# Patient Record
Sex: MALE | Race: WHITE | Employment: OTHER | URBAN - METROPOLITAN AREA
[De-identification: names, ages, dates, MRNs, and addresses within clinical notes are randomized per-mention and may not be internally consistent; named-entity substitution may affect disease eponyms.]

---

## 2017-01-06 NOTE — TELEPHONE ENCOUNTER
Please advise on refill request.   Last refilled on 5/10/16    Refill Protocol Appointment Criteria  · Appointment scheduled in the past 6 months or in the next 3 months  Recent Visits       Provider Department Primary Dx    6 months ago Yrn Hernandez

## 2017-01-09 RX ORDER — ACETAMINOPHEN AND CODEINE PHOSPHATE 300; 30 MG/1; MG/1
1 TABLET ORAL EVERY 4 HOURS PRN
Qty: 60 TABLET | Refills: 0 | OUTPATIENT
Start: 2017-01-09 | End: 2018-06-25

## 2017-01-09 RX ORDER — WARFARIN SODIUM 5 MG/1
5 TABLET ORAL DAILY
Qty: 135 TABLET | Refills: 0 | Status: SHIPPED | OUTPATIENT
Start: 2017-01-09 | End: 2017-01-20

## 2017-01-09 NOTE — TELEPHONE ENCOUNTER
Last issued on 8/10/16, please advise  Recent Visits       Provider Department Primary Dx    6 months ago Herbert Hill MD [facility] Acute bronchitis, unspecified organism    8 months ago MD Trae Walters

## 2017-01-09 NOTE — TELEPHONE ENCOUNTER
Approve refill for either 30 or 90 days depending on patient's insurance plan. Needs office visit before further refills.

## 2017-01-10 ENCOUNTER — TELEPHONE (OUTPATIENT)
Dept: INTERNAL MEDICINE CLINIC | Facility: CLINIC | Age: 62
End: 2017-01-10

## 2017-01-10 ENCOUNTER — ANTI-COAG VISIT (OUTPATIENT)
Dept: INTERNAL MEDICINE CLINIC | Facility: CLINIC | Age: 62
End: 2017-01-10

## 2017-01-10 DIAGNOSIS — I48.20 CHRONIC ATRIAL FIBRILLATION (HCC): Primary | ICD-10-CM

## 2017-01-10 LAB — INR: 2.5 (ref 2–3)

## 2017-01-10 PROCEDURE — 36416 COLLJ CAPILLARY BLOOD SPEC: CPT

## 2017-01-10 PROCEDURE — 85610 PROTHROMBIN TIME: CPT

## 2017-01-10 NOTE — TELEPHONE ENCOUNTER
Tylenol #3 w/ [place]--Please see 01/04 encounter. Pt stated he no longer uses [facility]. Pt would like rx phoned in to [facility].  Pt requesting rx asap.     Current outpatient prescriptions:   •  Acetaminophen-Codeine (TYLENOL WITH CODEINE #3) 300-

## 2017-01-20 ENCOUNTER — HOSPITAL ENCOUNTER (OUTPATIENT)
Dept: GENERAL RADIOLOGY | Facility: HOSPITAL | Age: 62
Discharge: HOME | End: 2017-01-20
Attending: INTERNAL MEDICINE
Payer: COMMERCIAL

## 2017-01-20 ENCOUNTER — OFFICE VISIT (OUTPATIENT)
Dept: INTERNAL MEDICINE CLINIC | Facility: CLINIC | Age: 62
End: 2017-01-20

## 2017-01-20 VITALS
SYSTOLIC BLOOD PRESSURE: 126 MMHG | BODY MASS INDEX: 37.3 KG/M2 | DIASTOLIC BLOOD PRESSURE: 87 MMHG | HEIGHT: 75 IN | WEIGHT: 300 LBS | TEMPERATURE: 98 F | HEART RATE: 86 BPM

## 2017-01-20 DIAGNOSIS — M25.562 LEFT MEDIAL KNEE PAIN: Primary | ICD-10-CM

## 2017-01-20 DIAGNOSIS — M25.562 LEFT MEDIAL KNEE PAIN: ICD-10-CM

## 2017-01-20 PROCEDURE — 99213 OFFICE O/P EST LOW 20 MIN: CPT | Performed by: INTERNAL MEDICINE

## 2017-01-20 PROCEDURE — 99212 OFFICE O/P EST SF 10 MIN: CPT | Performed by: INTERNAL MEDICINE

## 2017-01-20 PROCEDURE — 73560 X-RAY EXAM OF KNEE 1 OR 2: CPT

## 2017-01-20 NOTE — PROGRESS NOTES
C/o severe pain l medial knee x 1 week no t getting  Better  Tried tylenol 3 no rleidef  Has at fib, on warfarin  Blood pressure 126/87, pulse 86, temperature 97.6 °F (36.4 °C), temperature source Oral, height 6' 3\" (1.905 m), weight 300 lb (136.079 kg).

## 2017-01-23 ENCOUNTER — TELEPHONE (OUTPATIENT)
Dept: INTERNAL MEDICINE CLINIC | Facility: CLINIC | Age: 62
End: 2017-01-23

## 2017-01-23 NOTE — TELEPHONE ENCOUNTER
Pharmacy calling regarding Capsaicin 0.075 % External Cream.  Pharmacy state that manufacture no longer makes this but they do have Capsaicin 0.1 % External Cream, pharmacy would like to know if they can give this to pt. ..please advise

## 2017-01-24 RX ORDER — ASPIRIN 81 MG
TABLET,CHEWABLE ORAL
Qty: 56.6 G | Refills: 5 | Status: ON HOLD | OUTPATIENT
Start: 2017-01-24 | End: 2020-07-08

## 2017-02-08 ENCOUNTER — OFFICE VISIT (OUTPATIENT)
Dept: INTERNAL MEDICINE CLINIC | Facility: CLINIC | Age: 62
End: 2017-02-08

## 2017-02-08 VITALS
SYSTOLIC BLOOD PRESSURE: 136 MMHG | HEART RATE: 88 BPM | WEIGHT: 299 LBS | RESPIRATION RATE: 16 BRPM | BODY MASS INDEX: 36.79 KG/M2 | DIASTOLIC BLOOD PRESSURE: 88 MMHG | HEIGHT: 75.5 IN

## 2017-02-08 DIAGNOSIS — M25.562 ARTHRALGIA OF LEFT LOWER LEG: Primary | ICD-10-CM

## 2017-02-08 DIAGNOSIS — I10 ESSENTIAL HYPERTENSION: ICD-10-CM

## 2017-02-08 DIAGNOSIS — E66.01 MORBID OBESITY DUE TO EXCESS CALORIES (HCC): ICD-10-CM

## 2017-02-08 DIAGNOSIS — I48.20 CHRONIC ATRIAL FIBRILLATION (HCC): ICD-10-CM

## 2017-02-08 PROCEDURE — 99212 OFFICE O/P EST SF 10 MIN: CPT | Performed by: INTERNAL MEDICINE

## 2017-02-08 PROCEDURE — 99214 OFFICE O/P EST MOD 30 MIN: CPT | Performed by: INTERNAL MEDICINE

## 2017-02-08 RX ORDER — TRAMADOL HYDROCHLORIDE 50 MG/1
50 TABLET ORAL EVERY 6 HOURS PRN
Qty: 50 TABLET | Refills: 1 | Status: SHIPPED | OUTPATIENT
Start: 2017-02-08 | End: 2017-03-11

## 2017-02-08 NOTE — PROGRESS NOTES
Yudith Reddy is a 57year old male. HPI:   1. Arthralgia of left lower leg    Has been having increased pain in his left knee recently. Had x ray. Showed moderate arthritis on x ray.      2. Essential hypertension with goal <140/90    Patient has been fo STRENGTH) 81 MG Oral Tab EC Take 81 mg by mouth daily.  Disp:  Rfl:       Past Medical History   Diagnosis Date   • Atrial fibrillation [facility])    • Clot 2009     Right leg clot, coumadin   • Alcohol abuse 2003   • Musculoskeletal disorder      Per [employer]: M mg) up to 4 daily. (1 every 6 hours) for pain pain relief. Ultram 50 mg also ordered for use every 6 hours if pain severe. Glucosamine chondroitin 500/400 can be tried OTC (over the counter) twice daily as well.  [place] dream cream advised as well as cap

## 2017-02-14 ENCOUNTER — OFFICE VISIT (OUTPATIENT)
Dept: DERMATOLOGY CLINIC | Facility: CLINIC | Age: 62
End: 2017-02-14

## 2017-02-14 DIAGNOSIS — D23.60 BENIGN NEOPLASM OF SKIN OF UPPER LIMB, INCLUDING SHOULDER, UNSPECIFIED LATERALITY: ICD-10-CM

## 2017-02-14 DIAGNOSIS — L81.4 SOLAR LENTIGO: ICD-10-CM

## 2017-02-14 DIAGNOSIS — L57.8 SUN-DAMAGED SKIN: ICD-10-CM

## 2017-02-14 DIAGNOSIS — D23.5 BENIGN NEOPLASM OF SKIN OF TRUNK, EXCEPT SCROTUM: ICD-10-CM

## 2017-02-14 DIAGNOSIS — L30.9 DERMATITIS: ICD-10-CM

## 2017-02-14 DIAGNOSIS — D23.70 BENIGN NEOPLASM OF SKIN OF LOWER LIMB, INCLUDING HIP, UNSPECIFIED LATERALITY: ICD-10-CM

## 2017-02-14 DIAGNOSIS — L57.0 ACTINIC KERATOSIS: Primary | ICD-10-CM

## 2017-02-14 DIAGNOSIS — L82.1 SEBORRHEIC KERATOSES: ICD-10-CM

## 2017-02-14 DIAGNOSIS — D23.4 BENIGN NEOPLASM OF SCALP AND SKIN OF NECK: ICD-10-CM

## 2017-02-14 DIAGNOSIS — D23.30 BENIGN NEOPLASM OF SKIN OF FACE: ICD-10-CM

## 2017-02-14 PROCEDURE — 99202 OFFICE O/P NEW SF 15 MIN: CPT | Performed by: DERMATOLOGY

## 2017-02-14 PROCEDURE — 17000 DESTRUCT PREMALG LESION: CPT | Performed by: DERMATOLOGY

## 2017-02-14 PROCEDURE — 17003 DESTRUCT PREMALG LES 2-14: CPT | Performed by: DERMATOLOGY

## 2017-02-14 NOTE — PROGRESS NOTES
HPI:     Chief Complaint     Derm Problem; Rash        HPI     Derm Problem    Additional comments: LOV 6/19/2012. Patient presents for full body check. Patient with \"sun spots\" to jered arms. No personal or family hx of skin ca.            Rash    Addition (four) hours as needed for Pain.  Disp: 60 tablet Rfl: 0   Atorvastatin Calcium 10 MG Oral Tab TAKE 1 TABLET BY MOUTH EVERY NIGHT AT BEDTIME Disp: 90 tablet Rfl: 0   Warfarin Sodium (COUMADIN) 5 MG Oral Tab TAKE AS DIRECTED BY ANTICOAGULATION CLINIC: 1.5 ta Smoker  For 20.00 Years     Smokeless tobacco: Former User    Alcohol Use: No    Drug Use: No    Sexual Activity: Not on file   Not on file  Other Topics Concern    Caffeine Concern Yes    Comment: tea, coffee, 8 cups daily    Pt has a pacemaker No    Pt h keratoses-reassurance–no treatment  Solar lentigo  Benign neoplasm of skin of trunk, except scrotum-ABCDE's of melanoma discussed. the patient is to follow up yearly. The patient will come sooner should they note  anything new or changing.   Proper sunbloc

## 2017-03-01 NOTE — TELEPHONE ENCOUNTER
Pharmacy requests refill on Rx Warfin 5 MG. Pharmacy stts pt will be coming in 3/2 to pick.  Please advise           Current Outpatient Prescriptions:                                Warfarin Sodium (COUMADIN) 5 MG Oral Tab TAKE AS DIRECTED BY ANTICOAGULATIO

## 2017-03-04 RX ORDER — WARFARIN SODIUM 5 MG/1
TABLET ORAL
Qty: 135 TABLET | Refills: 1 | Status: SHIPPED | OUTPATIENT
Start: 2017-03-04 | End: 2017-05-13

## 2017-03-04 NOTE — TELEPHONE ENCOUNTER
Refill Protocol Appointment Criteria  · Appointment scheduled in the past 6 months or in the next 3 months  Recent Visits       Provider Department Primary Dx    3 weeks ago Shira Bonilla MD [facility], [facility], [address] Arthralgia of left lo

## 2017-03-11 NOTE — TELEPHONE ENCOUNTER
Please advise on refill request.     Recent Visits       Provider Department Primary Dx    1 month ago Ismael Young, Deepa Barrow MD [facility], [facility], [address] Arthralgia of left lower leg    1 month ago Darci Ocampo MD [facility]

## 2017-03-13 RX ORDER — TRAMADOL HYDROCHLORIDE 50 MG/1
TABLET ORAL
Qty: 50 TABLET | Refills: 1 | OUTPATIENT
Start: 2017-03-13 | End: 2017-03-23

## 2017-03-22 ENCOUNTER — MYAURORA ACCOUNT LINK (OUTPATIENT)
Dept: OTHER | Age: 62
End: 2017-03-22

## 2017-03-22 ENCOUNTER — PRIOR ORIGINAL RECORDS (OUTPATIENT)
Dept: OTHER | Age: 62
End: 2017-03-22

## 2017-03-27 RX ORDER — TRAMADOL HYDROCHLORIDE 50 MG/1
TABLET ORAL
Qty: 50 TABLET | Refills: 0 | Status: SHIPPED | OUTPATIENT
Start: 2017-03-27 | End: 2018-06-25 | Stop reason: ALTCHOICE

## 2017-03-28 NOTE — TELEPHONE ENCOUNTER
[place] was contacted and it was stated that the Rx for Tramadol was called in on the 25th with 1 refill.

## 2017-04-04 RX ORDER — CARVEDILOL 25 MG/1
TABLET ORAL
Qty: 44 TABLET | Refills: 0 | Status: SHIPPED | OUTPATIENT
Start: 2017-04-04 | End: 2017-05-13

## 2017-05-01 RX ORDER — ATORVASTATIN CALCIUM 10 MG/1
TABLET, FILM COATED ORAL
Qty: 30 TABLET | Refills: 0 | Status: SHIPPED | OUTPATIENT
Start: 2017-05-01 | End: 2017-05-13

## 2017-05-03 ENCOUNTER — ANTI-COAG VISIT (OUTPATIENT)
Dept: INTERNAL MEDICINE CLINIC | Facility: CLINIC | Age: 62
End: 2017-05-03

## 2017-05-03 DIAGNOSIS — I48.20 CHRONIC ATRIAL FIBRILLATION (HCC): Primary | ICD-10-CM

## 2017-05-03 PROCEDURE — 85610 PROTHROMBIN TIME: CPT

## 2017-05-03 PROCEDURE — 36416 COLLJ CAPILLARY BLOOD SPEC: CPT

## 2017-05-05 NOTE — TELEPHONE ENCOUNTER
Pt calling regarding refill request for the following medication. Pt needs 90 day prescription for all 3 sent to [facility]. Amelie Casiano please advise       Current Outpatient Prescriptions:  ATORVASTATIN 10 MG Oral Tab TAKE ONE TABLET BY MOUTH EVERY NIGHT AT BEDTIME D

## 2017-05-13 RX ORDER — ATORVASTATIN CALCIUM 10 MG/1
TABLET, FILM COATED ORAL
Qty: 90 TABLET | Refills: 0 | Status: SHIPPED | OUTPATIENT
Start: 2017-05-13 | End: 2017-07-01

## 2017-05-13 NOTE — TELEPHONE ENCOUNTER
Cholesterol Medications  Protocol Criteria:  · Appointment scheduled in the past 12 months or in the next 3 months  · ALT & LDL on file in the past 12 months  · ALT result < 80  · LDL result <130   Recent Visits       Provider Department Primary Dx    3 mo  01/28/2016   K 4.8 01/28/2016    01/28/2016   CO2 28 01/28/2016   GLOBULIN 3.8* 01/28/2016   AGRATIO 0.9* 01/28/2016   ANIONGAP 6 01/28/2016   OSMOCALC 294 01/28/2016     REFILL PROTOCOL FAILED. No CMP done in the past 12 months.  Please advi

## 2017-05-17 RX ORDER — WARFARIN SODIUM 5 MG/1
TABLET ORAL
Qty: 135 TABLET | Refills: 1 | Status: SHIPPED | OUTPATIENT
Start: 2017-05-17 | End: 2017-11-22

## 2017-05-17 RX ORDER — CARVEDILOL 25 MG/1
25 TABLET ORAL 2 TIMES DAILY WITH MEALS
Qty: 44 TABLET | Refills: 0 | Status: SHIPPED | OUTPATIENT
Start: 2017-05-17 | End: 2017-07-13

## 2017-05-19 RX ORDER — CARVEDILOL 25 MG/1
TABLET ORAL
Qty: 44 TABLET | OUTPATIENT
Start: 2017-05-19

## 2017-05-31 ENCOUNTER — ANTI-COAG VISIT (OUTPATIENT)
Dept: INTERNAL MEDICINE CLINIC | Facility: CLINIC | Age: 62
End: 2017-05-31

## 2017-05-31 DIAGNOSIS — I48.20 CHRONIC ATRIAL FIBRILLATION (HCC): Primary | ICD-10-CM

## 2017-05-31 PROCEDURE — 85610 PROTHROMBIN TIME: CPT

## 2017-05-31 PROCEDURE — 36416 COLLJ CAPILLARY BLOOD SPEC: CPT

## 2017-06-28 ENCOUNTER — ANTI-COAG VISIT (OUTPATIENT)
Dept: INTERNAL MEDICINE CLINIC | Facility: CLINIC | Age: 62
End: 2017-06-28

## 2017-06-28 DIAGNOSIS — I48.20 CHRONIC ATRIAL FIBRILLATION (HCC): ICD-10-CM

## 2017-06-28 PROCEDURE — 99211 OFF/OP EST MAY X REQ PHY/QHP: CPT

## 2017-06-28 PROCEDURE — 85610 PROTHROMBIN TIME: CPT

## 2017-06-28 PROCEDURE — 36416 COLLJ CAPILLARY BLOOD SPEC: CPT

## 2017-07-03 RX ORDER — ATORVASTATIN CALCIUM 10 MG/1
TABLET, FILM COATED ORAL
Qty: 90 TABLET | Refills: 1 | Status: SHIPPED | OUTPATIENT
Start: 2017-07-03 | End: 2018-01-10

## 2017-07-14 RX ORDER — CARVEDILOL 25 MG/1
TABLET ORAL
Qty: 44 TABLET | Refills: 1 | Status: SHIPPED | OUTPATIENT
Start: 2017-07-14 | End: 2017-07-31

## 2017-07-26 ENCOUNTER — ANTI-COAG VISIT (OUTPATIENT)
Dept: INTERNAL MEDICINE CLINIC | Facility: CLINIC | Age: 62
End: 2017-07-26

## 2017-07-26 DIAGNOSIS — I48.20 CHRONIC ATRIAL FIBRILLATION (HCC): ICD-10-CM

## 2017-07-26 LAB — INR: 3.7 (ref 2–3)

## 2017-07-26 PROCEDURE — 36416 COLLJ CAPILLARY BLOOD SPEC: CPT

## 2017-07-26 PROCEDURE — 99211 OFF/OP EST MAY X REQ PHY/QHP: CPT

## 2017-07-26 PROCEDURE — 85610 PROTHROMBIN TIME: CPT

## 2017-07-31 RX ORDER — CARVEDILOL 25 MG/1
TABLET ORAL
Qty: 88 TABLET | Refills: 1 | Status: SHIPPED | OUTPATIENT
Start: 2017-07-31 | End: 2017-10-09

## 2017-08-08 ENCOUNTER — ANTI-COAG VISIT (OUTPATIENT)
Dept: INTERNAL MEDICINE CLINIC | Facility: CLINIC | Age: 62
End: 2017-08-08

## 2017-08-08 DIAGNOSIS — I48.20 CHRONIC ATRIAL FIBRILLATION (HCC): ICD-10-CM

## 2017-08-08 LAB — INR: 3 (ref 2–3)

## 2017-08-08 PROCEDURE — 36416 COLLJ CAPILLARY BLOOD SPEC: CPT

## 2017-08-08 PROCEDURE — 85610 PROTHROMBIN TIME: CPT

## 2017-10-09 RX ORDER — CARVEDILOL 25 MG/1
TABLET ORAL
Qty: 176 TABLET | Refills: 1 | Status: SHIPPED | OUTPATIENT
Start: 2017-10-09 | End: 2018-06-25

## 2017-11-20 ENCOUNTER — ANTI-COAG VISIT (OUTPATIENT)
Dept: INTERNAL MEDICINE CLINIC | Facility: CLINIC | Age: 62
End: 2017-11-20

## 2017-11-20 ENCOUNTER — TELEPHONE (OUTPATIENT)
Dept: CARDIOLOGY CLINIC | Facility: CLINIC | Age: 62
End: 2017-11-20

## 2017-11-20 DIAGNOSIS — I48.20 CHRONIC ATRIAL FIBRILLATION (HCC): ICD-10-CM

## 2017-11-20 PROCEDURE — 36416 COLLJ CAPILLARY BLOOD SPEC: CPT

## 2017-11-20 PROCEDURE — 85610 PROTHROMBIN TIME: CPT

## 2017-11-22 RX ORDER — WARFARIN SODIUM 5 MG/1
TABLET ORAL
Qty: 135 TABLET | Refills: 3 | Status: SHIPPED | OUTPATIENT
Start: 2017-11-22 | End: 2019-03-25

## 2017-12-11 ENCOUNTER — TELEPHONE (OUTPATIENT)
Dept: CARDIOLOGY CLINIC | Facility: CLINIC | Age: 62
End: 2017-12-11

## 2017-12-11 DIAGNOSIS — I48.20 CHRONIC ATRIAL FIBRILLATION (HCC): Primary | ICD-10-CM

## 2017-12-11 NOTE — TELEPHONE ENCOUNTER
Hi Dr. Ramon Virgen,    Pt is due for annual anticoagulation clinic order, please review and sign the pended order.

## 2017-12-12 ENCOUNTER — ANTI-COAG VISIT (OUTPATIENT)
Dept: INTERNAL MEDICINE CLINIC | Facility: CLINIC | Age: 62
End: 2017-12-12

## 2017-12-12 DIAGNOSIS — I48.20 CHRONIC ATRIAL FIBRILLATION (HCC): ICD-10-CM

## 2017-12-27 ENCOUNTER — ANTI-COAG VISIT (OUTPATIENT)
Dept: INTERNAL MEDICINE CLINIC | Facility: CLINIC | Age: 62
End: 2017-12-27

## 2017-12-27 DIAGNOSIS — I48.20 CHRONIC ATRIAL FIBRILLATION (HCC): ICD-10-CM

## 2017-12-27 PROCEDURE — 36416 COLLJ CAPILLARY BLOOD SPEC: CPT

## 2017-12-27 PROCEDURE — 85610 PROTHROMBIN TIME: CPT

## 2018-01-09 NOTE — TELEPHONE ENCOUNTER
Coumadin is rat  poison and needs to be followed every month to be safely taken. Sorry.
Hi Dr. Mamadou Solis,    Pt is asking since his INR has been stable, can he come every 6 week, when his INR is within range? Please advise.
Left vm to pt, also documented in [facility].
Skin normal color for race, warm, dry and intact. No evidence of rash.

## 2018-01-10 RX ORDER — ATORVASTATIN CALCIUM 10 MG/1
TABLET, FILM COATED ORAL
Qty: 90 TABLET | Refills: 3 | Status: SHIPPED | OUTPATIENT
Start: 2018-01-10 | End: 2019-01-03

## 2018-01-24 ENCOUNTER — ANTI-COAG VISIT (OUTPATIENT)
Dept: INTERNAL MEDICINE CLINIC | Facility: CLINIC | Age: 63
End: 2018-01-24

## 2018-01-24 DIAGNOSIS — I48.20 CHRONIC ATRIAL FIBRILLATION (HCC): ICD-10-CM

## 2018-01-24 LAB — INR: 2.3 (ref 2–3)

## 2018-01-24 PROCEDURE — 36416 COLLJ CAPILLARY BLOOD SPEC: CPT

## 2018-01-24 PROCEDURE — 85610 PROTHROMBIN TIME: CPT

## 2018-02-01 ENCOUNTER — OFFICE VISIT (OUTPATIENT)
Dept: SURGERY | Facility: CLINIC | Age: 63
End: 2018-02-01

## 2018-02-01 VITALS
RESPIRATION RATE: 16 BRPM | HEIGHT: 74 IN | WEIGHT: 303.06 LBS | HEART RATE: 72 BPM | BODY MASS INDEX: 38.89 KG/M2 | DIASTOLIC BLOOD PRESSURE: 82 MMHG | SYSTOLIC BLOOD PRESSURE: 120 MMHG

## 2018-02-01 DIAGNOSIS — G47.33 OSA (OBSTRUCTIVE SLEEP APNEA): ICD-10-CM

## 2018-02-01 DIAGNOSIS — I42.0 DILATED CARDIOMYOPATHY (HCC): ICD-10-CM

## 2018-02-01 DIAGNOSIS — E78.2 MIXED HYPERLIPIDEMIA: ICD-10-CM

## 2018-02-01 DIAGNOSIS — I48.20 CHRONIC ATRIAL FIBRILLATION (HCC): ICD-10-CM

## 2018-02-01 DIAGNOSIS — E66.9 OBESITY (BMI 30-39.9): ICD-10-CM

## 2018-02-01 DIAGNOSIS — I10 ESSENTIAL HYPERTENSION: Primary | ICD-10-CM

## 2018-02-01 PROCEDURE — 99203 OFFICE O/P NEW LOW 30 MIN: CPT | Performed by: NURSE PRACTITIONER

## 2018-02-01 NOTE — PROGRESS NOTES
[facility] Consultation Note       Date of Consult:  2018    Patient:  Randi Ariza  :      3/22/1955  MRN:      [id number]    Referring Provider: Dr. Kris Gonzalez    Chief Complaint:  Patient presents with:  Consult: would like t 303 lb 0.8 oz   BMI 38.91 kg/m²      Patient Medications:      Current Outpatient Prescriptions:  ATORVASTATIN 10 MG Oral Tab TAKE 1 TABLET BY MOUTH  EVERY NIGHT AT BEDTIME Disp: 90 tablet Rfl: 3   WARFARIN SODIUM 5 MG Oral Tab TAKE AS DIRECTED BY  [facility] HERNIA SURGERY      Comment: 2005-hernia repair  No date: LIPOMA REMOVAL      Comment: Excision lipoma of back  No date: OTHER SURGICAL HISTORY      Comment: jered legs vein stripping  1993: OTHER SURGICAL HISTORY      Comment: Bilateral vein stripping  No d appetite change and fatigue. HENT: Negative. Respiratory: Negative. Negative for cough, shortness of breath and wheezing. Cardiovascular: Negative. Negative for chest pain and palpitations. Gastrointestinal: Negative.   Negative for abdominal di in hyper-somnolence. Encounter Diagnosis(ses):   Essential hypertension  (primary encounter diagnosis)  Mixed hyperlipidemia  MITCHEL (obstructive sleep apnea)  Chronic atrial fibrillation (HCC)  Dilated cardiomyopathy (HCC)  Obesity (BMI 30-39. 9)    PLAN Patient not to skip meals- be sure to eat breakfast- suggested Premier Protein Shake (one a day) for a meal replacement, if needed     Increase water intake to 48-64oz water/day      Eat 3-4 cups fruits/vegetables daily- substitute salty/sweet snacks with

## 2018-02-22 ENCOUNTER — TELEPHONE (OUTPATIENT)
Dept: INTERNAL MEDICINE CLINIC | Facility: CLINIC | Age: 63
End: 2018-02-22

## 2018-02-22 ENCOUNTER — ANTI-COAG VISIT (OUTPATIENT)
Dept: INTERNAL MEDICINE CLINIC | Facility: CLINIC | Age: 63
End: 2018-02-22

## 2018-02-22 DIAGNOSIS — I48.20 CHRONIC ATRIAL FIBRILLATION (HCC): ICD-10-CM

## 2018-02-22 LAB
INR: 2.6 (ref 2–3)
INR: 2.6 (ref 2–3)

## 2018-02-22 PROCEDURE — 36416 COLLJ CAPILLARY BLOOD SPEC: CPT

## 2018-02-22 PROCEDURE — 85610 PROTHROMBIN TIME: CPT

## 2018-03-05 ENCOUNTER — OFFICE VISIT (OUTPATIENT)
Dept: SURGERY | Facility: CLINIC | Age: 63
End: 2018-03-05

## 2018-03-05 VITALS
BODY MASS INDEX: 39.01 KG/M2 | SYSTOLIC BLOOD PRESSURE: 127 MMHG | OXYGEN SATURATION: 94 % | RESPIRATION RATE: 16 BRPM | HEART RATE: 71 BPM | DIASTOLIC BLOOD PRESSURE: 90 MMHG | WEIGHT: 304 LBS | HEIGHT: 74 IN

## 2018-03-05 DIAGNOSIS — E66.9 OBESITY (BMI 30-39.9): ICD-10-CM

## 2018-03-05 DIAGNOSIS — E78.2 MIXED HYPERLIPIDEMIA: ICD-10-CM

## 2018-03-05 DIAGNOSIS — I10 ESSENTIAL HYPERTENSION: Primary | ICD-10-CM

## 2018-03-05 DIAGNOSIS — I48.20 CHRONIC ATRIAL FIBRILLATION (HCC): ICD-10-CM

## 2018-03-05 DIAGNOSIS — R13.10 DYSPHAGIA, UNSPECIFIED TYPE: ICD-10-CM

## 2018-03-05 DIAGNOSIS — R60.0 LEG EDEMA: ICD-10-CM

## 2018-03-05 DIAGNOSIS — I42.0 DILATED CARDIOMYOPATHY (HCC): ICD-10-CM

## 2018-03-05 DIAGNOSIS — G47.33 OSA (OBSTRUCTIVE SLEEP APNEA): ICD-10-CM

## 2018-03-05 PROCEDURE — 99213 OFFICE O/P EST LOW 20 MIN: CPT | Performed by: NURSE PRACTITIONER

## 2018-03-05 NOTE — PROGRESS NOTES
[address], [address]  Dept: [phone]     Date:   3/5/2018    Patient:  Shaggy Yan  :      3/22/1955  MRN:      [id number]    Chief Complaint:  Pa MOUTH IN EVENING Disp: 135 tablet Rfl: 3   CARVEDILOL 25 MG Oral Tab TAKE 1 TABLET BY MOUTH TWO  TIMES DAILY WITH MEALS Disp: 176 tablet Rfl: 1   TRAMADOL HCL 50 MG Oral Tab TAKE ONE TABLET BY MOUTH EVERY 6 HOURS AS NEEDED FOR PAIN Disp: 50 tablet Rfl: 0 Problem Relation Age of Onset   • Other [Other] [OTHER] Father      brain aneurysm   • Eye Problems Other      retinal blastoma, niece       Food Journal  · Reviewed and Discussed:       · Patient has a Food Journal?: yes   · Patient is reading nutrition normal. No respiratory distress. Abdominal: Soft. He exhibits no distension. There is no tenderness. Musculoskeletal: Normal range of motion. He exhibits no edema. Neurological: He is alert and oriented to person, place, and time.    Skin: Skin is war of water per day. No fruit juices or regular soda. 3. Increase aerobic exercises (goal is 150 minutes per week)  4. Increase fruit and vegetable servings/day  5. Keep carbs at or below 100g/day  6. Avoid skipping meals  7.  Prepare meals and snacks in adva

## 2018-03-16 ENCOUNTER — OFFICE VISIT (OUTPATIENT)
Dept: INTERNAL MEDICINE CLINIC | Facility: CLINIC | Age: 63
End: 2018-03-16

## 2018-03-16 VITALS
SYSTOLIC BLOOD PRESSURE: 150 MMHG | DIASTOLIC BLOOD PRESSURE: 91 MMHG | RESPIRATION RATE: 16 BRPM | WEIGHT: 305 LBS | BODY MASS INDEX: 39.14 KG/M2 | HEIGHT: 74 IN | HEART RATE: 88 BPM

## 2018-03-16 DIAGNOSIS — E66.01 MORBID OBESITY DUE TO EXCESS CALORIES (HCC): ICD-10-CM

## 2018-03-16 DIAGNOSIS — M17.11 ARTHRITIS OF RIGHT KNEE: Primary | ICD-10-CM

## 2018-03-16 DIAGNOSIS — I10 ESSENTIAL HYPERTENSION WITH GOAL BLOOD PRESSURE LESS THAN 140/90: ICD-10-CM

## 2018-03-16 DIAGNOSIS — I48.20 CHRONIC ATRIAL FIBRILLATION (HCC): ICD-10-CM

## 2018-03-16 PROCEDURE — 99214 OFFICE O/P EST MOD 30 MIN: CPT | Performed by: INTERNAL MEDICINE

## 2018-03-16 PROCEDURE — 99212 OFFICE O/P EST SF 10 MIN: CPT | Performed by: INTERNAL MEDICINE

## 2018-03-16 NOTE — PROGRESS NOTES
Juan A Hoang is a 60year old male. HPI:   1. Arthralgia of right knee    Has been having increased pain in his left knee recently. Had x ray. Showed moderate arthritis on x ray. Hurts to walk regularly.  Trying to lose weight with bariatric program. 6 HOURS AS NEEDED FOR PAIN Disp: 50 tablet Rfl: 0   Acetaminophen-Codeine (TYLENOL WITH CODEINE #3) 300-30 MG Oral Tab Take 1 tablet by mouth every 4 (four) hours as needed for Pain.  Disp: 60 tablet Rfl: 0      Past Medical History:   Diagnosis Date   • Al lower leg. Knees swollen and enlarged. ASSESSMENT AND PLAN:   1. Arthritis of right knee    Ice joint area intermittently as tolerated for short periods of time to decrease any inflammation and give some pain relief.  Take tylenol XS (500 mg) up to 4 varun

## 2018-03-22 ENCOUNTER — ANTI-COAG VISIT (OUTPATIENT)
Dept: INTERNAL MEDICINE CLINIC | Facility: CLINIC | Age: 63
End: 2018-03-22

## 2018-03-22 DIAGNOSIS — I48.20 CHRONIC ATRIAL FIBRILLATION (HCC): ICD-10-CM

## 2018-03-22 LAB — INR: 2.1 (ref 2–3)

## 2018-03-22 PROCEDURE — 36416 COLLJ CAPILLARY BLOOD SPEC: CPT

## 2018-03-22 PROCEDURE — 85610 PROTHROMBIN TIME: CPT

## 2018-04-09 ENCOUNTER — OFFICE VISIT (OUTPATIENT)
Dept: SURGERY | Facility: CLINIC | Age: 63
End: 2018-04-09

## 2018-04-09 VITALS
SYSTOLIC BLOOD PRESSURE: 130 MMHG | HEIGHT: 74 IN | OXYGEN SATURATION: 97 % | RESPIRATION RATE: 18 BRPM | WEIGHT: 305 LBS | DIASTOLIC BLOOD PRESSURE: 88 MMHG | BODY MASS INDEX: 39.14 KG/M2 | HEART RATE: 84 BPM

## 2018-04-09 DIAGNOSIS — G47.33 OSA (OBSTRUCTIVE SLEEP APNEA): ICD-10-CM

## 2018-04-09 DIAGNOSIS — I42.0 DILATED CARDIOMYOPATHY (HCC): ICD-10-CM

## 2018-04-09 DIAGNOSIS — I10 ESSENTIAL HYPERTENSION WITH GOAL BLOOD PRESSURE LESS THAN 140/90: Primary | ICD-10-CM

## 2018-04-09 DIAGNOSIS — E78.2 MIXED HYPERLIPIDEMIA: ICD-10-CM

## 2018-04-09 DIAGNOSIS — I48.20 CHRONIC ATRIAL FIBRILLATION (HCC): ICD-10-CM

## 2018-04-09 DIAGNOSIS — E66.9 OBESITY (BMI 30-39.9): ICD-10-CM

## 2018-04-09 PROCEDURE — 99213 OFFICE O/P EST LOW 20 MIN: CPT | Performed by: NURSE PRACTITIONER

## 2018-04-09 NOTE — PROGRESS NOTES
[address] [place] [address]  [address]  [facility] [address] [facility] [address]  Dept: [phone]     Date:   18    Patient:  Steve Recio  :      3/22/1955  MRN:      [id number]    Chief Complaint:  Alla Rodríguez EVENING Disp: 135 tablet Rfl: 3   CARVEDILOL 25 MG Oral Tab TAKE 1 TABLET BY MOUTH TWO  TIMES DAILY WITH MEALS Disp: 176 tablet Rfl: 1   TRAMADOL HCL 50 MG Oral Tab TAKE ONE TABLET BY MOUTH EVERY 6 HOURS AS NEEDED FOR PAIN Disp: 50 tablet Rfl: 0   Sybil retinal blastoma, niece       Food Journal  · Reviewed and Discussed:       · Patient has a Food Journal?: no   · Patient is reading nutrition labels?   no  · Average Caloric Intake:   unknown  · Average CHO Intake: unknown  · Is patient exercising? no  · T tenderness. Musculoskeletal: Normal range of motion. He exhibits no edema. Neurological: He is alert and oriented to person, place, and time. Skin: Skin is warm and dry. Psychiatric: He has a normal mood and affect.  His behavior is normal. Judgment per week)  4. Increase fruit and vegetable servings/day  5. Keep carbs at or below 100g/day  6. Avoid skipping meals  7. Prepare meals and snacks in advance  8.  Limit ETOH intake- has been cutting down- drinks vodka when he drinks mixed with juice    Logan Pisano

## 2018-04-18 ENCOUNTER — ANTI-COAG VISIT (OUTPATIENT)
Dept: INTERNAL MEDICINE CLINIC | Facility: CLINIC | Age: 63
End: 2018-04-18

## 2018-04-18 DIAGNOSIS — I48.20 CHRONIC ATRIAL FIBRILLATION (HCC): ICD-10-CM

## 2018-04-18 PROCEDURE — 36416 COLLJ CAPILLARY BLOOD SPEC: CPT

## 2018-04-18 PROCEDURE — 85610 PROTHROMBIN TIME: CPT

## 2018-05-17 ENCOUNTER — OFFICE VISIT (OUTPATIENT)
Dept: INTERNAL MEDICINE CLINIC | Facility: CLINIC | Age: 63
End: 2018-05-17

## 2018-05-17 VITALS
BODY MASS INDEX: 39.27 KG/M2 | SYSTOLIC BLOOD PRESSURE: 139 MMHG | DIASTOLIC BLOOD PRESSURE: 100 MMHG | HEIGHT: 74 IN | WEIGHT: 306 LBS | HEART RATE: 97 BPM | RESPIRATION RATE: 16 BRPM

## 2018-05-17 DIAGNOSIS — I10 ESSENTIAL HYPERTENSION WITH GOAL BLOOD PRESSURE LESS THAN 140/90: Primary | ICD-10-CM

## 2018-05-17 DIAGNOSIS — G47.33 OSA (OBSTRUCTIVE SLEEP APNEA): ICD-10-CM

## 2018-05-17 DIAGNOSIS — E66.01 MORBID OBESITY DUE TO EXCESS CALORIES (HCC): ICD-10-CM

## 2018-05-17 DIAGNOSIS — I48.20 CHRONIC ATRIAL FIBRILLATION (HCC): ICD-10-CM

## 2018-05-17 PROCEDURE — 99212 OFFICE O/P EST SF 10 MIN: CPT | Performed by: INTERNAL MEDICINE

## 2018-05-17 PROCEDURE — 99214 OFFICE O/P EST MOD 30 MIN: CPT | Performed by: INTERNAL MEDICINE

## 2018-05-17 RX ORDER — LISINOPRIL AND HYDROCHLOROTHIAZIDE 25; 20 MG/1; MG/1
1 TABLET ORAL DAILY
Qty: 90 TABLET | Refills: 3 | Status: SHIPPED | OUTPATIENT
Start: 2018-05-17 | End: 2018-06-25

## 2018-05-17 NOTE — PROGRESS NOTES
Kendall Gamez is a 58year old male. HPI:     1. Essential hypertension with goal <140/90    Patient has been following low salt diet and has been taking anti-hypertensive prescriptions as prescribed.  Blood pressure has been checked and is under good con TAKE ONE TABLET BY MOUTH EVERY 6 HOURS AS NEEDED FOR PAIN Disp: 50 tablet Rfl: 0   Capsaicin 0.1 % External Cream Apply 1 Application topically 3 (three) times daily.  Disp: 56.6 g Rfl: 5   Acetaminophen-Codeine (TYLENOL WITH CODEINE #3) 300-30 MG Oral Tab to auscultation  CARDIO: RRR without murmur  GI: good BS's,no masses, HSM or tenderness  EXTREMITIES: no cyanosis, clubbing, Has healing scab on left ankle and lower leg. Knees swollen and enlarged. ASSESSMENT AND PLAN:     1.  Essential hypertension wit

## 2018-05-18 ENCOUNTER — ANTI-COAG VISIT (OUTPATIENT)
Dept: INTERNAL MEDICINE CLINIC | Facility: CLINIC | Age: 63
End: 2018-05-18

## 2018-05-18 DIAGNOSIS — I48.20 CHRONIC ATRIAL FIBRILLATION (HCC): ICD-10-CM

## 2018-05-18 PROCEDURE — 85610 PROTHROMBIN TIME: CPT

## 2018-05-18 PROCEDURE — 36416 COLLJ CAPILLARY BLOOD SPEC: CPT

## 2018-06-01 ENCOUNTER — OFFICE VISIT (OUTPATIENT)
Dept: SURGERY | Facility: CLINIC | Age: 63
End: 2018-06-01

## 2018-06-01 VITALS
WEIGHT: 298.06 LBS | BODY MASS INDEX: 38.25 KG/M2 | RESPIRATION RATE: 18 BRPM | HEIGHT: 74 IN | SYSTOLIC BLOOD PRESSURE: 160 MMHG | DIASTOLIC BLOOD PRESSURE: 90 MMHG

## 2018-06-01 DIAGNOSIS — I42.0 DILATED CARDIOMYOPATHY (HCC): ICD-10-CM

## 2018-06-01 DIAGNOSIS — I48.20 CHRONIC ATRIAL FIBRILLATION (HCC): ICD-10-CM

## 2018-06-01 DIAGNOSIS — E78.2 MIXED HYPERLIPIDEMIA: ICD-10-CM

## 2018-06-01 DIAGNOSIS — G47.33 OSA (OBSTRUCTIVE SLEEP APNEA): ICD-10-CM

## 2018-06-01 DIAGNOSIS — I10 ESSENTIAL HYPERTENSION WITH GOAL BLOOD PRESSURE LESS THAN 140/90: Primary | ICD-10-CM

## 2018-06-01 PROCEDURE — 99213 OFFICE O/P EST LOW 20 MIN: CPT | Performed by: NURSE PRACTITIONER

## 2018-06-01 NOTE — PROGRESS NOTES
[address], [facility]  [address] [facility] [address] [facility] [address]  Dept: [phone]     Date:   18    Patient:  Juan A Hoang  :      3/22/1955  MRN:      [id number]    Chief Complaint:  Doy [place] Disp: 135 tablet Rfl: 3   CARVEDILOL 25 MG Oral Tab TAKE 1 TABLET BY MOUTH TWO  TIMES DAILY WITH MEALS Disp: 176 tablet Rfl: 1   TRAMADOL HCL 50 MG Oral Tab TAKE ONE TABLET BY MOUTH EVERY 6 HOURS AS NEEDED FOR PAIN Disp: 50 tablet Rfl: 0   Capsaicin 0.1 % blastoma, niece       Food Journal  · Reviewed and Discussed:       · Patient has a Food Journal?: no   · Patient is reading nutrition labels?   no  · Average Caloric Intake:   unknown  · Average CHO Intake: unknown  · Is patient exercising? no  · Type of e distension. There is no tenderness. Musculoskeletal: Normal range of motion. He exhibits no edema. Neurological: He is alert and oriented to person, place, and time. Skin: Skin is warm and dry. Psychiatric: He has a normal mood and affect.  His beha minutes per week)  4. Increase fruit and vegetable servings/day  5. Keep carbs at or below 100g/day  6. Avoid skipping meals  7. Prepare meals and snacks in advance  8.  Limit ETOH intake- has been cutting down- drinks vodka when he drinks mixed with juice

## 2018-06-14 ENCOUNTER — ANTI-COAG VISIT (OUTPATIENT)
Dept: INTERNAL MEDICINE CLINIC | Facility: CLINIC | Age: 63
End: 2018-06-14

## 2018-06-14 DIAGNOSIS — I48.20 CHRONIC ATRIAL FIBRILLATION (HCC): ICD-10-CM

## 2018-06-14 PROCEDURE — 36416 COLLJ CAPILLARY BLOOD SPEC: CPT

## 2018-06-14 PROCEDURE — 85610 PROTHROMBIN TIME: CPT

## 2018-06-14 PROCEDURE — 99211 OFF/OP EST MAY X REQ PHY/QHP: CPT

## 2018-06-25 ENCOUNTER — OFFICE VISIT (OUTPATIENT)
Dept: INTERNAL MEDICINE CLINIC | Facility: CLINIC | Age: 63
End: 2018-06-25

## 2018-06-25 VITALS
HEART RATE: 109 BPM | BODY MASS INDEX: 38.24 KG/M2 | TEMPERATURE: 98 F | HEIGHT: 74 IN | WEIGHT: 298 LBS | SYSTOLIC BLOOD PRESSURE: 137 MMHG | DIASTOLIC BLOOD PRESSURE: 89 MMHG

## 2018-06-25 DIAGNOSIS — E66.01 MORBID OBESITY DUE TO EXCESS CALORIES (HCC): ICD-10-CM

## 2018-06-25 DIAGNOSIS — I48.20 CHRONIC ATRIAL FIBRILLATION (HCC): Primary | ICD-10-CM

## 2018-06-25 DIAGNOSIS — I10 ESSENTIAL HYPERTENSION WITH GOAL BLOOD PRESSURE LESS THAN 140/90: ICD-10-CM

## 2018-06-25 DIAGNOSIS — G47.33 OSA (OBSTRUCTIVE SLEEP APNEA): ICD-10-CM

## 2018-06-25 PROCEDURE — 99212 OFFICE O/P EST SF 10 MIN: CPT | Performed by: INTERNAL MEDICINE

## 2018-06-25 PROCEDURE — 99214 OFFICE O/P EST MOD 30 MIN: CPT | Performed by: INTERNAL MEDICINE

## 2018-06-25 RX ORDER — CARVEDILOL 25 MG/1
TABLET ORAL
Qty: 172 TABLET | Refills: 3 | Status: SHIPPED | OUTPATIENT
Start: 2018-06-25 | End: 2019-10-19

## 2018-06-25 RX ORDER — ACETAMINOPHEN AND CODEINE PHOSPHATE 300; 30 MG/1; MG/1
1 TABLET ORAL EVERY 4 HOURS PRN
Qty: 60 TABLET | Refills: 0 | Status: SHIPPED | OUTPATIENT
Start: 2018-06-25 | End: 2018-10-01

## 2018-06-25 NOTE — PROGRESS NOTES
Elma Lyle is a 58year old male. HPI:     1. Essential hypertension with goal <140/90    Patient has been following low salt diet and has been taking anti-hypertensive prescriptions as prescribed.  Blood pressure has been checked and is under good con aspirin (ECOTRIN LOW STRENGTH) 81 MG Oral Tab EC Take 81 mg by mouth daily. Disp:  Rfl:    Capsaicin 0.1 % External Cream Apply 1 Application topically 3 (three) times daily.  Disp: 56.6 g Rfl: 5   Acetaminophen-Codeine (TYLENOL WITH CODEINE #3) 300-30 MG murmur  GI: good BS's,no masses, HSM or tenderness  EXTREMITIES: no cyanosis, clubbing, Has healing scab on left ankle and lower leg. Knees swollen and enlarged. ASSESSMENT AND PLAN:     1.  Essential hypertension with goal blood pressure less than 140/9

## 2018-06-28 ENCOUNTER — ANTI-COAG VISIT (OUTPATIENT)
Dept: INTERNAL MEDICINE CLINIC | Facility: CLINIC | Age: 63
End: 2018-06-28

## 2018-06-28 DIAGNOSIS — I48.20 CHRONIC ATRIAL FIBRILLATION (HCC): ICD-10-CM

## 2018-06-28 PROCEDURE — 36416 COLLJ CAPILLARY BLOOD SPEC: CPT

## 2018-06-28 PROCEDURE — 85610 PROTHROMBIN TIME: CPT

## 2018-07-13 ENCOUNTER — ANTI-COAG VISIT (OUTPATIENT)
Dept: INTERNAL MEDICINE CLINIC | Facility: CLINIC | Age: 63
End: 2018-07-13

## 2018-07-13 DIAGNOSIS — I48.20 CHRONIC ATRIAL FIBRILLATION (HCC): ICD-10-CM

## 2018-07-13 LAB — INR: 1.9 (ref 2–3)

## 2018-07-13 PROCEDURE — 99211 OFF/OP EST MAY X REQ PHY/QHP: CPT | Performed by: INTERNAL MEDICINE

## 2018-07-30 ENCOUNTER — OFFICE VISIT (OUTPATIENT)
Dept: INTERNAL MEDICINE CLINIC | Facility: CLINIC | Age: 63
End: 2018-07-30
Payer: COMMERCIAL

## 2018-07-30 VITALS
RESPIRATION RATE: 16 BRPM | WEIGHT: 303 LBS | SYSTOLIC BLOOD PRESSURE: 130 MMHG | HEIGHT: 75 IN | DIASTOLIC BLOOD PRESSURE: 101 MMHG | HEART RATE: 94 BPM | BODY MASS INDEX: 37.67 KG/M2

## 2018-07-30 DIAGNOSIS — I48.20 CHRONIC ATRIAL FIBRILLATION (HCC): ICD-10-CM

## 2018-07-30 DIAGNOSIS — G47.33 OSA (OBSTRUCTIVE SLEEP APNEA): ICD-10-CM

## 2018-07-30 DIAGNOSIS — E66.01 MORBID OBESITY DUE TO EXCESS CALORIES (HCC): ICD-10-CM

## 2018-07-30 DIAGNOSIS — I10 ESSENTIAL HYPERTENSION WITH GOAL BLOOD PRESSURE LESS THAN 140/90: Primary | ICD-10-CM

## 2018-07-30 PROCEDURE — 99214 OFFICE O/P EST MOD 30 MIN: CPT | Performed by: INTERNAL MEDICINE

## 2018-07-30 PROCEDURE — 99212 OFFICE O/P EST SF 10 MIN: CPT | Performed by: INTERNAL MEDICINE

## 2018-07-30 RX ORDER — AMLODIPINE BESYLATE 5 MG/1
5 TABLET ORAL DAILY
Qty: 90 TABLET | Refills: 3 | Status: SHIPPED | OUTPATIENT
Start: 2018-07-30 | End: 2018-10-01 | Stop reason: ALTCHOICE

## 2018-07-30 RX ORDER — LISINOPRIL 20 MG/1
20 TABLET ORAL DAILY
Qty: 90 TABLET | Refills: 3 | Status: SHIPPED | OUTPATIENT
Start: 2018-07-30 | End: 2018-07-30

## 2018-07-30 NOTE — PROGRESS NOTES
Teo Cantrell is a 58year old male. HPI:     1. Essential hypertension with goal <140/90    Patient has been following low salt diet and has been taking anti-hypertensive prescriptions as prescribed.  Blood pressure has been checked and is under good con Rfl: 3   WARFARIN SODIUM 5 MG Oral Tab TAKE AS DIRECTED BY  ANTICOAGULATION CLINIC: 1.5 TABLETS AS DIRECTED BY  MOUTH IN EVENING Disp: 135 tablet Rfl: 3   Capsaicin 0.1 % External Cream Apply 1 Application topically 3 (three) times daily.  Disp: 56.6 g Rfl: BS's,no masses, HSM or tenderness  EXTREMITIES: no cyanosis, clubbing, Has healing scab on left ankle and lower leg. Knees swollen and enlarged. ASSESSMENT AND PLAN:     1.  Essential hypertension with goal blood pressure less than 140/90    Patient inst

## 2018-08-17 ENCOUNTER — ANTI-COAG VISIT (OUTPATIENT)
Dept: INTERNAL MEDICINE CLINIC | Facility: CLINIC | Age: 63
End: 2018-08-17
Payer: COMMERCIAL

## 2018-08-17 DIAGNOSIS — I48.20 CHRONIC ATRIAL FIBRILLATION (HCC): ICD-10-CM

## 2018-08-17 LAB — INR: 1.5 (ref 2–3)

## 2018-08-17 PROCEDURE — 36416 COLLJ CAPILLARY BLOOD SPEC: CPT

## 2018-08-17 PROCEDURE — 85610 PROTHROMBIN TIME: CPT

## 2018-08-20 ENCOUNTER — OFFICE VISIT (OUTPATIENT)
Dept: SURGERY | Facility: CLINIC | Age: 63
End: 2018-08-20
Payer: COMMERCIAL

## 2018-08-20 VITALS
SYSTOLIC BLOOD PRESSURE: 122 MMHG | DIASTOLIC BLOOD PRESSURE: 80 MMHG | OXYGEN SATURATION: 96 % | RESPIRATION RATE: 18 BRPM | BODY MASS INDEX: 39.47 KG/M2 | WEIGHT: 307.56 LBS | HEIGHT: 74 IN | HEART RATE: 79 BPM

## 2018-08-20 DIAGNOSIS — I10 ESSENTIAL HYPERTENSION WITH GOAL BLOOD PRESSURE LESS THAN 140/90: ICD-10-CM

## 2018-08-20 DIAGNOSIS — I48.20 CHRONIC ATRIAL FIBRILLATION (HCC): ICD-10-CM

## 2018-08-20 DIAGNOSIS — Z51.81 ENCOUNTER FOR THERAPEUTIC DRUG MONITORING: Primary | ICD-10-CM

## 2018-08-20 DIAGNOSIS — E78.2 MIXED HYPERLIPIDEMIA: ICD-10-CM

## 2018-08-20 DIAGNOSIS — R63.5 WEIGHT GAIN: ICD-10-CM

## 2018-08-20 DIAGNOSIS — I42.0 DILATED CARDIOMYOPATHY (HCC): ICD-10-CM

## 2018-08-20 DIAGNOSIS — R63.2 INCREASED APPETITE: ICD-10-CM

## 2018-08-20 DIAGNOSIS — E66.9 OBESITY (BMI 30-39.9): ICD-10-CM

## 2018-08-20 DIAGNOSIS — G47.33 OSA (OBSTRUCTIVE SLEEP APNEA): ICD-10-CM

## 2018-08-20 PROCEDURE — 99214 OFFICE O/P EST MOD 30 MIN: CPT | Performed by: NURSE PRACTITIONER

## 2018-08-20 NOTE — PROGRESS NOTES
[address], [address]  Dept: [phone]     Date:   18    Patient:  Nae Rea  :      3/22/1955  MRN:      [id number]    Chief Complaint:  Azell Alberta Disp: 172 tablet Rfl: 3   Acetaminophen-Codeine (TYLENOL WITH CODEINE #3) 300-30 MG Oral Tab Take 1 tablet by mouth every 4 (four) hours as needed for Pain.  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   no  · Average Caloric Intake:   unknown  · Average CHO Intake Effort normal. No respiratory distress. Abdominal: Soft. He exhibits no distension. There is no tenderness. Musculoskeletal: Normal range of motion. He exhibits no edema. Neurological: He is alert and oriented to person, place, and time.    Skin: Skin Fibrillation: on coumadin     Cardiomyopathy: saw Dr. Rashad Gill recently; had Echo done in [facility]    Increased appetite/weight gain: Admits to eating out often and drinking ETOH a few times per week related to out of town visitors and vacations;  Discussed healt

## 2018-08-22 ENCOUNTER — TELEPHONE (OUTPATIENT)
Dept: SURGERY | Facility: CLINIC | Age: 63
End: 2018-08-22

## 2018-08-22 NOTE — TELEPHONE ENCOUNTER
Please call Abdon Guillen and let him know Belviq was denied. He can use the coupon provided to him during his visit to submit to help with the cost of the med.   Thanks

## 2018-08-22 NOTE — TELEPHONE ENCOUNTER
[facility] sent a denial notice for patients Bleviq. I will fax it to the [place] location but just wanted you to you know.   Thank you  Eran Multani

## 2018-08-31 ENCOUNTER — ANTI-COAG VISIT (OUTPATIENT)
Dept: INTERNAL MEDICINE CLINIC | Facility: CLINIC | Age: 63
End: 2018-08-31
Payer: COMMERCIAL

## 2018-08-31 DIAGNOSIS — I48.20 CHRONIC ATRIAL FIBRILLATION (HCC): ICD-10-CM

## 2018-08-31 LAB — INR: 1.7 (ref 2–3)

## 2018-08-31 PROCEDURE — 85610 PROTHROMBIN TIME: CPT

## 2018-08-31 PROCEDURE — 36416 COLLJ CAPILLARY BLOOD SPEC: CPT

## 2018-08-31 PROCEDURE — 99211 OFF/OP EST MAY X REQ PHY/QHP: CPT

## 2018-09-20 ENCOUNTER — TELEPHONE (OUTPATIENT)
Dept: INTERNAL MEDICINE CLINIC | Facility: CLINIC | Age: 63
End: 2018-09-20

## 2018-09-20 DIAGNOSIS — I48.20 CHRONIC ATRIAL FIBRILLATION (HCC): Primary | ICD-10-CM

## 2018-09-21 ENCOUNTER — TELEPHONE (OUTPATIENT)
Dept: INTERNAL MEDICINE CLINIC | Facility: CLINIC | Age: 63
End: 2018-09-21

## 2018-09-21 NOTE — TELEPHONE ENCOUNTER
Current Outpatient Medications: AmLODIPine Besylate 5 MG Oral Tab Take 1 tablet (5 mg total) by mouth daily.  Disp: 90 tablet Rfl: 3

## 2018-09-22 RX ORDER — AMLODIPINE BESYLATE 5 MG/1
5 TABLET ORAL DAILY
Qty: 90 TABLET | Refills: 3 | OUTPATIENT
Start: 2018-09-22 | End: 2019-09-22

## 2018-09-28 ENCOUNTER — ANTI-COAG VISIT (OUTPATIENT)
Dept: INTERNAL MEDICINE CLINIC | Facility: CLINIC | Age: 63
End: 2018-09-28
Payer: COMMERCIAL

## 2018-09-28 DIAGNOSIS — Z79.01 LONG TERM (CURRENT) USE OF ANTICOAGULANTS: ICD-10-CM

## 2018-09-28 DIAGNOSIS — Z51.81 ENCOUNTER FOR THERAPEUTIC DRUG MONITORING: Primary | ICD-10-CM

## 2018-09-28 DIAGNOSIS — I48.20 CHRONIC ATRIAL FIBRILLATION (HCC): ICD-10-CM

## 2018-09-28 LAB — INR: 1.7 (ref 2–3)

## 2018-09-28 PROCEDURE — 85610 PROTHROMBIN TIME: CPT

## 2018-09-28 PROCEDURE — 36416 COLLJ CAPILLARY BLOOD SPEC: CPT

## 2018-10-01 ENCOUNTER — OFFICE VISIT (OUTPATIENT)
Dept: INTERNAL MEDICINE CLINIC | Facility: CLINIC | Age: 63
End: 2018-10-01
Payer: COMMERCIAL

## 2018-10-01 ENCOUNTER — OFFICE VISIT (OUTPATIENT)
Dept: SURGERY | Facility: CLINIC | Age: 63
End: 2018-10-01
Payer: COMMERCIAL

## 2018-10-01 VITALS
SYSTOLIC BLOOD PRESSURE: 139 MMHG | DIASTOLIC BLOOD PRESSURE: 98 MMHG | HEIGHT: 75 IN | RESPIRATION RATE: 16 BRPM | BODY MASS INDEX: 38.79 KG/M2 | HEART RATE: 70 BPM | WEIGHT: 312 LBS

## 2018-10-01 VITALS
HEART RATE: 104 BPM | DIASTOLIC BLOOD PRESSURE: 87 MMHG | OXYGEN SATURATION: 96 % | SYSTOLIC BLOOD PRESSURE: 129 MMHG | WEIGHT: 311 LBS | BODY MASS INDEX: 39.91 KG/M2 | RESPIRATION RATE: 16 BRPM | HEIGHT: 74 IN

## 2018-10-01 DIAGNOSIS — Z12.5 SCREENING FOR PROSTATE CANCER: ICD-10-CM

## 2018-10-01 DIAGNOSIS — E78.2 MIXED HYPERLIPIDEMIA: ICD-10-CM

## 2018-10-01 DIAGNOSIS — G47.33 OSA (OBSTRUCTIVE SLEEP APNEA): ICD-10-CM

## 2018-10-01 DIAGNOSIS — R63.5 WEIGHT GAIN: ICD-10-CM

## 2018-10-01 DIAGNOSIS — I10 ESSENTIAL HYPERTENSION WITH GOAL BLOOD PRESSURE LESS THAN 140/90: ICD-10-CM

## 2018-10-01 DIAGNOSIS — R63.2 INCREASED APPETITE: ICD-10-CM

## 2018-10-01 DIAGNOSIS — E66.01 MORBID OBESITY DUE TO EXCESS CALORIES (HCC): ICD-10-CM

## 2018-10-01 DIAGNOSIS — I48.20 CHRONIC ATRIAL FIBRILLATION (HCC): ICD-10-CM

## 2018-10-01 DIAGNOSIS — I10 ESSENTIAL HYPERTENSION WITH GOAL BLOOD PRESSURE LESS THAN 140/90: Primary | ICD-10-CM

## 2018-10-01 DIAGNOSIS — Z51.81 ENCOUNTER FOR THERAPEUTIC DRUG MONITORING: Primary | ICD-10-CM

## 2018-10-01 PROCEDURE — 99214 OFFICE O/P EST MOD 30 MIN: CPT | Performed by: NURSE PRACTITIONER

## 2018-10-01 PROCEDURE — 99214 OFFICE O/P EST MOD 30 MIN: CPT | Performed by: INTERNAL MEDICINE

## 2018-10-01 PROCEDURE — 99212 OFFICE O/P EST SF 10 MIN: CPT | Performed by: INTERNAL MEDICINE

## 2018-10-01 RX ORDER — ACETAMINOPHEN AND CODEINE PHOSPHATE 300; 30 MG/1; MG/1
1 TABLET ORAL EVERY 4 HOURS PRN
Qty: 42 TABLET | Refills: 0 | Status: SHIPPED | OUTPATIENT
Start: 2018-10-01 | End: 2019-01-22

## 2018-10-01 RX ORDER — TOPIRAMATE 25 MG/1
25 TABLET ORAL DAILY
Qty: 30 TABLET | Refills: 2 | Status: SHIPPED | OUTPATIENT
Start: 2018-10-01 | End: 2018-11-13

## 2018-10-01 RX ORDER — LISINOPRIL AND HYDROCHLOROTHIAZIDE 25; 20 MG/1; MG/1
1 TABLET ORAL DAILY
COMMUNITY
End: 2018-11-20

## 2018-10-01 NOTE — PATIENT INSTRUCTIONS
Goals for next month:  1. Keep a food log using [facility]  2. Drink 48-64 ounces of water per day. No fruit juices or regular soda. 3. Increase aerobic exercises (goal is 150 minutes per week)  4. Increase fruit and vegetable servings/day  5.  Keep carbs at or b

## 2018-10-01 NOTE — PROGRESS NOTES
Chante Kasper is a 58year old male. HPI:     1. Essential hypertension with goal <140/90    Patient has been following low salt diet and has been taking anti-hypertensive prescriptions as prescribed.  Blood pressure has been checked and is under good con WITH MEALS Disp: 172 tablet Rfl: 3   Acetaminophen-Codeine (TYLENOL WITH CODEINE #3) 300-30 MG Oral Tab Take 1 tablet by mouth every 4 (four) hours as needed for Pain.  Disp: 60 tablet Rfl: 0   ATORVASTATIN 10 MG Oral Tab TAKE 1 TABLET BY MOUTH  EVERY NIGHT kg)   BMI 39.00 kg/m²     GENERAL: well developed, well nourished,in no apparent distress  SKIN: no rashes,no suspicious lesions  HEENT: atraumatic, normocephalic,ears and throat are clear  NECK: supple,no adenopathy,no bruits  LUNGS: clear to auscultation considered. 5. Screening for prostate cancer    Check a PSA level. - PSA SCREEN; Future    The patient indicates understanding of these issues and agrees to the plan.     The patient is asked to return in 1 month

## 2018-10-01 NOTE — PROGRESS NOTES
[address], [address]  Dept: [phone]     Date:   10/1/18    Patient:  Nae Rea  :      3/22/1955  MRN:      [id number]    Chief Complaint:  Azell Alberta mouth every 4 (four) hours as needed for Pain.  Disp: 60 tablet Rfl: 0   ATORVASTATIN 10 MG Oral Tab TAKE 1 TABLET BY MOUTH  EVERY NIGHT AT BEDTIME Disp: 90 tablet Rfl: 3   WARFARIN SODIUM 5 MG Oral Tab TAKE AS DIRECTED BY  ANTICOAGULATION CLINIC: 1.5 TABLE History of tanning: Not Asked        Outdoor occupation: Not Asked        Pt has a pacemaker: No        Pt has a defibrillator: No        Reaction to local anesthetic: No    Social History Narrative      Not on file    Surgical History:  Past Surgical Hist Read nutrition labels, Drink 64 oz of water per day, Maintain a daily food journal, No drinking 30 minutes before or after meals, Utlize portion control strategies to reduce calorie intake, Identify triggers for eating and manage cues and Eat slowly and ta Encounter for therapeutic drug monitoring  (primary encounter diagnosis)  Weight gain  Chronic atrial fibrillation (hcc)  Essential hypertension with goal blood pressure less than 140/90  Mixed hyperlipidemia  Jesus (obstructive sleep apnea)  Increased zaheer pizza, bread, pasta, potatoes, rice    Per Dr. Sabrina Lira- Needs to get BP down to under 140/90 regularly before any BP raising pills can be considered. Since he has a hx of a-fib and cardiomyopathy, stimulants will not be used due to contraindication.

## 2018-10-19 ENCOUNTER — ANTI-COAG VISIT (OUTPATIENT)
Dept: INTERNAL MEDICINE CLINIC | Facility: CLINIC | Age: 63
End: 2018-10-19
Payer: COMMERCIAL

## 2018-10-19 DIAGNOSIS — I48.20 CHRONIC ATRIAL FIBRILLATION (HCC): ICD-10-CM

## 2018-10-19 PROCEDURE — 36416 COLLJ CAPILLARY BLOOD SPEC: CPT

## 2018-10-19 PROCEDURE — 85610 PROTHROMBIN TIME: CPT

## 2018-10-31 ENCOUNTER — LAB ENCOUNTER (OUTPATIENT)
Dept: LAB | Facility: HOSPITAL | Age: 63
End: 2018-10-31
Attending: INTERNAL MEDICINE
Payer: COMMERCIAL

## 2018-10-31 DIAGNOSIS — Z12.5 SCREENING FOR PROSTATE CANCER: ICD-10-CM

## 2018-10-31 DIAGNOSIS — I10 ESSENTIAL HYPERTENSION WITH GOAL BLOOD PRESSURE LESS THAN 140/90: ICD-10-CM

## 2018-10-31 PROCEDURE — 85025 COMPLETE CBC W/AUTO DIFF WBC: CPT

## 2018-10-31 PROCEDURE — 80061 LIPID PANEL: CPT

## 2018-10-31 PROCEDURE — 36415 COLL VENOUS BLD VENIPUNCTURE: CPT

## 2018-10-31 PROCEDURE — 84443 ASSAY THYROID STIM HORMONE: CPT

## 2018-10-31 PROCEDURE — 80053 COMPREHEN METABOLIC PANEL: CPT

## 2018-11-13 ENCOUNTER — OFFICE VISIT (OUTPATIENT)
Dept: SURGERY | Facility: CLINIC | Age: 63
End: 2018-11-13
Payer: COMMERCIAL

## 2018-11-13 VITALS
DIASTOLIC BLOOD PRESSURE: 99 MMHG | SYSTOLIC BLOOD PRESSURE: 131 MMHG | WEIGHT: 307 LBS | BODY MASS INDEX: 38.17 KG/M2 | HEART RATE: 111 BPM | HEIGHT: 75 IN | OXYGEN SATURATION: 18 % | RESPIRATION RATE: 18 BRPM

## 2018-11-13 DIAGNOSIS — R63.2 INCREASED APPETITE: ICD-10-CM

## 2018-11-13 DIAGNOSIS — R63.5 WEIGHT GAIN: ICD-10-CM

## 2018-11-13 DIAGNOSIS — Z51.81 ENCOUNTER FOR THERAPEUTIC DRUG MONITORING: ICD-10-CM

## 2018-11-13 DIAGNOSIS — E78.2 MIXED HYPERLIPIDEMIA: Primary | ICD-10-CM

## 2018-11-13 DIAGNOSIS — G47.33 OSA (OBSTRUCTIVE SLEEP APNEA): ICD-10-CM

## 2018-11-13 DIAGNOSIS — E66.9 OBESITY (BMI 30-39.9): ICD-10-CM

## 2018-11-13 PROCEDURE — 99214 OFFICE O/P EST MOD 30 MIN: CPT | Performed by: INTERNAL MEDICINE

## 2018-11-13 RX ORDER — TOPIRAMATE 25 MG/1
25 TABLET ORAL 2 TIMES DAILY
Qty: 180 TABLET | Refills: 2 | Status: SHIPPED | OUTPATIENT
Start: 2018-11-13 | End: 2019-01-02

## 2018-11-13 NOTE — PROGRESS NOTES
[address], [address]  Dept: [phone]     Date:   10/1/18    Patient:  Kya Piper  :      3/22/1955  MRN:      [id number]    Chief Complaint:  Ruthie Lambert TWO  TIMES DAILY WITH MEALS Disp: 172 tablet Rfl: 3   ATORVASTATIN 10 MG Oral Tab TAKE 1 TABLET BY MOUTH  EVERY NIGHT AT BEDTIME Disp: 90 tablet Rfl: 3   WARFARIN SODIUM 5 MG Oral Tab TAKE AS DIRECTED BY  ANTICOAGULATION CLINIC: 1.5 TABLETS AS DIRECTED BY Asked        Outdoor occupation: Not Asked        Pt has a pacemaker: No        Pt has a defibrillator: No        Reaction to local anesthetic: No    Social History Narrative      Not on file    Surgical History:    Past Surgical History:   Procedure Later breakfast, Eat 3 meals per day, Plan meals in advance, Read nutrition labels, Drink 64 oz of water per day, Maintain a daily food journal, No drinking 30 minutes before or after meals, Utlize portion control strategies to reduce calorie intake, Identify tr in hyper-somnolence. Encounter Diagnosis(ses):   Mixed hyperlipidemia  (primary encounter diagnosis)  Jesus (obstructive sleep apnea)  Obesity (bmi 30-39. 9)    PLAN   Patient is not interested in bariatric surgery.  Patient desires to pursue traditional w

## 2018-11-14 ENCOUNTER — ANTI-COAG VISIT (OUTPATIENT)
Dept: INTERNAL MEDICINE CLINIC | Facility: CLINIC | Age: 63
End: 2018-11-14
Payer: COMMERCIAL

## 2018-11-14 DIAGNOSIS — Z51.81 ENCOUNTER FOR THERAPEUTIC DRUG MONITORING: Primary | ICD-10-CM

## 2018-11-14 DIAGNOSIS — Z79.01 LONG TERM (CURRENT) USE OF ANTICOAGULANTS: ICD-10-CM

## 2018-11-14 DIAGNOSIS — I48.20 CHRONIC ATRIAL FIBRILLATION (HCC): ICD-10-CM

## 2018-11-14 PROCEDURE — 36416 COLLJ CAPILLARY BLOOD SPEC: CPT

## 2018-11-14 PROCEDURE — 85610 PROTHROMBIN TIME: CPT

## 2018-11-17 NOTE — TELEPHONE ENCOUNTER
Pharmacy   [facility] [address]ALIA [address] [phone], [phone]   Medication Detail    Disp Refills Start End    AmLODIPine Besylate 5 MG Oral Tab (Discontinued) 90 tablet 3 7/30/2018 10/1/2018    Sig - Route:  Take 1 tablet (5

## 2018-11-20 ENCOUNTER — TELEPHONE (OUTPATIENT)
Dept: INTERNAL MEDICINE CLINIC | Facility: CLINIC | Age: 63
End: 2018-11-20

## 2018-11-20 RX ORDER — AMLODIPINE BESYLATE 5 MG/1
5 TABLET ORAL DAILY
Qty: 90 TABLET | Refills: 0 | Status: SHIPPED | OUTPATIENT
Start: 2018-11-20 | End: 2019-01-08

## 2018-11-20 NOTE — TELEPHONE ENCOUNTER
Current Outpatient Medications: AmLODIPine Besylate 5 MG Oral Tab Take 1 tablet (5 mg total) by mouth daily for 90 doses.  Disp: 90 tablet Rfl: 0

## 2018-11-20 NOTE — TELEPHONE ENCOUNTER
Patient called back. Confirmed he is still taking Amlodipine. See 10/1/18 OV notes. Patient to f/u. GFR wnl, creatinine wnl. Patient would like his meds sent to [facility].  Not local.     Hypertensive Medications  Protocol Criteria:  · Appointment sc 2.9 10/31/2018    AGRATIO 0.9 (L) 01/28/2016    ANIONGAP 7 10/31/2018    OSMOCALC 298 (H) 10/31/2018

## 2018-12-12 ENCOUNTER — ANTI-COAG VISIT (OUTPATIENT)
Dept: INTERNAL MEDICINE CLINIC | Facility: CLINIC | Age: 63
End: 2018-12-12
Payer: COMMERCIAL

## 2018-12-12 DIAGNOSIS — I48.20 CHRONIC ATRIAL FIBRILLATION (HCC): ICD-10-CM

## 2018-12-12 DIAGNOSIS — Z79.01 LONG TERM (CURRENT) USE OF ANTICOAGULANTS: ICD-10-CM

## 2018-12-12 DIAGNOSIS — Z51.81 ENCOUNTER FOR THERAPEUTIC DRUG MONITORING: ICD-10-CM

## 2018-12-12 PROCEDURE — 36416 COLLJ CAPILLARY BLOOD SPEC: CPT

## 2018-12-12 PROCEDURE — 85610 PROTHROMBIN TIME: CPT

## 2019-01-02 ENCOUNTER — APPOINTMENT (OUTPATIENT)
Dept: LAB | Facility: HOSPITAL | Age: 64
End: 2019-01-02
Attending: INTERNAL MEDICINE
Payer: COMMERCIAL

## 2019-01-02 ENCOUNTER — OFFICE VISIT (OUTPATIENT)
Dept: SURGERY | Facility: CLINIC | Age: 64
End: 2019-01-02
Payer: COMMERCIAL

## 2019-01-02 VITALS
SYSTOLIC BLOOD PRESSURE: 128 MMHG | HEIGHT: 75 IN | OXYGEN SATURATION: 96 % | WEIGHT: 311.06 LBS | BODY MASS INDEX: 38.67 KG/M2 | RESPIRATION RATE: 18 BRPM | DIASTOLIC BLOOD PRESSURE: 88 MMHG | HEART RATE: 67 BPM

## 2019-01-02 DIAGNOSIS — I10 ESSENTIAL HYPERTENSION WITH GOAL BLOOD PRESSURE LESS THAN 140/90: ICD-10-CM

## 2019-01-02 DIAGNOSIS — R73.09 ABNORMAL BLOOD SUGAR: ICD-10-CM

## 2019-01-02 DIAGNOSIS — R63.2 INCREASED APPETITE: ICD-10-CM

## 2019-01-02 DIAGNOSIS — E66.9 OBESITY (BMI 30-39.9): ICD-10-CM

## 2019-01-02 DIAGNOSIS — E53.8 LOW VITAMIN B12 LEVEL: ICD-10-CM

## 2019-01-02 DIAGNOSIS — Z51.81 ENCOUNTER FOR THERAPEUTIC DRUG MONITORING: ICD-10-CM

## 2019-01-02 DIAGNOSIS — G47.33 OSA (OBSTRUCTIVE SLEEP APNEA): ICD-10-CM

## 2019-01-02 DIAGNOSIS — R63.5 WEIGHT GAIN: ICD-10-CM

## 2019-01-02 DIAGNOSIS — I10 ESSENTIAL HYPERTENSION WITH GOAL BLOOD PRESSURE LESS THAN 140/90: Primary | ICD-10-CM

## 2019-01-02 DIAGNOSIS — E55.9 VITAMIN D DEFICIENCY: ICD-10-CM

## 2019-01-02 DIAGNOSIS — E78.2 MIXED HYPERLIPIDEMIA: ICD-10-CM

## 2019-01-02 LAB
EST. AVERAGE GLUCOSE BLD GHB EST-MCNC: 120 MG/DL (ref 68–126)
HBA1C MFR BLD HPLC: 5.8 % (ref ?–5.7)
VIT B12 SERPL-MCNC: 154 PG/ML (ref 181–914)

## 2019-01-02 PROCEDURE — 82607 VITAMIN B-12: CPT

## 2019-01-02 PROCEDURE — 82397 CHEMILUMINESCENT ASSAY: CPT

## 2019-01-02 PROCEDURE — 83036 HEMOGLOBIN GLYCOSYLATED A1C: CPT

## 2019-01-02 PROCEDURE — 99214 OFFICE O/P EST MOD 30 MIN: CPT | Performed by: INTERNAL MEDICINE

## 2019-01-02 PROCEDURE — 36415 COLL VENOUS BLD VENIPUNCTURE: CPT

## 2019-01-02 PROCEDURE — 82306 VITAMIN D 25 HYDROXY: CPT

## 2019-01-02 RX ORDER — TOPIRAMATE 25 MG/1
25 TABLET ORAL 2 TIMES DAILY
Qty: 180 TABLET | Refills: 2 | Status: SHIPPED | OUTPATIENT
Start: 2019-01-02 | End: 2019-02-01

## 2019-01-02 NOTE — PROGRESS NOTES
[address] [place] [address],[address]  Dept: [phone]     Date:   10/1/18    Patient:  Melony Hightower  :      3/22/1955  MRN:      [id number]    Chief Complaint:  Julius Bird Tab TAKE 1 TABLET BY MOUTH TWO  TIMES DAILY WITH MEALS Disp: 172 tablet Rfl: 3   ATORVASTATIN 10 MG Oral Tab TAKE 1 TABLET BY MOUTH  EVERY NIGHT AT BEDTIME Disp: 90 tablet Rfl: 3   WARFARIN SODIUM 5 MG Oral Tab TAKE AS DIRECTED BY  ANTICOAGULATION CLINIC: History of tanning: Not Asked        Outdoor occupation: Not Asked        Pt has a pacemaker: No        Pt has a defibrillator: No        Reaction to local anesthetic: No    Social History Narrative      Not on file    Surgical History:    Past Surgical Reviewed and Discussed  Eat breakfast, Eat 3 meals per day, Plan meals in advance, Read nutrition labels, Drink 64 oz of water per day, Maintain a daily food journal, No drinking 30 minutes before or after meals, Utlize portion control strategies to reduce There has not been any increase in hyper-somnolence.      Encounter Diagnosis(ses):   Essential hypertension with goal blood pressure less than 140/90  (primary encounter diagnosis)  Mixed hyperlipidemia  Jesus (obstructive sleep apnea)  Increased appetite  O alcohol    Losing inches    Sam Plummer MD

## 2019-01-03 ENCOUNTER — TELEPHONE (OUTPATIENT)
Dept: SURGERY | Facility: CLINIC | Age: 64
End: 2019-01-03

## 2019-01-03 RX ORDER — ATORVASTATIN CALCIUM 10 MG/1
TABLET, FILM COATED ORAL
Qty: 90 TABLET | Refills: 3 | Status: SHIPPED | OUTPATIENT
Start: 2019-01-03 | End: 2019-12-07

## 2019-01-03 NOTE — TELEPHONE ENCOUNTER
Patient aware Dr. Sylvie Carpenter reviewed labs and his vitamin B-12 is low. Dr. Sylvie Carpenter would like for Taty Maher to start B-12 injections every two weeks for 6 weeks. Patient has an appt  with his primary doctor on the 8th of this month.  He will ask if they can adminis

## 2019-01-04 LAB — 25(OH)D3 SERPL-MCNC: 21.4 NG/ML (ref 30–100)

## 2019-01-05 LAB — LEPTIN: 27.8 NG/ML

## 2019-01-08 ENCOUNTER — ANTI-COAG VISIT (OUTPATIENT)
Dept: INTERNAL MEDICINE CLINIC | Facility: CLINIC | Age: 64
End: 2019-01-08
Payer: COMMERCIAL

## 2019-01-08 ENCOUNTER — OFFICE VISIT (OUTPATIENT)
Dept: INTERNAL MEDICINE CLINIC | Facility: CLINIC | Age: 64
End: 2019-01-08
Payer: COMMERCIAL

## 2019-01-08 VITALS
RESPIRATION RATE: 16 BRPM | DIASTOLIC BLOOD PRESSURE: 89 MMHG | SYSTOLIC BLOOD PRESSURE: 138 MMHG | BODY MASS INDEX: 38.17 KG/M2 | WEIGHT: 307 LBS | HEART RATE: 100 BPM | HEIGHT: 75 IN

## 2019-01-08 DIAGNOSIS — I48.20 CHRONIC ATRIAL FIBRILLATION (HCC): ICD-10-CM

## 2019-01-08 DIAGNOSIS — E53.8 VITAMIN B12 DEFICIENCY: ICD-10-CM

## 2019-01-08 DIAGNOSIS — Z79.01 LONG TERM (CURRENT) USE OF ANTICOAGULANTS: ICD-10-CM

## 2019-01-08 DIAGNOSIS — E66.01 MORBID OBESITY DUE TO EXCESS CALORIES (HCC): ICD-10-CM

## 2019-01-08 DIAGNOSIS — Z51.81 ENCOUNTER FOR THERAPEUTIC DRUG MONITORING: ICD-10-CM

## 2019-01-08 DIAGNOSIS — E55.9 VITAMIN D DEFICIENCY: ICD-10-CM

## 2019-01-08 DIAGNOSIS — I10 ESSENTIAL HYPERTENSION WITH GOAL BLOOD PRESSURE LESS THAN 140/90: Primary | ICD-10-CM

## 2019-01-08 DIAGNOSIS — M25.571 ACUTE RIGHT ANKLE PAIN: ICD-10-CM

## 2019-01-08 LAB — INR: 2.2 (ref 2–3)

## 2019-01-08 PROCEDURE — 96372 THER/PROPH/DIAG INJ SC/IM: CPT | Performed by: INTERNAL MEDICINE

## 2019-01-08 PROCEDURE — 36416 COLLJ CAPILLARY BLOOD SPEC: CPT

## 2019-01-08 PROCEDURE — 99214 OFFICE O/P EST MOD 30 MIN: CPT | Performed by: INTERNAL MEDICINE

## 2019-01-08 PROCEDURE — 85610 PROTHROMBIN TIME: CPT

## 2019-01-08 PROCEDURE — 99212 OFFICE O/P EST SF 10 MIN: CPT | Performed by: INTERNAL MEDICINE

## 2019-01-08 RX ORDER — AMLODIPINE BESYLATE 5 MG/1
TABLET ORAL
Qty: 90 TABLET | Refills: 0 | Status: SHIPPED | OUTPATIENT
Start: 2019-01-08 | End: 2019-03-25

## 2019-01-08 RX ORDER — ERGOCALCIFEROL 1.25 MG/1
50000 CAPSULE ORAL WEEKLY
Qty: 12 CAPSULE | Refills: 0 | Status: SHIPPED | OUTPATIENT
Start: 2019-01-08 | End: 2019-02-07

## 2019-01-08 RX ORDER — CYANOCOBALAMIN 1000 UG/ML
1000 INJECTION INTRAMUSCULAR; SUBCUTANEOUS WEEKLY
Status: SHIPPED | OUTPATIENT
Start: 2019-01-08 | End: 2020-03-03

## 2019-01-08 RX ADMIN — CYANOCOBALAMIN 1000 MCG: 1000 INJECTION INTRAMUSCULAR; SUBCUTANEOUS at 15:18:00

## 2019-01-08 NOTE — PROGRESS NOTES
Elma Lyle is a 58year old male. HPI:     1. Essential hypertension with goal <140/90    Patient has been following low salt diet and has been taking anti-hypertensive prescriptions as prescribed.  Blood pressure has been checked and is under good con 300-30 MG Oral Tab Take 1 tablet by mouth every 4 (four) hours as needed for Pain.  Disp: 42 tablet Rfl: 0   CARVEDILOL 25 MG Oral Tab TAKE 1 TABLET BY MOUTH TWO  TIMES DAILY WITH MEALS Disp: 172 tablet Rfl: 3   WARFARIN SODIUM 5 MG Oral Tab TAKE AS DIRECTE developed, well nourished,in no apparent distress  SKIN: no rashes,no suspicious lesions  HEENT: atraumatic, normocephalic,ears and throat are clear  NECK: supple,no adenopathy,no bruits  LUNGS: clear to auscultation  CARDIO: RRR without murmur  GI: good B counter. Repeat a vitamin D level in 4 months to check on replacement. Nurses please order 50,000 unit vitamin D2 from pharmacy and use instructions as given. Order vitamin D level in 4 months as well.     5. Vitamin B12 deficiency    Start B12 injections w

## 2019-01-15 ENCOUNTER — NURSE ONLY (OUTPATIENT)
Dept: INTERNAL MEDICINE CLINIC | Facility: CLINIC | Age: 64
End: 2019-01-15
Payer: COMMERCIAL

## 2019-01-15 DIAGNOSIS — E53.8 VITAMIN B 12 DEFICIENCY: Primary | ICD-10-CM

## 2019-01-15 PROCEDURE — 96372 THER/PROPH/DIAG INJ SC/IM: CPT | Performed by: INTERNAL MEDICINE

## 2019-01-15 RX ADMIN — CYANOCOBALAMIN 1000 MCG: 1000 INJECTION INTRAMUSCULAR; SUBCUTANEOUS at 09:25:00

## 2019-01-22 ENCOUNTER — NURSE ONLY (OUTPATIENT)
Dept: INTERNAL MEDICINE CLINIC | Facility: CLINIC | Age: 64
End: 2019-01-22
Payer: COMMERCIAL

## 2019-01-22 DIAGNOSIS — E53.8 VITAMIN B12 DEFICIENCY: Primary | ICD-10-CM

## 2019-01-22 PROCEDURE — 96372 THER/PROPH/DIAG INJ SC/IM: CPT | Performed by: INTERNAL MEDICINE

## 2019-01-22 RX ADMIN — CYANOCOBALAMIN 1000 MCG: 1000 INJECTION INTRAMUSCULAR; SUBCUTANEOUS at 09:44:00

## 2019-01-22 NOTE — TELEPHONE ENCOUNTER
Controlled medication pending for review. If approved needs to be called in or faxed by on-site staff.     Last Rx: 10-1-18  LOV: 1-8-19

## 2019-01-22 NOTE — TELEPHONE ENCOUNTER
•  Acetaminophen-Codeine (TYLENOL WITH CODEINE #3) 300-30 MG Oral Tab, Take 1 tablet by mouth every 4 (four) hours as needed for Pain., Disp: 42 tablet, Rfl: 0

## 2019-01-23 RX ORDER — ACETAMINOPHEN AND CODEINE PHOSPHATE 300; 30 MG/1; MG/1
1 TABLET ORAL EVERY 4 HOURS PRN
Qty: 42 TABLET | Refills: 0 | Status: SHIPPED
Start: 2019-01-23 | End: 2019-02-19

## 2019-01-25 ENCOUNTER — OFFICE VISIT (OUTPATIENT)
Dept: INTERNAL MEDICINE CLINIC | Facility: CLINIC | Age: 64
End: 2019-01-25
Payer: COMMERCIAL

## 2019-01-25 VITALS
WEIGHT: 301 LBS | SYSTOLIC BLOOD PRESSURE: 112 MMHG | DIASTOLIC BLOOD PRESSURE: 78 MMHG | RESPIRATION RATE: 16 BRPM | HEIGHT: 75 IN | HEART RATE: 67 BPM | BODY MASS INDEX: 37.42 KG/M2

## 2019-01-25 DIAGNOSIS — I48.20 CHRONIC ATRIAL FIBRILLATION (HCC): ICD-10-CM

## 2019-01-25 DIAGNOSIS — I10 ESSENTIAL HYPERTENSION WITH GOAL BLOOD PRESSURE LESS THAN 140/90: Primary | ICD-10-CM

## 2019-01-25 DIAGNOSIS — G47.33 OSA (OBSTRUCTIVE SLEEP APNEA): ICD-10-CM

## 2019-01-25 DIAGNOSIS — E66.01 MORBID OBESITY DUE TO EXCESS CALORIES (HCC): ICD-10-CM

## 2019-01-25 PROCEDURE — 99214 OFFICE O/P EST MOD 30 MIN: CPT | Performed by: INTERNAL MEDICINE

## 2019-01-25 PROCEDURE — 99212 OFFICE O/P EST SF 10 MIN: CPT | Performed by: INTERNAL MEDICINE

## 2019-01-25 NOTE — PROGRESS NOTES
Nae Rea is a 58year old male. HPI:     1. Essential hypertension with goal <140/90    Patient has been following low salt diet and has been taking anti-hypertensive prescriptions as prescribed.  Blood pressure has been checked and is under good con Oral Cap Take 1 capsule (50,000 Units total) by mouth once a week.  Disp: 12 capsule Rfl: 0   ATORVASTATIN 10 MG Oral Tab TAKE 1 TABLET BY MOUTH  EVERY NIGHT AT BEDTIME Disp: 90 tablet Rfl: 3   topiramate 25 MG Oral Tab Take 1 tablet (25 mg total) by mouth abdominal pain and denies heartburn  NEURO: denies headaches    EXAM:   /78   Pulse 67   Resp 16   Ht 6' 3\" (1.905 m)   Wt (!) 301 lb (136.5 kg)   BMI 37.62 kg/m²     GENERAL: well developed, well nourished,in no apparent distress  SKIN: no rashes,n (obstructive sleep apnea)    Continue to use CPAP as directed at stated parameters on a nitely basis. Call if machinery or equipment not working correctly or symptoms of snoring and fatigue increase despite regular use.  Diane      The patient indicates unde

## 2019-01-29 ENCOUNTER — TELEPHONE (OUTPATIENT)
Dept: INTERNAL MEDICINE CLINIC | Facility: CLINIC | Age: 64
End: 2019-01-29

## 2019-01-29 ENCOUNTER — NURSE ONLY (OUTPATIENT)
Dept: INTERNAL MEDICINE CLINIC | Facility: CLINIC | Age: 64
End: 2019-01-29
Payer: COMMERCIAL

## 2019-01-29 DIAGNOSIS — E53.8 VITAMIN B 12 DEFICIENCY: Primary | ICD-10-CM

## 2019-01-29 PROCEDURE — 96372 THER/PROPH/DIAG INJ SC/IM: CPT | Performed by: INTERNAL MEDICINE

## 2019-01-29 RX ADMIN — CYANOCOBALAMIN 1000 MCG: 1000 INJECTION INTRAMUSCULAR; SUBCUTANEOUS at 09:51:00

## 2019-01-29 NOTE — TELEPHONE ENCOUNTER
Current Outpatient Medications:   •  Acetaminophen-Codeine (TYLENOL WITH CODEINE #3) 300-30 MG Oral Tab, Take 1 tablet by mouth every 4 (four) hours as needed for Pain., Disp: 42 tablet, Rfl: 0

## 2019-02-01 ENCOUNTER — TELEPHONE (OUTPATIENT)
Dept: INTERNAL MEDICINE CLINIC | Facility: CLINIC | Age: 64
End: 2019-02-01

## 2019-02-04 ENCOUNTER — NURSE ONLY (OUTPATIENT)
Dept: INTERNAL MEDICINE CLINIC | Facility: CLINIC | Age: 64
End: 2019-02-04
Payer: COMMERCIAL

## 2019-02-04 ENCOUNTER — ANTI-COAG VISIT (OUTPATIENT)
Dept: INTERNAL MEDICINE CLINIC | Facility: CLINIC | Age: 64
End: 2019-02-04
Payer: COMMERCIAL

## 2019-02-04 DIAGNOSIS — Z79.01 LONG TERM (CURRENT) USE OF ANTICOAGULANTS: ICD-10-CM

## 2019-02-04 DIAGNOSIS — Z51.81 ENCOUNTER FOR THERAPEUTIC DRUG MONITORING: ICD-10-CM

## 2019-02-04 DIAGNOSIS — E53.8 VITAMIN B 12 DEFICIENCY: Primary | ICD-10-CM

## 2019-02-04 DIAGNOSIS — I48.20 CHRONIC ATRIAL FIBRILLATION (HCC): ICD-10-CM

## 2019-02-04 LAB — INR: 3 (ref 2–3)

## 2019-02-04 PROCEDURE — 96372 THER/PROPH/DIAG INJ SC/IM: CPT | Performed by: INTERNAL MEDICINE

## 2019-02-04 PROCEDURE — 36416 COLLJ CAPILLARY BLOOD SPEC: CPT

## 2019-02-04 PROCEDURE — 85610 PROTHROMBIN TIME: CPT

## 2019-02-04 RX ADMIN — CYANOCOBALAMIN 1000 MCG: 1000 INJECTION INTRAMUSCULAR; SUBCUTANEOUS at 10:00:00

## 2019-02-12 ENCOUNTER — NURSE ONLY (OUTPATIENT)
Dept: INTERNAL MEDICINE CLINIC | Facility: CLINIC | Age: 64
End: 2019-02-12
Payer: COMMERCIAL

## 2019-02-12 DIAGNOSIS — E53.8 VITAMIN B 12 DEFICIENCY: Primary | ICD-10-CM

## 2019-02-12 PROCEDURE — 96372 THER/PROPH/DIAG INJ SC/IM: CPT | Performed by: INTERNAL MEDICINE

## 2019-02-12 RX ADMIN — CYANOCOBALAMIN 1000 MCG: 1000 INJECTION INTRAMUSCULAR; SUBCUTANEOUS at 09:46:00

## 2019-02-19 ENCOUNTER — NURSE ONLY (OUTPATIENT)
Dept: INTERNAL MEDICINE CLINIC | Facility: CLINIC | Age: 64
End: 2019-02-19
Payer: COMMERCIAL

## 2019-02-19 DIAGNOSIS — E53.8 VITAMIN B 12 DEFICIENCY: Primary | ICD-10-CM

## 2019-02-19 PROCEDURE — 96372 THER/PROPH/DIAG INJ SC/IM: CPT | Performed by: INTERNAL MEDICINE

## 2019-02-19 RX ADMIN — CYANOCOBALAMIN 1000 MCG: 1000 INJECTION INTRAMUSCULAR; SUBCUTANEOUS at 10:07:00

## 2019-02-19 NOTE — TELEPHONE ENCOUNTER
Review pended refill request as it does not fall under a protocol.     Last Rx:  1/23/19  LOV:  1/25/19

## 2019-02-20 ENCOUNTER — OFFICE VISIT (OUTPATIENT)
Dept: SURGERY | Facility: CLINIC | Age: 64
End: 2019-02-20
Payer: COMMERCIAL

## 2019-02-20 VITALS
RESPIRATION RATE: 18 BRPM | OXYGEN SATURATION: 98 % | WEIGHT: 296 LBS | BODY MASS INDEX: 36.8 KG/M2 | HEIGHT: 75 IN | HEART RATE: 78 BPM | SYSTOLIC BLOOD PRESSURE: 120 MMHG | DIASTOLIC BLOOD PRESSURE: 78 MMHG

## 2019-02-20 DIAGNOSIS — E66.9 OBESITY (BMI 30-39.9): ICD-10-CM

## 2019-02-20 DIAGNOSIS — I87.2 VENOUS INSUFFICIENCY OF BOTH LOWER EXTREMITIES: ICD-10-CM

## 2019-02-20 DIAGNOSIS — E78.2 MIXED HYPERLIPIDEMIA: ICD-10-CM

## 2019-02-20 DIAGNOSIS — I10 ESSENTIAL HYPERTENSION WITH GOAL BLOOD PRESSURE LESS THAN 140/90: Primary | ICD-10-CM

## 2019-02-20 DIAGNOSIS — R63.2 INCREASED APPETITE: ICD-10-CM

## 2019-02-20 PROCEDURE — 99214 OFFICE O/P EST MOD 30 MIN: CPT | Performed by: INTERNAL MEDICINE

## 2019-02-20 RX ORDER — ACETAMINOPHEN AND CODEINE PHOSPHATE 300; 30 MG/1; MG/1
1 TABLET ORAL EVERY 4 HOURS PRN
Qty: 42 TABLET | Refills: 0 | OUTPATIENT
Start: 2019-02-20 | End: 2019-11-25

## 2019-02-20 NOTE — TELEPHONE ENCOUNTER
Please call in RX for patient and call patient to notify them of rx refill. Thank you.  Please respond to pool: [facility] LPN/CMA

## 2019-02-20 NOTE — PROGRESS NOTES
[address] [place] [address],[address]  Dept: [phone]     Date:   10/1/18    Patient:  Franki De Jesus  :      3/22/1955  MRN:      [id number]    Chief Complaint:  Rodrigo Doing 1 TABLET BY MOUTH  EVERY NIGHT AT BEDTIME Disp: 90 tablet Rfl: 3   CARVEDILOL 25 MG Oral Tab TAKE 1 TABLET BY MOUTH TWO  TIMES DAILY WITH MEALS Disp: 172 tablet Rfl: 3   WARFARIN SODIUM 5 MG Oral Tab TAKE AS DIRECTED BY  ANTICOAGULATION CLINIC: 1.5 TABLETS Physically abused: Not on file        Forced sexual activity: Not on file    Other Topics      Concerns:         Service: Not Asked        Blood Transfusions: Not Asked        Caffeine Concern: Yes          tea, coffee, 8 cups daily        Occupati snacks:  Chips, left overs  · Amount of soda consumption per day:  Diet coke daily, coffee  · Amount of water (in ounces) per day:  <48oz  · Drinking between meals only:  no  · Toughest challenge:  Logging foods, soda, exercise  · ETOH consumption- few tavares Judgment and thought content normal.   Vitals reviewed. ASSESSMENT     HYPERTENSION:  The patient's blood pressure has been well controlled.   he has been checking it as instructed and has remained in relatively good control.     HYPERCHOLESTEROLEMIA:  T Keep a food log   2. Drink 48-64 ounces of water per day. No fruit juices or regular soda. 3. Increase aerobic exercises (goal is 150 minutes per week)  4. Increase fruit and vegetable servings/day  5. Keep carbs at or below 100g/day  6.  Avoid skipping me

## 2019-02-22 ENCOUNTER — OFFICE VISIT (OUTPATIENT)
Dept: PULMONOLOGY | Facility: CLINIC | Age: 64
End: 2019-02-22
Payer: COMMERCIAL

## 2019-02-22 VITALS
HEIGHT: 75 IN | OXYGEN SATURATION: 96 % | HEART RATE: 62 BPM | WEIGHT: 296 LBS | BODY MASS INDEX: 36.8 KG/M2 | SYSTOLIC BLOOD PRESSURE: 126 MMHG | DIASTOLIC BLOOD PRESSURE: 86 MMHG

## 2019-02-22 DIAGNOSIS — D86.0 PULMONARY SARCOIDOSIS (HCC): Primary | ICD-10-CM

## 2019-02-22 DIAGNOSIS — G47.33 OSA (OBSTRUCTIVE SLEEP APNEA): ICD-10-CM

## 2019-02-22 PROCEDURE — 99212 OFFICE O/P EST SF 10 MIN: CPT | Performed by: INTERNAL MEDICINE

## 2019-02-22 PROCEDURE — 99204 OFFICE O/P NEW MOD 45 MIN: CPT | Performed by: INTERNAL MEDICINE

## 2019-02-22 NOTE — H&P
Referring Physician  Rudy Carmona MD    Chief Complaint  Sleep apnea    History of Present Illness  Is a 34-year-old male who presents the pulmonary clinic for establishing care with new physician for underlying obstructive sleep apnea.   Patient ad 1 TABLET BY MOUTH TWO  TIMES DAILY WITH MEALS Disp: 172 tablet Rfl: 3   WARFARIN SODIUM 5 MG Oral Tab TAKE AS DIRECTED BY  ANTICOAGULATION CLINIC: 1.5 TABLETS AS DIRECTED BY  MOUTH IN EVENING Disp: 135 tablet Rfl: 3   Capsaicin 0.1 % External Cream Apply 1

## 2019-02-27 ENCOUNTER — NURSE ONLY (OUTPATIENT)
Dept: INTERNAL MEDICINE CLINIC | Facility: CLINIC | Age: 64
End: 2019-02-27
Payer: COMMERCIAL

## 2019-02-27 DIAGNOSIS — E53.8 VITAMIN B 12 DEFICIENCY: Primary | ICD-10-CM

## 2019-02-27 PROCEDURE — 96372 THER/PROPH/DIAG INJ SC/IM: CPT | Performed by: INTERNAL MEDICINE

## 2019-02-27 RX ADMIN — CYANOCOBALAMIN 1000 MCG: 1000 INJECTION INTRAMUSCULAR; SUBCUTANEOUS at 09:43:00

## 2019-03-01 ENCOUNTER — TELEPHONE (OUTPATIENT)
Dept: PULMONOLOGY | Facility: CLINIC | Age: 64
End: 2019-03-01

## 2019-03-01 DIAGNOSIS — G47.33 OSA (OBSTRUCTIVE SLEEP APNEA): Primary | ICD-10-CM

## 2019-03-01 NOTE — TELEPHONE ENCOUNTER
Per Dr. Karlee Sherman he is requesting download which is needed to order new CPAP. Spoke to Ly Yates at [facility], they can do download with pts data card but they need order for CPAP download. Order faxed to [facility] [phone]. Fax confirmation received.   Pt informed

## 2019-03-01 NOTE — TELEPHONE ENCOUNTER
Tosin called back stating pt wanted order for new CPAP. Se OV note 2/22/19, Dr. Robin Blair will order new CPAP. Tosin informed need download first to order CPAP. Tosin states she will call the pt on Monday and facilitate download.

## 2019-03-01 NOTE — TELEPHONE ENCOUNTER
Spoke to Tosin at [facility] she states she will facilitate download for pt. Pt transferred to Tosin to discuss download.

## 2019-03-04 ENCOUNTER — NURSE ONLY (OUTPATIENT)
Dept: INTERNAL MEDICINE CLINIC | Facility: CLINIC | Age: 64
End: 2019-03-04
Payer: COMMERCIAL

## 2019-03-04 ENCOUNTER — ANTI-COAG VISIT (OUTPATIENT)
Dept: INTERNAL MEDICINE CLINIC | Facility: CLINIC | Age: 64
End: 2019-03-04
Payer: COMMERCIAL

## 2019-03-04 DIAGNOSIS — Z79.01 LONG TERM (CURRENT) USE OF ANTICOAGULANTS: ICD-10-CM

## 2019-03-04 DIAGNOSIS — Z51.81 ENCOUNTER FOR THERAPEUTIC DRUG MONITORING: ICD-10-CM

## 2019-03-04 DIAGNOSIS — I48.20 CHRONIC ATRIAL FIBRILLATION (HCC): ICD-10-CM

## 2019-03-04 DIAGNOSIS — E53.8 VITAMIN B12 DEFICIENCY: Primary | ICD-10-CM

## 2019-03-04 LAB — INR: 2.9 (ref 2–3)

## 2019-03-04 PROCEDURE — 96372 THER/PROPH/DIAG INJ SC/IM: CPT | Performed by: INTERNAL MEDICINE

## 2019-03-04 PROCEDURE — 36416 COLLJ CAPILLARY BLOOD SPEC: CPT

## 2019-03-04 PROCEDURE — 85610 PROTHROMBIN TIME: CPT

## 2019-03-04 RX ADMIN — CYANOCOBALAMIN 1000 MCG: 1000 INJECTION INTRAMUSCULAR; SUBCUTANEOUS at 09:23:00

## 2019-03-04 NOTE — PROGRESS NOTES
Patient is here for B-12. Name,  and order verified. B-12 1,000 mcg given right deltoid. Patient tolerated well.

## 2019-03-12 ENCOUNTER — NURSE ONLY (OUTPATIENT)
Dept: INTERNAL MEDICINE CLINIC | Facility: CLINIC | Age: 64
End: 2019-03-12
Payer: COMMERCIAL

## 2019-03-12 DIAGNOSIS — E53.8 VITAMIN B 12 DEFICIENCY: Primary | ICD-10-CM

## 2019-03-12 PROCEDURE — 96372 THER/PROPH/DIAG INJ SC/IM: CPT | Performed by: INTERNAL MEDICINE

## 2019-03-12 RX ADMIN — CYANOCOBALAMIN 1000 MCG: 1000 INJECTION INTRAMUSCULAR; SUBCUTANEOUS at 09:11:00

## 2019-03-22 ENCOUNTER — NURSE ONLY (OUTPATIENT)
Dept: INTERNAL MEDICINE CLINIC | Facility: CLINIC | Age: 64
End: 2019-03-22
Payer: COMMERCIAL

## 2019-03-22 DIAGNOSIS — E53.8 VITAMIN B 12 DEFICIENCY: Primary | ICD-10-CM

## 2019-03-22 PROCEDURE — 96372 THER/PROPH/DIAG INJ SC/IM: CPT | Performed by: INTERNAL MEDICINE

## 2019-03-22 RX ADMIN — CYANOCOBALAMIN 1000 MCG: 1000 INJECTION INTRAMUSCULAR; SUBCUTANEOUS at 09:34:00

## 2019-03-25 RX ORDER — AMLODIPINE BESYLATE 5 MG/1
TABLET ORAL
Qty: 90 TABLET | Refills: 0 | Status: SHIPPED | OUTPATIENT
Start: 2019-03-25 | End: 2019-07-01

## 2019-03-26 RX ORDER — WARFARIN SODIUM 5 MG/1
TABLET ORAL
Qty: 135 TABLET | Refills: 3 | Status: ON HOLD | OUTPATIENT
Start: 2019-03-26 | End: 2020-09-04

## 2019-03-26 NOTE — TELEPHONE ENCOUNTER
Review pended refill request as it does not fall under a protocol.     Last Rx: 11-22-17  LOV: 1-25-19        Hypertensive Medications  Protocol Criteria:  · Appointment scheduled in the past 6 months or in the next 3 months  · BMP or CMP in the past 12 mon

## 2019-03-29 ENCOUNTER — NURSE ONLY (OUTPATIENT)
Dept: INTERNAL MEDICINE CLINIC | Facility: CLINIC | Age: 64
End: 2019-03-29
Payer: COMMERCIAL

## 2019-03-29 ENCOUNTER — ANTI-COAG VISIT (OUTPATIENT)
Dept: INTERNAL MEDICINE CLINIC | Facility: CLINIC | Age: 64
End: 2019-03-29
Payer: COMMERCIAL

## 2019-03-29 DIAGNOSIS — E53.8 B12 DEFICIENCY: Primary | ICD-10-CM

## 2019-03-29 DIAGNOSIS — I48.20 CHRONIC ATRIAL FIBRILLATION (HCC): ICD-10-CM

## 2019-03-29 DIAGNOSIS — Z51.81 ENCOUNTER FOR THERAPEUTIC DRUG MONITORING: ICD-10-CM

## 2019-03-29 DIAGNOSIS — Z79.01 LONG TERM (CURRENT) USE OF ANTICOAGULANTS: ICD-10-CM

## 2019-03-29 LAB — INR: 2.5 (ref 2–3)

## 2019-03-29 PROCEDURE — 36416 COLLJ CAPILLARY BLOOD SPEC: CPT

## 2019-03-29 PROCEDURE — 96372 THER/PROPH/DIAG INJ SC/IM: CPT | Performed by: INTERNAL MEDICINE

## 2019-03-29 PROCEDURE — 85610 PROTHROMBIN TIME: CPT

## 2019-03-29 RX ADMIN — CYANOCOBALAMIN 1000 MCG: 1000 INJECTION INTRAMUSCULAR; SUBCUTANEOUS at 09:30:00

## 2019-03-29 NOTE — PROGRESS NOTES
Patient presents to office for weekly  B12 injection. Order verified. Patient tolerated injection well no reactions. OV 1/08/2019   5. Vitamin B12 deficiency     Start B12 injections weekly x 12 weeks then monthly.

## 2019-04-29 ENCOUNTER — NURSE ONLY (OUTPATIENT)
Dept: INTERNAL MEDICINE CLINIC | Facility: CLINIC | Age: 64
End: 2019-04-29
Payer: COMMERCIAL

## 2019-04-29 ENCOUNTER — ANTI-COAG VISIT (OUTPATIENT)
Dept: INTERNAL MEDICINE CLINIC | Facility: CLINIC | Age: 64
End: 2019-04-29
Payer: COMMERCIAL

## 2019-04-29 DIAGNOSIS — Z51.81 ENCOUNTER FOR THERAPEUTIC DRUG MONITORING: ICD-10-CM

## 2019-04-29 DIAGNOSIS — Z79.01 LONG TERM (CURRENT) USE OF ANTICOAGULANTS: ICD-10-CM

## 2019-04-29 DIAGNOSIS — I48.20 CHRONIC ATRIAL FIBRILLATION (HCC): ICD-10-CM

## 2019-04-29 DIAGNOSIS — E53.8 B12 DEFICIENCY: Primary | ICD-10-CM

## 2019-04-29 PROCEDURE — 96372 THER/PROPH/DIAG INJ SC/IM: CPT | Performed by: INTERNAL MEDICINE

## 2019-04-29 PROCEDURE — 85610 PROTHROMBIN TIME: CPT

## 2019-04-29 PROCEDURE — 36416 COLLJ CAPILLARY BLOOD SPEC: CPT

## 2019-04-29 RX ADMIN — CYANOCOBALAMIN 1000 MCG: 1000 INJECTION INTRAMUSCULAR; SUBCUTANEOUS at 10:04:00

## 2019-05-29 ENCOUNTER — NURSE ONLY (OUTPATIENT)
Dept: INTERNAL MEDICINE CLINIC | Facility: CLINIC | Age: 64
End: 2019-05-29
Payer: COMMERCIAL

## 2019-05-29 ENCOUNTER — ANTI-COAG VISIT (OUTPATIENT)
Dept: INTERNAL MEDICINE CLINIC | Facility: CLINIC | Age: 64
End: 2019-05-29
Payer: COMMERCIAL

## 2019-05-29 DIAGNOSIS — I48.20 CHRONIC ATRIAL FIBRILLATION (HCC): ICD-10-CM

## 2019-05-29 DIAGNOSIS — E53.8 VITAMIN B 12 DEFICIENCY: Primary | ICD-10-CM

## 2019-05-29 DIAGNOSIS — Z79.01 LONG TERM (CURRENT) USE OF ANTICOAGULANTS: ICD-10-CM

## 2019-05-29 DIAGNOSIS — Z51.81 ENCOUNTER FOR THERAPEUTIC DRUG MONITORING: ICD-10-CM

## 2019-05-29 PROCEDURE — 36416 COLLJ CAPILLARY BLOOD SPEC: CPT

## 2019-05-29 PROCEDURE — 85610 PROTHROMBIN TIME: CPT

## 2019-05-29 PROCEDURE — 96372 THER/PROPH/DIAG INJ SC/IM: CPT | Performed by: INTERNAL MEDICINE

## 2019-05-29 RX ADMIN — CYANOCOBALAMIN 1000 MCG: 1000 INJECTION INTRAMUSCULAR; SUBCUTANEOUS at 09:49:00

## 2019-06-12 DIAGNOSIS — R63.5 WEIGHT GAIN: ICD-10-CM

## 2019-06-12 DIAGNOSIS — Z51.81 ENCOUNTER FOR THERAPEUTIC DRUG MONITORING: ICD-10-CM

## 2019-06-12 DIAGNOSIS — R63.2 INCREASED APPETITE: ICD-10-CM

## 2019-06-12 RX ORDER — TOPIRAMATE 25 MG/1
TABLET ORAL
Qty: 180 TABLET | Refills: 2 | OUTPATIENT
Start: 2019-06-12

## 2019-07-01 ENCOUNTER — NURSE ONLY (OUTPATIENT)
Dept: INTERNAL MEDICINE CLINIC | Facility: CLINIC | Age: 64
End: 2019-07-01
Payer: COMMERCIAL

## 2019-07-01 ENCOUNTER — ANTI-COAG VISIT (OUTPATIENT)
Dept: INTERNAL MEDICINE CLINIC | Facility: CLINIC | Age: 64
End: 2019-07-01
Payer: COMMERCIAL

## 2019-07-01 DIAGNOSIS — I48.20 CHRONIC ATRIAL FIBRILLATION (HCC): ICD-10-CM

## 2019-07-01 DIAGNOSIS — Z51.81 ENCOUNTER FOR THERAPEUTIC DRUG MONITORING: ICD-10-CM

## 2019-07-01 DIAGNOSIS — Z79.01 LONG TERM (CURRENT) USE OF ANTICOAGULANTS: ICD-10-CM

## 2019-07-01 DIAGNOSIS — E53.8 B12 DEFICIENCY: Primary | ICD-10-CM

## 2019-07-01 LAB — INR: 2.1 (ref 0.8–1.2)

## 2019-07-01 PROCEDURE — 96372 THER/PROPH/DIAG INJ SC/IM: CPT | Performed by: INTERNAL MEDICINE

## 2019-07-01 PROCEDURE — 85610 PROTHROMBIN TIME: CPT

## 2019-07-01 PROCEDURE — 36416 COLLJ CAPILLARY BLOOD SPEC: CPT

## 2019-07-01 RX ORDER — AMLODIPINE BESYLATE 5 MG/1
TABLET ORAL
Qty: 90 TABLET | Refills: 1 | Status: SHIPPED | OUTPATIENT
Start: 2019-07-01 | End: 2019-12-07

## 2019-07-01 RX ADMIN — CYANOCOBALAMIN 1000 MCG: 1000 INJECTION INTRAMUSCULAR; SUBCUTANEOUS at 09:13:00

## 2019-07-01 NOTE — TELEPHONE ENCOUNTER
Refill passed per [facility], [facility] protocol.     Hypertensive Medications  Protocol Criteria:  · Appointment scheduled in the past 6 months or in the next 3 months  · BMP or CMP in the past 12 months  · Creatinine result < 2  Recent Outpatient Visits

## 2019-07-01 NOTE — PROGRESS NOTES
Patient presents to office for B12 injection. Order verified under MAR. Patient tolerated injection well no reactions.

## 2019-07-15 DIAGNOSIS — R63.5 WEIGHT GAIN: ICD-10-CM

## 2019-07-15 DIAGNOSIS — R63.2 INCREASED APPETITE: ICD-10-CM

## 2019-07-15 DIAGNOSIS — Z51.81 ENCOUNTER FOR THERAPEUTIC DRUG MONITORING: ICD-10-CM

## 2019-07-15 RX ORDER — TOPIRAMATE 25 MG/1
TABLET ORAL
Qty: 180 TABLET | Refills: 2 | OUTPATIENT
Start: 2019-07-15

## 2019-08-05 ENCOUNTER — NURSE ONLY (OUTPATIENT)
Dept: INTERNAL MEDICINE CLINIC | Facility: CLINIC | Age: 64
End: 2019-08-05
Payer: COMMERCIAL

## 2019-08-05 ENCOUNTER — ANTI-COAG VISIT (OUTPATIENT)
Dept: INTERNAL MEDICINE CLINIC | Facility: CLINIC | Age: 64
End: 2019-08-05
Payer: COMMERCIAL

## 2019-08-05 DIAGNOSIS — I48.20 CHRONIC ATRIAL FIBRILLATION (HCC): ICD-10-CM

## 2019-08-05 DIAGNOSIS — E53.8 VITAMIN B 12 DEFICIENCY: Primary | ICD-10-CM

## 2019-08-05 DIAGNOSIS — Z51.81 ENCOUNTER FOR THERAPEUTIC DRUG MONITORING: ICD-10-CM

## 2019-08-05 DIAGNOSIS — Z79.01 LONG TERM (CURRENT) USE OF ANTICOAGULANTS: ICD-10-CM

## 2019-08-05 LAB — INR: 2.2 (ref 2–3)

## 2019-08-05 PROCEDURE — 96372 THER/PROPH/DIAG INJ SC/IM: CPT | Performed by: INTERNAL MEDICINE

## 2019-08-05 PROCEDURE — 85610 PROTHROMBIN TIME: CPT

## 2019-08-05 PROCEDURE — 36416 COLLJ CAPILLARY BLOOD SPEC: CPT

## 2019-08-05 RX ADMIN — CYANOCOBALAMIN 1000 MCG: 1000 INJECTION INTRAMUSCULAR; SUBCUTANEOUS at 08:51:00

## 2019-09-04 ENCOUNTER — NURSE ONLY (OUTPATIENT)
Dept: INTERNAL MEDICINE CLINIC | Facility: CLINIC | Age: 64
End: 2019-09-04
Payer: COMMERCIAL

## 2019-09-04 ENCOUNTER — ANTI-COAG VISIT (OUTPATIENT)
Dept: INTERNAL MEDICINE CLINIC | Facility: CLINIC | Age: 64
End: 2019-09-04
Payer: COMMERCIAL

## 2019-09-04 DIAGNOSIS — E53.8 B12 DEFICIENCY: Primary | ICD-10-CM

## 2019-09-04 DIAGNOSIS — Z51.81 ENCOUNTER FOR THERAPEUTIC DRUG MONITORING: ICD-10-CM

## 2019-09-04 DIAGNOSIS — I48.20 CHRONIC ATRIAL FIBRILLATION (HCC): ICD-10-CM

## 2019-09-04 DIAGNOSIS — Z79.01 LONG TERM (CURRENT) USE OF ANTICOAGULANTS: ICD-10-CM

## 2019-09-04 LAB
INR: 3.1 (ref 0.8–1.2)
TEST STRIP LOT #: ABNORMAL NUMERIC

## 2019-09-04 PROCEDURE — 85610 PROTHROMBIN TIME: CPT

## 2019-09-04 PROCEDURE — 96372 THER/PROPH/DIAG INJ SC/IM: CPT | Performed by: INTERNAL MEDICINE

## 2019-09-04 PROCEDURE — 36416 COLLJ CAPILLARY BLOOD SPEC: CPT

## 2019-09-04 RX ADMIN — CYANOCOBALAMIN 1000 MCG: 1000 INJECTION INTRAMUSCULAR; SUBCUTANEOUS at 08:54:00

## 2019-09-04 NOTE — PROGRESS NOTES
Patient presents to office for b12 injection. Order verified, tolerated injection well no reactions.

## 2019-10-07 ENCOUNTER — ANTI-COAG VISIT (OUTPATIENT)
Dept: INTERNAL MEDICINE CLINIC | Facility: CLINIC | Age: 64
End: 2019-10-07
Payer: COMMERCIAL

## 2019-10-07 ENCOUNTER — NURSE ONLY (OUTPATIENT)
Dept: INTERNAL MEDICINE CLINIC | Facility: CLINIC | Age: 64
End: 2019-10-07
Payer: COMMERCIAL

## 2019-10-07 DIAGNOSIS — E53.8 VITAMIN B12 DEFICIENCY: ICD-10-CM

## 2019-10-07 DIAGNOSIS — Z79.01 LONG TERM (CURRENT) USE OF ANTICOAGULANTS: ICD-10-CM

## 2019-10-07 DIAGNOSIS — Z51.81 ENCOUNTER FOR THERAPEUTIC DRUG MONITORING: ICD-10-CM

## 2019-10-07 DIAGNOSIS — I48.20 CHRONIC ATRIAL FIBRILLATION (HCC): ICD-10-CM

## 2019-10-07 PROCEDURE — 85610 PROTHROMBIN TIME: CPT

## 2019-10-07 PROCEDURE — 36416 COLLJ CAPILLARY BLOOD SPEC: CPT

## 2019-10-07 PROCEDURE — 96372 THER/PROPH/DIAG INJ SC/IM: CPT | Performed by: INTERNAL MEDICINE

## 2019-10-07 RX ADMIN — CYANOCOBALAMIN 1000 MCG: 1000 INJECTION INTRAMUSCULAR; SUBCUTANEOUS at 19:19:00

## 2019-10-08 NOTE — PROGRESS NOTES
Pt. Came in for a Vit B12 inj. Name, , allergies and order verified. Given in rt. Deltoid and tolerated well.

## 2019-10-19 RX ORDER — CARVEDILOL 25 MG/1
TABLET ORAL
Qty: 180 TABLET | Refills: 0 | Status: SHIPPED | OUTPATIENT
Start: 2019-10-19 | End: 2019-12-07

## 2019-10-19 NOTE — TELEPHONE ENCOUNTER
To reception staff, pls call pt for appt. Also [employer] message sent to pt.        Hypertensive Medications  Protocol Criteria:  · Appointment scheduled in the past 6 months or in the next 3 months  · BMP or CMP in the past 12 months  · Creatinine result <

## 2019-11-04 ENCOUNTER — NURSE ONLY (OUTPATIENT)
Dept: INTERNAL MEDICINE CLINIC | Facility: CLINIC | Age: 64
End: 2019-11-04
Payer: COMMERCIAL

## 2019-11-04 ENCOUNTER — ANTI-COAG VISIT (OUTPATIENT)
Dept: INTERNAL MEDICINE CLINIC | Facility: CLINIC | Age: 64
End: 2019-11-04
Payer: COMMERCIAL

## 2019-11-04 DIAGNOSIS — Z51.81 ENCOUNTER FOR THERAPEUTIC DRUG MONITORING: ICD-10-CM

## 2019-11-04 DIAGNOSIS — I48.20 CHRONIC ATRIAL FIBRILLATION (HCC): ICD-10-CM

## 2019-11-04 DIAGNOSIS — E53.8 B12 DEFICIENCY: Primary | ICD-10-CM

## 2019-11-04 DIAGNOSIS — Z79.01 LONG TERM (CURRENT) USE OF ANTICOAGULANTS: ICD-10-CM

## 2019-11-04 PROCEDURE — 96372 THER/PROPH/DIAG INJ SC/IM: CPT | Performed by: INTERNAL MEDICINE

## 2019-11-04 PROCEDURE — 36416 COLLJ CAPILLARY BLOOD SPEC: CPT

## 2019-11-04 PROCEDURE — 85610 PROTHROMBIN TIME: CPT

## 2019-11-04 RX ADMIN — CYANOCOBALAMIN 1000 MCG: 1000 INJECTION INTRAMUSCULAR; SUBCUTANEOUS at 08:50:00

## 2019-11-25 ENCOUNTER — OFFICE VISIT (OUTPATIENT)
Dept: INTERNAL MEDICINE CLINIC | Facility: CLINIC | Age: 64
End: 2019-11-25
Payer: COMMERCIAL

## 2019-11-25 ENCOUNTER — ANTI-COAG VISIT (OUTPATIENT)
Dept: INTERNAL MEDICINE CLINIC | Facility: CLINIC | Age: 64
End: 2019-11-25

## 2019-11-25 ENCOUNTER — APPOINTMENT (OUTPATIENT)
Dept: LAB | Facility: HOSPITAL | Age: 64
End: 2019-11-25
Attending: INTERNAL MEDICINE
Payer: COMMERCIAL

## 2019-11-25 VITALS
SYSTOLIC BLOOD PRESSURE: 135 MMHG | HEART RATE: 100 BPM | HEIGHT: 75 IN | WEIGHT: 303 LBS | RESPIRATION RATE: 16 BRPM | BODY MASS INDEX: 37.67 KG/M2 | DIASTOLIC BLOOD PRESSURE: 89 MMHG

## 2019-11-25 DIAGNOSIS — E66.01 MORBID OBESITY DUE TO EXCESS CALORIES (HCC): ICD-10-CM

## 2019-11-25 DIAGNOSIS — I48.20 CHRONIC ATRIAL FIBRILLATION (HCC): ICD-10-CM

## 2019-11-25 DIAGNOSIS — Z12.5 SCREENING FOR PROSTATE CANCER: ICD-10-CM

## 2019-11-25 DIAGNOSIS — Z79.01 LONG TERM (CURRENT) USE OF ANTICOAGULANTS: ICD-10-CM

## 2019-11-25 DIAGNOSIS — Z51.81 ENCOUNTER FOR THERAPEUTIC DRUG MONITORING: ICD-10-CM

## 2019-11-25 DIAGNOSIS — G47.33 OSA (OBSTRUCTIVE SLEEP APNEA): ICD-10-CM

## 2019-11-25 DIAGNOSIS — I10 ESSENTIAL HYPERTENSION WITH GOAL BLOOD PRESSURE LESS THAN 140/90: Primary | ICD-10-CM

## 2019-11-25 PROBLEM — E66.9 OBESITY (BMI 30-39.9): Status: RESOLVED | Noted: 2019-02-20 | Resolved: 2019-11-25

## 2019-11-25 PROCEDURE — 80053 COMPREHEN METABOLIC PANEL: CPT | Performed by: INTERNAL MEDICINE

## 2019-11-25 PROCEDURE — 80061 LIPID PANEL: CPT | Performed by: INTERNAL MEDICINE

## 2019-11-25 PROCEDURE — 85610 PROTHROMBIN TIME: CPT | Performed by: INTERNAL MEDICINE

## 2019-11-25 PROCEDURE — 36415 COLL VENOUS BLD VENIPUNCTURE: CPT | Performed by: INTERNAL MEDICINE

## 2019-11-25 PROCEDURE — 99212 OFFICE O/P EST SF 10 MIN: CPT | Performed by: INTERNAL MEDICINE

## 2019-11-25 PROCEDURE — 81003 URINALYSIS AUTO W/O SCOPE: CPT | Performed by: INTERNAL MEDICINE

## 2019-11-25 PROCEDURE — 93793 ANTICOAG MGMT PT WARFARIN: CPT | Performed by: INTERNAL MEDICINE

## 2019-11-25 PROCEDURE — 84443 ASSAY THYROID STIM HORMONE: CPT | Performed by: INTERNAL MEDICINE

## 2019-11-25 PROCEDURE — 99214 OFFICE O/P EST MOD 30 MIN: CPT | Performed by: INTERNAL MEDICINE

## 2019-11-25 PROCEDURE — 85025 COMPLETE CBC W/AUTO DIFF WBC: CPT | Performed by: INTERNAL MEDICINE

## 2019-11-25 RX ORDER — ACETAMINOPHEN AND CODEINE PHOSPHATE 300; 30 MG/1; MG/1
1 TABLET ORAL EVERY 4 HOURS PRN
Qty: 42 TABLET | Refills: 0 | Status: ON HOLD | OUTPATIENT
Start: 2019-11-25 | End: 2020-07-08

## 2019-11-25 NOTE — PROGRESS NOTES
Gabriel Landau is a 71year old male. HPI:     1. Essential hypertension with goal <140/90    Patient has been following low salt diet and has been taking anti-hypertensive prescriptions as prescribed.  Blood pressure has been checked and is under good con CLINIC: 1  AND 1/2 TABLETS BY MOUTH AS DIRECTED IN EVENING 135 tablet 3   • Acetaminophen-Codeine (TYLENOL WITH CODEINE #3) 300-30 MG Oral Tab Take 1 tablet by mouth every 4 (four) hours as needed for Pain.  42 tablet 0   • ATORVASTATIN 10 MG Oral Tab TAKE Sitting, Cuff Size: large)   Pulse 100   Resp 16   Ht 6' 3\" (1.905 m)   Wt (!) 303 lb (137.4 kg)   BMI 37.87 kg/m²     GENERAL: well developed, well nourished,in no apparent distress  SKIN: no rashes,no suspicious lesions  HEENT: atraumatic, normocephalic under 140/90 regularly before any BP raising pills can be considered. 4. MITCHEL (obstructive sleep apnea)    Continue to use CPAP as directed at stated parameters on a nitely basis.  Call if machinery or equipment not working correctly or symptoms of snori

## 2019-12-02 ENCOUNTER — NURSE ONLY (OUTPATIENT)
Dept: INTERNAL MEDICINE CLINIC | Facility: CLINIC | Age: 64
End: 2019-12-02
Payer: COMMERCIAL

## 2019-12-02 ENCOUNTER — ANTI-COAG VISIT (OUTPATIENT)
Dept: INTERNAL MEDICINE CLINIC | Facility: CLINIC | Age: 64
End: 2019-12-02
Payer: COMMERCIAL

## 2019-12-02 DIAGNOSIS — E53.8 B12 DEFICIENCY: Primary | ICD-10-CM

## 2019-12-02 DIAGNOSIS — I48.20 CHRONIC ATRIAL FIBRILLATION (HCC): ICD-10-CM

## 2019-12-02 DIAGNOSIS — Z51.81 ENCOUNTER FOR THERAPEUTIC DRUG MONITORING: ICD-10-CM

## 2019-12-02 DIAGNOSIS — Z79.01 LONG TERM (CURRENT) USE OF ANTICOAGULANTS: ICD-10-CM

## 2019-12-02 PROCEDURE — 96372 THER/PROPH/DIAG INJ SC/IM: CPT | Performed by: INTERNAL MEDICINE

## 2019-12-02 PROCEDURE — 36416 COLLJ CAPILLARY BLOOD SPEC: CPT

## 2019-12-02 PROCEDURE — 85610 PROTHROMBIN TIME: CPT

## 2019-12-02 RX ADMIN — CYANOCOBALAMIN 1000 MCG: 1000 INJECTION INTRAMUSCULAR; SUBCUTANEOUS at 08:28:00

## 2019-12-02 NOTE — PROGRESS NOTES
Order was verified along with pt's name, date of birth and injection receiving. Pt was given the vitamin B12 injection in the right deltoid and pt tolerated the injection well.

## 2019-12-07 RX ORDER — CARVEDILOL 25 MG/1
TABLET ORAL
Qty: 180 TABLET | Refills: 1 | Status: ON HOLD | OUTPATIENT
Start: 2019-12-07 | End: 2020-09-04

## 2019-12-07 RX ORDER — AMLODIPINE BESYLATE 5 MG/1
TABLET ORAL
Qty: 90 TABLET | Refills: 1 | Status: SHIPPED | OUTPATIENT
Start: 2019-12-07 | End: 2020-06-25

## 2019-12-07 RX ORDER — ATORVASTATIN CALCIUM 10 MG/1
TABLET, FILM COATED ORAL
Qty: 90 TABLET | Refills: 1 | Status: SHIPPED | OUTPATIENT
Start: 2019-12-07 | End: 2020-06-25

## 2019-12-07 NOTE — TELEPHONE ENCOUNTER
Refill passed per [facility], [facility] protocol.   Hypertensive Medications  Protocol Criteria:  · Appointment scheduled in the past 6 months or in the next 3 months  · BMP or CMP in the past 12 months  · Creatinine result < 2  Recent Outpatient Visits [facility] Edelmira Montes De Oca MD    Office Visit    1 month ago B12 deficiency    [facility], [facility], [address] [facility]    Nurse Only    2 months ago Vitamin B12 deficiency    [facility], [facility], [place]

## 2019-12-07 NOTE — TELEPHONE ENCOUNTER
Refill passed per [facility], [facility] protocol.   Hypertensive Medications  Protocol Criteria:  · Appointment scheduled in the past 6 months or in the next 3 months  · BMP or CMP in the past 12 months  · Creatinine result < 2  Recent Outpatient Visits 503 [facility] Francesca Monte sDe Oca MD    Office Visit    1 month ago B12 deficiency    [facility], [facility], [address] [facility]    Nurse Only    2 months ago Vitamin B12 deficiency    [facility], [facility], [place]

## 2019-12-07 NOTE — TELEPHONE ENCOUNTER
Refill passed per [facility], [facility] protocol.   Hypertensive Medications  Protocol Criteria:  · Appointment scheduled in the past 6 months or in the next 3 months  · BMP or CMP in the past 12 months  · Creatinine result < 2  Recent Outpatient Visits

## 2019-12-07 NOTE — TELEPHONE ENCOUNTER
Refill passed per [facility], [facility] protocol.   Hypertensive Medications  Protocol Criteria:  · Appointment scheduled in the past 6 months or in the next 3 months  · BMP or CMP in the past 12 months  · Creatinine result < 2  Recent Outpatient Visits 503 [facility] Robin Montes De Oca MD    Office Visit    1 month ago B12 deficiency    [facility], [facility], [address] [facility]    Nurse Only    2 months ago Vitamin B12 deficiency    [facility], [facility], [place]

## 2019-12-07 NOTE — TELEPHONE ENCOUNTER
Refill passed per [facility] protocol.   Cholesterol Medications  Protocol Criteria:  · Appointment scheduled in the past 12 months or in the next 3 months  · ALT & LDL on file in the past 12 months  · ALT result < 80  · LDL result <130   Recent Outpat Notes    In 3 weeks [facility], [place]     In 3 weeks [facility] ESTHER [address], [place] B12        Lab Results   Component Value Date     (H) 11/25/2019    BUN 16 11/25/2019    [place]

## 2019-12-30 ENCOUNTER — NURSE ONLY (OUTPATIENT)
Dept: INTERNAL MEDICINE CLINIC | Facility: CLINIC | Age: 64
End: 2019-12-30
Payer: COMMERCIAL

## 2019-12-30 ENCOUNTER — ANTI-COAG VISIT (OUTPATIENT)
Dept: INTERNAL MEDICINE CLINIC | Facility: CLINIC | Age: 64
End: 2019-12-30
Payer: COMMERCIAL

## 2019-12-30 DIAGNOSIS — E53.8 B12 DEFICIENCY: Primary | ICD-10-CM

## 2019-12-30 DIAGNOSIS — I48.20 CHRONIC ATRIAL FIBRILLATION (HCC): ICD-10-CM

## 2019-12-30 DIAGNOSIS — Z51.81 ENCOUNTER FOR THERAPEUTIC DRUG MONITORING: ICD-10-CM

## 2019-12-30 DIAGNOSIS — Z79.01 LONG TERM (CURRENT) USE OF ANTICOAGULANTS: ICD-10-CM

## 2019-12-30 PROCEDURE — 85610 PROTHROMBIN TIME: CPT

## 2019-12-30 PROCEDURE — 36416 COLLJ CAPILLARY BLOOD SPEC: CPT

## 2019-12-30 PROCEDURE — 96372 THER/PROPH/DIAG INJ SC/IM: CPT | Performed by: INTERNAL MEDICINE

## 2019-12-30 RX ADMIN — CYANOCOBALAMIN 1000 MCG: 1000 INJECTION INTRAMUSCULAR; SUBCUTANEOUS at 09:02:00

## 2020-01-27 ENCOUNTER — NURSE ONLY (OUTPATIENT)
Dept: INTERNAL MEDICINE CLINIC | Facility: CLINIC | Age: 65
End: 2020-01-27
Payer: COMMERCIAL

## 2020-01-27 ENCOUNTER — ANTI-COAG VISIT (OUTPATIENT)
Dept: INTERNAL MEDICINE CLINIC | Facility: CLINIC | Age: 65
End: 2020-01-27
Payer: COMMERCIAL

## 2020-01-27 DIAGNOSIS — E53.8 B12 DEFICIENCY: Primary | ICD-10-CM

## 2020-01-27 DIAGNOSIS — Z79.01 LONG TERM (CURRENT) USE OF ANTICOAGULANTS: ICD-10-CM

## 2020-01-27 DIAGNOSIS — I48.20 CHRONIC ATRIAL FIBRILLATION (HCC): ICD-10-CM

## 2020-01-27 DIAGNOSIS — Z51.81 ENCOUNTER FOR THERAPEUTIC DRUG MONITORING: ICD-10-CM

## 2020-01-27 LAB — INR: 2.5 (ref 0.8–1.2)

## 2020-01-27 PROCEDURE — 85610 PROTHROMBIN TIME: CPT

## 2020-01-27 PROCEDURE — 96372 THER/PROPH/DIAG INJ SC/IM: CPT | Performed by: INTERNAL MEDICINE

## 2020-01-27 PROCEDURE — 36416 COLLJ CAPILLARY BLOOD SPEC: CPT

## 2020-01-27 RX ADMIN — CYANOCOBALAMIN 1000 MCG: 1000 INJECTION INTRAMUSCULAR; SUBCUTANEOUS at 08:51:00

## 2020-01-27 NOTE — PROGRESS NOTES
Order was verified along with pt's name, and date of birth. Vitamin B12 was given in the right deltoid and pt tolerated the injection well.

## 2020-02-24 ENCOUNTER — ANTI-COAG VISIT (OUTPATIENT)
Dept: INTERNAL MEDICINE CLINIC | Facility: CLINIC | Age: 65
End: 2020-02-24
Payer: COMMERCIAL

## 2020-02-24 ENCOUNTER — NURSE ONLY (OUTPATIENT)
Dept: INTERNAL MEDICINE CLINIC | Facility: CLINIC | Age: 65
End: 2020-02-24
Payer: COMMERCIAL

## 2020-02-24 DIAGNOSIS — Z79.01 LONG TERM (CURRENT) USE OF ANTICOAGULANTS: ICD-10-CM

## 2020-02-24 DIAGNOSIS — E53.8 B12 DEFICIENCY: Primary | ICD-10-CM

## 2020-02-24 DIAGNOSIS — I48.20 CHRONIC ATRIAL FIBRILLATION (HCC): ICD-10-CM

## 2020-02-24 DIAGNOSIS — Z51.81 ENCOUNTER FOR THERAPEUTIC DRUG MONITORING: ICD-10-CM

## 2020-02-24 LAB
INR: 2.8 (ref 0.8–1.2)
TEST STRIP EXPIRATION DATE: ABNORMAL DATE

## 2020-02-24 PROCEDURE — 96372 THER/PROPH/DIAG INJ SC/IM: CPT | Performed by: INTERNAL MEDICINE

## 2020-02-24 PROCEDURE — 85610 PROTHROMBIN TIME: CPT

## 2020-02-24 PROCEDURE — 36416 COLLJ CAPILLARY BLOOD SPEC: CPT

## 2020-02-24 RX ADMIN — CYANOCOBALAMIN 1000 MCG: 1000 INJECTION INTRAMUSCULAR; SUBCUTANEOUS at 08:45:00

## 2020-02-24 NOTE — PROGRESS NOTES
Pt presents to office for B12 injection. Order verified under MAR, patient tolerated injection well no reactions.

## 2020-03-06 ENCOUNTER — MED REC SCAN ONLY (OUTPATIENT)
Dept: INTERNAL MEDICINE CLINIC | Facility: CLINIC | Age: 65
End: 2020-03-06

## 2020-03-23 ENCOUNTER — NURSE ONLY (OUTPATIENT)
Dept: INTERNAL MEDICINE CLINIC | Facility: CLINIC | Age: 65
End: 2020-03-23
Payer: COMMERCIAL

## 2020-03-23 ENCOUNTER — ANTI-COAG VISIT (OUTPATIENT)
Dept: INTERNAL MEDICINE CLINIC | Facility: CLINIC | Age: 65
End: 2020-03-23
Payer: COMMERCIAL

## 2020-03-23 DIAGNOSIS — I48.20 CHRONIC ATRIAL FIBRILLATION (HCC): ICD-10-CM

## 2020-03-23 DIAGNOSIS — Z79.01 LONG TERM (CURRENT) USE OF ANTICOAGULANTS: ICD-10-CM

## 2020-03-23 DIAGNOSIS — Z51.81 ENCOUNTER FOR THERAPEUTIC DRUG MONITORING: ICD-10-CM

## 2020-03-23 DIAGNOSIS — E53.8 B12 DEFICIENCY: Primary | ICD-10-CM

## 2020-03-23 LAB
INR: 2.9 (ref 0.8–1.2)
TEST STRIP EXPIRATION DATE: ABNORMAL DATE

## 2020-03-23 PROCEDURE — 36416 COLLJ CAPILLARY BLOOD SPEC: CPT

## 2020-03-23 PROCEDURE — 96372 THER/PROPH/DIAG INJ SC/IM: CPT | Performed by: PHYSICIAN ASSISTANT

## 2020-03-23 PROCEDURE — 85610 PROTHROMBIN TIME: CPT

## 2020-03-23 RX ORDER — CYANOCOBALAMIN 1000 UG/ML
1000 INJECTION INTRAMUSCULAR; SUBCUTANEOUS ONCE
Status: CANCELLED | OUTPATIENT
Start: 2020-03-23 | End: 2020-03-23

## 2020-03-23 RX ORDER — CYANOCOBALAMIN 1000 UG/ML
1000 INJECTION INTRAMUSCULAR; SUBCUTANEOUS ONCE
Status: COMPLETED | OUTPATIENT
Start: 2020-03-23 | End: 2020-03-23

## 2020-03-23 RX ADMIN — CYANOCOBALAMIN 1000 MCG: 1000 INJECTION INTRAMUSCULAR; SUBCUTANEOUS at 10:00:00

## 2020-03-23 NOTE — PROGRESS NOTES
Pt present to office for B12 injection given right deltoid  Pt showed no reaction to injection  Pt advised to f/u with Dr. Alejandro Alva for further B12 orders

## 2020-03-25 ENCOUNTER — NURSE ONLY (OUTPATIENT)
Dept: GASTROENTEROLOGY | Facility: CLINIC | Age: 65
End: 2020-03-25

## 2020-03-25 NOTE — PROGRESS NOTES
Patient informed that we are not able to offer the telephone screening at this time due to the corona virus outbreak and was advised to call back in 2 to 3 months to see if this service is available at that time. Patient voiced understanding.

## 2020-03-25 NOTE — PROGRESS NOTES
Patient did not finish his questionnaires. He will either have to reschedule his telephone screening or make an appointment to see a provider to schedule his procedure.

## 2020-04-28 ENCOUNTER — ANTI-COAG VISIT (OUTPATIENT)
Dept: INTERNAL MEDICINE CLINIC | Facility: CLINIC | Age: 65
End: 2020-04-28
Payer: COMMERCIAL

## 2020-04-28 DIAGNOSIS — I48.20 CHRONIC ATRIAL FIBRILLATION (HCC): ICD-10-CM

## 2020-04-28 DIAGNOSIS — Z79.01 LONG TERM (CURRENT) USE OF ANTICOAGULANTS: ICD-10-CM

## 2020-04-28 DIAGNOSIS — Z51.81 ENCOUNTER FOR THERAPEUTIC DRUG MONITORING: ICD-10-CM

## 2020-04-28 PROCEDURE — 36416 COLLJ CAPILLARY BLOOD SPEC: CPT

## 2020-04-28 PROCEDURE — 85610 PROTHROMBIN TIME: CPT

## 2020-04-29 ENCOUNTER — TELEPHONE (OUTPATIENT)
Dept: INTERNAL MEDICINE CLINIC | Facility: CLINIC | Age: 65
End: 2020-04-29

## 2020-04-29 DIAGNOSIS — E53.8 B12 DEFICIENCY: Primary | ICD-10-CM

## 2020-04-29 RX ORDER — CYANOCOBALAMIN 1000 UG/ML
1000 INJECTION INTRAMUSCULAR; SUBCUTANEOUS
Status: SHIPPED | OUTPATIENT
Start: 2020-04-29

## 2020-04-29 NOTE — TELEPHONE ENCOUNTER
I have placed an order for pt to have b12 injections monthly, please check if the order was placed correctly and if anything needs to be changed, notify pt that he may start getting the injections again now that order is updated.  thanks

## 2020-04-29 NOTE — TELEPHONE ENCOUNTER
Please order monthly vitamin B12 1000 cc injections for this patient with B12 deficiency and notify the patient that the order has been placed

## 2020-04-29 NOTE — TELEPHONE ENCOUNTER
Order is correct for B12 injections . Call center please contact Pt and schedule a [place] appointment for B12 injection .

## 2020-04-29 NOTE — TELEPHONE ENCOUNTER
Patient seen in [facility] yesterday - reports he has been getting a B12 injection monthly for several months but was told the order is . States original order came from Dr. Jami Funes and that he was told to get a new order from PCP.  States he has tried t

## 2020-04-30 ENCOUNTER — NURSE ONLY (OUTPATIENT)
Dept: INTERNAL MEDICINE CLINIC | Facility: CLINIC | Age: 65
End: 2020-04-30
Payer: COMMERCIAL

## 2020-04-30 DIAGNOSIS — E53.8 VITAMIN B 12 DEFICIENCY: Primary | ICD-10-CM

## 2020-04-30 PROCEDURE — 96372 THER/PROPH/DIAG INJ SC/IM: CPT | Performed by: PHYSICIAN ASSISTANT

## 2020-04-30 RX ADMIN — CYANOCOBALAMIN 1000 MCG: 1000 INJECTION INTRAMUSCULAR; SUBCUTANEOUS at 09:12:00

## 2020-05-23 DIAGNOSIS — R63.2 INCREASED APPETITE: ICD-10-CM

## 2020-05-23 DIAGNOSIS — Z51.81 ENCOUNTER FOR THERAPEUTIC DRUG MONITORING: ICD-10-CM

## 2020-05-23 DIAGNOSIS — R63.5 WEIGHT GAIN: ICD-10-CM

## 2020-05-26 RX ORDER — TOPIRAMATE 25 MG/1
TABLET ORAL
Qty: 180 TABLET | Refills: 0 | OUTPATIENT
Start: 2020-05-26

## 2020-06-09 ENCOUNTER — ANTI-COAG VISIT (OUTPATIENT)
Dept: INTERNAL MEDICINE CLINIC | Facility: CLINIC | Age: 65
End: 2020-06-09
Payer: COMMERCIAL

## 2020-06-09 ENCOUNTER — TELEPHONE (OUTPATIENT)
Dept: INTERNAL MEDICINE CLINIC | Facility: CLINIC | Age: 65
End: 2020-06-09

## 2020-06-09 ENCOUNTER — NURSE ONLY (OUTPATIENT)
Dept: INTERNAL MEDICINE CLINIC | Facility: CLINIC | Age: 65
End: 2020-06-09
Payer: COMMERCIAL

## 2020-06-09 DIAGNOSIS — Z79.01 LONG TERM (CURRENT) USE OF ANTICOAGULANTS: ICD-10-CM

## 2020-06-09 DIAGNOSIS — I48.20 CHRONIC ATRIAL FIBRILLATION (HCC): ICD-10-CM

## 2020-06-09 DIAGNOSIS — Z51.81 ENCOUNTER FOR THERAPEUTIC DRUG MONITORING: ICD-10-CM

## 2020-06-09 DIAGNOSIS — E53.8 VITAMIN B 12 DEFICIENCY: Primary | ICD-10-CM

## 2020-06-09 PROCEDURE — 85610 PROTHROMBIN TIME: CPT

## 2020-06-09 PROCEDURE — 36416 COLLJ CAPILLARY BLOOD SPEC: CPT

## 2020-06-09 PROCEDURE — 96372 THER/PROPH/DIAG INJ SC/IM: CPT | Performed by: PHYSICIAN ASSISTANT

## 2020-06-09 RX ADMIN — CYANOCOBALAMIN 1000 MCG: 1000 INJECTION INTRAMUSCULAR; SUBCUTANEOUS at 10:04:00

## 2020-06-09 NOTE — PROGRESS NOTES
Pt presents for B12 injection . Name and  verified . Order under [facility] from 20 . Pt tolerated injection well .

## 2020-06-09 NOTE — TELEPHONE ENCOUNTER
Today's INR 3.8  Goal 2.0-3.0    Reports eating less greens and veggies the past month. Patient understands per protocol, hold one dose tonight and weekly dose decrease by 25%, actual decrease 6.7%. Repeat INR in 1-2 weeks.  Patient insists to return in

## 2020-06-15 ENCOUNTER — HOSPITAL ENCOUNTER (OUTPATIENT)
Dept: GENERAL RADIOLOGY | Facility: HOSPITAL | Age: 65
Discharge: HOME | End: 2020-06-15
Attending: NURSE PRACTITIONER
Payer: COMMERCIAL

## 2020-06-15 ENCOUNTER — OFFICE VISIT (OUTPATIENT)
Dept: INTERNAL MEDICINE CLINIC | Facility: CLINIC | Age: 65
End: 2020-06-15
Payer: COMMERCIAL

## 2020-06-15 ENCOUNTER — NURSE TRIAGE (OUTPATIENT)
Dept: INTERNAL MEDICINE CLINIC | Facility: CLINIC | Age: 65
End: 2020-06-15

## 2020-06-15 ENCOUNTER — TELEPHONE (OUTPATIENT)
Dept: INTERNAL MEDICINE CLINIC | Facility: CLINIC | Age: 65
End: 2020-06-15

## 2020-06-15 VITALS
HEIGHT: 75 IN | SYSTOLIC BLOOD PRESSURE: 114 MMHG | WEIGHT: 289 LBS | DIASTOLIC BLOOD PRESSURE: 89 MMHG | HEART RATE: 80 BPM | BODY MASS INDEX: 35.93 KG/M2

## 2020-06-15 DIAGNOSIS — I87.2 VENOUS INSUFFICIENCY OF BOTH LOWER EXTREMITIES: ICD-10-CM

## 2020-06-15 DIAGNOSIS — Z79.01 ENCOUNTER FOR MONITORING WARFARIN THERAPY: ICD-10-CM

## 2020-06-15 DIAGNOSIS — M25.562 ACUTE PAIN OF LEFT KNEE: Primary | ICD-10-CM

## 2020-06-15 DIAGNOSIS — Z51.81 ENCOUNTER FOR MONITORING WARFARIN THERAPY: ICD-10-CM

## 2020-06-15 DIAGNOSIS — M25.562 ACUTE PAIN OF LEFT KNEE: ICD-10-CM

## 2020-06-15 PROCEDURE — 99213 OFFICE O/P EST LOW 20 MIN: CPT | Performed by: NURSE PRACTITIONER

## 2020-06-15 PROCEDURE — 99212 OFFICE O/P EST SF 10 MIN: CPT | Performed by: NURSE PRACTITIONER

## 2020-06-15 PROCEDURE — 73562 X-RAY EXAM OF KNEE 3: CPT | Performed by: NURSE PRACTITIONER

## 2020-06-15 RX ORDER — PREDNISONE 10 MG/1
TABLET ORAL
Qty: 30 TABLET | Refills: 0 | Status: ON HOLD | OUTPATIENT
Start: 2020-06-15 | End: 2020-07-02

## 2020-06-15 NOTE — TELEPHONE ENCOUNTER
Action Requested: Summary for Provider     []  Critical Lab, Recommendations Needed  [] Need Additional Advice  []   FYI    []   Need Orders  [] Need Medications Sent to Pharmacy  []  Other     SUMMARY:  Appointment made for today 6/15/20   Instructed need

## 2020-06-15 NOTE — ASSESSMENT & PLAN NOTE
A/P-history of venous insufficiency of lower extremities and his lower extremities are extremely dry.     Plan  1) Patient instructed to apply Aquaphor to his extremities bilaterally and this was written down for him to take to his pharmacy
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
## 2020-06-15 NOTE — TELEPHONE ENCOUNTER
Message left on phone to have a repeat INR test on Thursday due to his last reading of 3.8 and the addition of prednisone.
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
## 2020-06-16 ENCOUNTER — TELEPHONE (OUTPATIENT)
Dept: INTERNAL MEDICINE CLINIC | Facility: CLINIC | Age: 65
End: 2020-06-16

## 2020-06-16 NOTE — TELEPHONE ENCOUNTER
Rose Burgess    Patient called to monitor INR closely while taking the prednisone. Patient last INR was 3.8 and they reduced his coumadin. He will follow with coumadin clinic sooner due to prednisone.     Marisa García, ANP

## 2020-06-25 RX ORDER — AMLODIPINE BESYLATE 5 MG/1
TABLET ORAL
Qty: 90 TABLET | Refills: 1 | Status: ON HOLD | OUTPATIENT
Start: 2020-06-25 | End: 2020-07-02

## 2020-06-25 RX ORDER — ATORVASTATIN CALCIUM 10 MG/1
TABLET, FILM COATED ORAL
Qty: 90 TABLET | Refills: 1 | Status: ON HOLD | OUTPATIENT
Start: 2020-06-25 | End: 2020-07-08

## 2020-06-30 ENCOUNTER — TELEPHONE (OUTPATIENT)
Dept: INTERNAL MEDICINE CLINIC | Facility: CLINIC | Age: 65
End: 2020-06-30

## 2020-06-30 DIAGNOSIS — M25.562 CHRONIC PAIN OF LEFT KNEE: Primary | ICD-10-CM

## 2020-06-30 DIAGNOSIS — G89.29 CHRONIC PAIN OF LEFT KNEE: Primary | ICD-10-CM

## 2020-06-30 NOTE — TELEPHONE ENCOUNTER
Patient calling stating he finished the course of prednisone and left knee pain persists. States it really does not feel any better. Asking what the next course of treatment should be.

## 2020-06-30 NOTE — TELEPHONE ENCOUNTER
Please let patient know I put a referral in the computer to see Dr Ene Madsen, Please make an appointment [phone]    Candi Reagan, ANP

## 2020-07-01 ENCOUNTER — TELEPHONE (OUTPATIENT)
Dept: ORTHOPEDICS CLINIC | Facility: CLINIC | Age: 65
End: 2020-07-01

## 2020-07-01 NOTE — TELEPHONE ENCOUNTER
S/w pt and he states 4 wks ago both knees started to hurt, left knee worse. Pt denies any injury. Pt saw his pcp and took a course of prednisone but it didn't help. Pt denies any swelling, redness, warmth. Pt rates his pain 10/10.  Pt denies taking any othe

## 2020-07-02 ENCOUNTER — APPOINTMENT (OUTPATIENT)
Dept: GENERAL RADIOLOGY | Facility: HOSPITAL | Age: 65
DRG: 565 | End: 2020-07-02
Attending: EMERGENCY MEDICINE
Payer: COMMERCIAL

## 2020-07-02 ENCOUNTER — APPOINTMENT (OUTPATIENT)
Dept: CV DIAGNOSTICS | Facility: HOSPITAL | Age: 65
DRG: 565 | End: 2020-07-02
Attending: INTERNAL MEDICINE
Payer: COMMERCIAL

## 2020-07-02 ENCOUNTER — HOSPITAL ENCOUNTER (INPATIENT)
Facility: HOSPITAL | Age: 65
LOS: 11 days | Discharge: NURSING, REHAB OR LONG-TERM CARE | DRG: 565 | End: 2020-07-13
Attending: EMERGENCY MEDICINE | Admitting: HOSPITALIST
Payer: COMMERCIAL

## 2020-07-02 ENCOUNTER — APPOINTMENT (OUTPATIENT)
Dept: MRI IMAGING | Facility: HOSPITAL | Age: 65
DRG: 565 | End: 2020-07-02
Attending: EMERGENCY MEDICINE
Payer: COMMERCIAL

## 2020-07-02 ENCOUNTER — APPOINTMENT (OUTPATIENT)
Dept: MRI IMAGING | Facility: HOSPITAL | Age: 65
DRG: 565 | End: 2020-07-02
Attending: HOSPITALIST
Payer: COMMERCIAL

## 2020-07-02 DIAGNOSIS — M25.562 CHRONIC PAIN OF LEFT KNEE: ICD-10-CM

## 2020-07-02 DIAGNOSIS — M24.811 INTERNAL DERANGEMENT OF RIGHT SHOULDER: ICD-10-CM

## 2020-07-02 DIAGNOSIS — R26.2 INABILITY TO AMBULATE DUE TO KNEE: ICD-10-CM

## 2020-07-02 DIAGNOSIS — G89.29 CHRONIC PAIN OF LEFT KNEE: ICD-10-CM

## 2020-07-02 DIAGNOSIS — G72.9 MYOPATHY: Primary | ICD-10-CM

## 2020-07-02 PROBLEM — S76.302A HAMSTRING INJURY, LEFT, INITIAL ENCOUNTER: Status: ACTIVE | Noted: 2020-07-02

## 2020-07-02 LAB
ANION GAP SERPL CALC-SCNC: 4 MMOL/L (ref 0–18)
BACTERIA UR QL AUTO: NEGATIVE /HPF
BASOPHILS # BLD AUTO: 0.02 X10(3) UL (ref 0–0.2)
BASOPHILS NFR BLD AUTO: 0.2 %
BILIRUB UR QL: NEGATIVE
BUN BLD-MCNC: 15 MG/DL (ref 7–18)
BUN/CREAT SERPL: 14 (ref 10–20)
CALCIUM BLD-MCNC: 9 MG/DL (ref 8.5–10.1)
CHLORIDE SERPL-SCNC: 109 MMOL/L (ref 98–112)
CLARITY UR: CLEAR
CO2 SERPL-SCNC: 27 MMOL/L (ref 21–32)
COLOR UR: YELLOW
CREAT BLD-MCNC: 1.07 MG/DL (ref 0.7–1.3)
DEPRECATED RDW RBC AUTO: 47 FL (ref 35.1–46.3)
EOSINOPHIL # BLD AUTO: 0 X10(3) UL (ref 0–0.7)
EOSINOPHIL NFR BLD AUTO: 0 %
ERYTHROCYTE [DISTWIDTH] IN BLOOD BY AUTOMATED COUNT: 13.8 % (ref 11–15)
GLUCOSE BLD-MCNC: 99 MG/DL (ref 70–99)
GLUCOSE UR-MCNC: NEGATIVE MG/DL
HCT VFR BLD AUTO: 45.6 % (ref 39–53)
HGB BLD-MCNC: 15 G/DL (ref 13–17.5)
HGB UR QL STRIP.AUTO: NEGATIVE
IMM GRANULOCYTES # BLD AUTO: 0.05 X10(3) UL (ref 0–1)
IMM GRANULOCYTES NFR BLD: 0.5 %
INR BLD: 1.88 (ref 0.9–1.2)
KETONES UR-MCNC: NEGATIVE MG/DL
LEUKOCYTE ESTERASE UR QL STRIP.AUTO: NEGATIVE
LYMPHOCYTES # BLD AUTO: 1.17 X10(3) UL (ref 1–4)
LYMPHOCYTES NFR BLD AUTO: 11.3 %
MCH RBC QN AUTO: 30.4 PG (ref 26–34)
MCHC RBC AUTO-ENTMCNC: 32.9 G/DL (ref 31–37)
MCV RBC AUTO: 92.5 FL (ref 80–100)
MONOCYTES # BLD AUTO: 1.02 X10(3) UL (ref 0.1–1)
MONOCYTES NFR BLD AUTO: 9.9 %
NEUTROPHILS # BLD AUTO: 8.05 X10 (3) UL (ref 1.5–7.7)
NEUTROPHILS # BLD AUTO: 8.05 X10(3) UL (ref 1.5–7.7)
NEUTROPHILS NFR BLD AUTO: 78.1 %
NITRITE UR QL STRIP.AUTO: NEGATIVE
OSMOLALITY SERPL CALC.SUM OF ELEC: 291 MOSM/KG (ref 275–295)
PH UR: 6 [PH] (ref 5–8)
PLATELET # BLD AUTO: 189 10(3)UL (ref 150–450)
POTASSIUM SERPL-SCNC: 4.1 MMOL/L (ref 3.5–5.1)
PROT UR-MCNC: 30 MG/DL
PROTHROMBIN TIME: 21.3 SECONDS (ref 11.8–14.5)
RBC # BLD AUTO: 4.93 X10(6)UL (ref 3.8–5.8)
RBC #/AREA URNS AUTO: <1 /HPF
SARS-COV-2 RNA RESP QL NAA+PROBE: NOT DETECTED
SODIUM SERPL-SCNC: 140 MMOL/L (ref 136–145)
SP GR UR STRIP: 1.02 (ref 1–1.03)
UROBILINOGEN UR STRIP-ACNC: 4
VIT B12 SERPL-MCNC: 411 PG/ML (ref 193–986)
WBC # BLD AUTO: 10.3 X10(3) UL (ref 4–11)
WBC #/AREA URNS AUTO: 1 /HPF

## 2020-07-02 PROCEDURE — 5A09357 ASSISTANCE WITH RESPIRATORY VENTILATION, LESS THAN 24 CONSECUTIVE HOURS, CONTINUOUS POSITIVE AIRWAY PRESSURE: ICD-10-PCS | Performed by: HOSPITALIST

## 2020-07-02 PROCEDURE — 73030 X-RAY EXAM OF SHOULDER: CPT | Performed by: EMERGENCY MEDICINE

## 2020-07-02 PROCEDURE — 73560 X-RAY EXAM OF KNEE 1 OR 2: CPT | Performed by: EMERGENCY MEDICINE

## 2020-07-02 PROCEDURE — 72148 MRI LUMBAR SPINE W/O DYE: CPT | Performed by: EMERGENCY MEDICINE

## 2020-07-02 PROCEDURE — 73721 MRI JNT OF LWR EXTRE W/O DYE: CPT | Performed by: HOSPITALIST

## 2020-07-02 PROCEDURE — 99223 1ST HOSP IP/OBS HIGH 75: CPT | Performed by: HOSPITALIST

## 2020-07-02 PROCEDURE — 72146 MRI CHEST SPINE W/O DYE: CPT | Performed by: EMERGENCY MEDICINE

## 2020-07-02 RX ORDER — LISINOPRIL 20 MG/1
20 TABLET ORAL DAILY
Status: ON HOLD | COMMUNITY
End: 2020-08-03

## 2020-07-02 RX ORDER — MORPHINE SULFATE 4 MG/ML
4 INJECTION, SOLUTION INTRAMUSCULAR; INTRAVENOUS EVERY 2 HOUR PRN
Status: DISCONTINUED | OUTPATIENT
Start: 2020-07-02 | End: 2020-07-13

## 2020-07-02 RX ORDER — HYDROCODONE BITARTRATE AND ACETAMINOPHEN 5; 325 MG/1; MG/1
1 TABLET ORAL EVERY 4 HOURS PRN
Status: DISCONTINUED | OUTPATIENT
Start: 2020-07-02 | End: 2020-07-13

## 2020-07-02 RX ORDER — ONDANSETRON 2 MG/ML
4 INJECTION INTRAMUSCULAR; INTRAVENOUS EVERY 6 HOURS PRN
Status: DISCONTINUED | OUTPATIENT
Start: 2020-07-02 | End: 2020-07-13

## 2020-07-02 RX ORDER — MORPHINE SULFATE 2 MG/ML
2 INJECTION, SOLUTION INTRAMUSCULAR; INTRAVENOUS EVERY 2 HOUR PRN
Status: DISCONTINUED | OUTPATIENT
Start: 2020-07-02 | End: 2020-07-13

## 2020-07-02 RX ORDER — CARVEDILOL 25 MG/1
25 TABLET ORAL 2 TIMES DAILY WITH MEALS
Status: DISCONTINUED | OUTPATIENT
Start: 2020-07-02 | End: 2020-07-13

## 2020-07-02 RX ORDER — LISINOPRIL 20 MG/1
20 TABLET ORAL DAILY
Status: DISCONTINUED | OUTPATIENT
Start: 2020-07-03 | End: 2020-07-13

## 2020-07-02 RX ORDER — SODIUM CHLORIDE 0.9 % (FLUSH) 0.9 %
3 SYRINGE (ML) INJECTION AS NEEDED
Status: DISCONTINUED | OUTPATIENT
Start: 2020-07-02 | End: 2020-07-13

## 2020-07-02 RX ORDER — ATORVASTATIN CALCIUM 10 MG/1
10 TABLET, FILM COATED ORAL NIGHTLY
Status: DISCONTINUED | OUTPATIENT
Start: 2020-07-02 | End: 2020-07-08

## 2020-07-02 RX ORDER — ASPIRIN 81 MG/1
81 TABLET ORAL DAILY
Status: DISCONTINUED | OUTPATIENT
Start: 2020-07-02 | End: 2020-07-13

## 2020-07-02 RX ORDER — HYDROCODONE BITARTRATE AND ACETAMINOPHEN 5; 325 MG/1; MG/1
2 TABLET ORAL EVERY 4 HOURS PRN
Status: DISCONTINUED | OUTPATIENT
Start: 2020-07-02 | End: 2020-07-13

## 2020-07-02 RX ORDER — METOCLOPRAMIDE HYDROCHLORIDE 5 MG/ML
10 INJECTION INTRAMUSCULAR; INTRAVENOUS EVERY 8 HOURS PRN
Status: DISCONTINUED | OUTPATIENT
Start: 2020-07-02 | End: 2020-07-13

## 2020-07-02 RX ORDER — WARFARIN SODIUM 5 MG/1
5 TABLET ORAL DAILY
Status: DISCONTINUED | OUTPATIENT
Start: 2020-07-02 | End: 2020-07-05

## 2020-07-02 RX ORDER — MORPHINE SULFATE 2 MG/ML
1 INJECTION, SOLUTION INTRAMUSCULAR; INTRAVENOUS EVERY 2 HOUR PRN
Status: DISCONTINUED | OUTPATIENT
Start: 2020-07-02 | End: 2020-07-13

## 2020-07-02 RX ORDER — ACETAMINOPHEN 325 MG/1
650 TABLET ORAL EVERY 4 HOURS PRN
Status: DISCONTINUED | OUTPATIENT
Start: 2020-07-02 | End: 2020-07-13

## 2020-07-02 NOTE — ED PROVIDER NOTES
Patient Seen in: [facility] Emergency Department      History   Patient presents with:  Lower Extremity Injury    Stated Complaint: left leg pain    HPI    The patient is a 79-year-old male who developed left knee pain after helping his daughter T • OTHER SURGICAL HISTORY  1993    Bilateral vein stripping   • TONSILLECTOMY     • UMBILICAL HERNIA REPAIR  2000   • WRIST FRACTURE SURGERY      open reduction internal fixation (Bilateral wrist fractures)                    Social History    Tobacco Use Palpations: Abdomen is soft. There is no mass. Tenderness: There is no tenderness. There is no guarding or rebound. Musculoskeletal:      Right shoulder: He exhibits decreased range of motion (Unable to abduct) and tenderness (Anterior lateral). The following orders were created for panel order CBC WITH DIFFERENTIAL WITH PLATELET.   Procedure                               Abnormality         Status                     ---------                               -----------         ------ Mri Spine Lumbar (cpt=72148)    Result Date: 7/2/2020  CONCLUSION:   1. L5-S1:  Moderate right and mild left neural foraminal stenosis. 2. L4-L5:  Mild right neural foraminal and minimal bilateral lateral recess stenosis. 3. Mild lumbar dextroscoliosis.

## 2020-07-02 NOTE — H&P
[facility]    PATIENT'S NAME: Maggie Castaneda   ATTENDING PHYSICIAN: Milind Worrell MD   PATIENT ACCOUNT#:   [id number]    LOCATION:  [address]  MEDICAL RECORD #:   [id number]       YOB: 1955  ADMISSION DATE:       07/02/20 Ex-tobacco user. No current tobacco, alcohol or drug use. Lives with his family. He has a history of alcohol abuse but not anymore per his report.      REVIEW OF SYSTEMS:  The patient reports that he helped his daughter with some house chores around a we fall precautions, orthopedic surgery consult, Dr. Ben Darling and cardiology consult in case the patient will need a clearance for an operation, as mentioned his cardiac history. Further recommendations to follow.     Dictated By Sarah Zavala MD  d: 07/02/

## 2020-07-02 NOTE — PROGRESS NOTES
ASSESSMENT/PLAN:     Impression: 1. Inability to walk due to trauma. 2.  Chronic atrial fibrillation. His rate is controlled he is on appropriate medications. 3.  History of a cardiomyopathy. His last ejection fraction was 30%.   He does not have an tablet 1   • Acetaminophen-Codeine (TYLENOL WITH CODEINE #3) 300-30 MG Oral Tab Take 1 tablet by mouth every 4 (four) hours as needed for Pain.  42 tablet 0   • WARFARIN SODIUM 5 MG Oral Tab TAKE AS DIRECTED BY  ANTICOAGULATION CLINIC: 1  AND 1/2 TABLETS BY :denies hematuria. INTEG:no new rashes. MS:no limiting arthritis. NEURO:no localized deficits. HEME/LYMPH:denies easy bruising. ALL:no new allergies. ROS otherwise negative except as in HPI.   EXAM:   /83   Pulse 98   Temp 98.6 °F (37 °C)   Resp 18

## 2020-07-02 NOTE — PLAN OF CARE
Problem: Patient Centered Care  Goal: Patient preferences are identified and integrated in the patient's plan of care  Description  Interventions:  - What would you like us to know as we care for you?   - Provide timely, complete, and accurate informatio adequate protection for wounds/incisions during mobilization  - Obtain PT/OT consults as needed  - Advance activity as appropriate  - Communicate ordered activity level and limitations with patient/family  Outcome: Progressing  Goal: Maintain proper alignm admitted from home lives with wife due to left knee pain, patient is unable to walk due to pain that has worsen.    2DEcho scheduled for tomorrow 7/3  EKG done today  MRI scheduled, screen questionnaire done   Lab draw   Pain management (Left knee, and Righ

## 2020-07-02 NOTE — ED NOTES
Orders for admission, patient is aware of plan and ready to go upstairs.  Any questions, please call ED RN Mariam Glance  at extension [phone]

## 2020-07-03 ENCOUNTER — APPOINTMENT (OUTPATIENT)
Dept: CV DIAGNOSTICS | Facility: HOSPITAL | Age: 65
DRG: 565 | End: 2020-07-03
Attending: INTERNAL MEDICINE
Payer: COMMERCIAL

## 2020-07-03 ENCOUNTER — APPOINTMENT (OUTPATIENT)
Dept: MRI IMAGING | Facility: HOSPITAL | Age: 65
DRG: 565 | End: 2020-07-03
Attending: Other
Payer: COMMERCIAL

## 2020-07-03 LAB
ANION GAP SERPL CALC-SCNC: 6 MMOL/L (ref 0–18)
B BURGDOR IGG+IGM SER QL: NEGATIVE
BASOPHILS # BLD AUTO: 0.03 X10(3) UL (ref 0–0.2)
BASOPHILS NFR BLD AUTO: 0.4 %
BASOPHILS NFR FLD: 0 %
BUN BLD-MCNC: 16 MG/DL (ref 7–18)
BUN/CREAT SERPL: 17.4 (ref 10–20)
CALCIUM BLD-MCNC: 8.5 MG/DL (ref 8.5–10.1)
CCP IGG SERPL-ACNC: 1.2 U/ML (ref 0–6.9)
CHLORIDE SERPL-SCNC: 109 MMOL/L (ref 98–112)
CK SERPL-CCNC: 26 U/L (ref 39–308)
CO2 SERPL-SCNC: 26 MMOL/L (ref 21–32)
COLOR FLD: YELLOW
CREAT BLD-MCNC: 0.92 MG/DL (ref 0.7–1.3)
CRP SERPL-MCNC: 15.1 MG/DL (ref ?–0.3)
DEPRECATED RDW RBC AUTO: 46.5 FL (ref 35.1–46.3)
EOSINOPHIL # BLD AUTO: 0.05 X10(3) UL (ref 0–0.7)
EOSINOPHIL NFR BLD AUTO: 0.6 %
EOSINOPHIL NFR FLD: 0 %
ERYTHROCYTE [DISTWIDTH] IN BLOOD BY AUTOMATED COUNT: 13.7 % (ref 11–15)
ERYTHROCYTE [SEDIMENTATION RATE] IN BLOOD: 48 MM/HR (ref 0–20)
GLUCOSE BLD-MCNC: 109 MG/DL (ref 70–99)
HCT VFR BLD AUTO: 42.6 % (ref 39–53)
HGB BLD-MCNC: 13.8 G/DL (ref 13–17.5)
IMM GRANULOCYTES # BLD AUTO: 0.03 X10(3) UL (ref 0–1)
IMM GRANULOCYTES NFR BLD: 0.4 %
INR BLD: 2.08 (ref 0.9–1.2)
LYMPHOCYTES # BLD AUTO: 1.4 X10(3) UL (ref 1–4)
LYMPHOCYTES NFR BLD AUTO: 17.4 %
LYMPHOCYTES NFR FLD: 13 %
MCH RBC QN AUTO: 29.9 PG (ref 26–34)
MCHC RBC AUTO-ENTMCNC: 32.4 G/DL (ref 31–37)
MCV RBC AUTO: 92.4 FL (ref 80–100)
MONOCYTES # BLD AUTO: 0.84 X10(3) UL (ref 0.1–1)
MONOCYTES NFR BLD AUTO: 10.4 %
MONOCYTES NFR FLD: 31 %
NEUTROPHILS # BLD AUTO: 5.69 X10 (3) UL (ref 1.5–7.7)
NEUTROPHILS # BLD AUTO: 5.69 X10(3) UL (ref 1.5–7.7)
NEUTROPHILS NFR BLD AUTO: 70.8 %
NEUTROPHILS NFR FLD: 56 %
OSMOLALITY SERPL CALC.SUM OF ELEC: 294 MOSM/KG (ref 275–295)
PLATELET # BLD AUTO: 157 10(3)UL (ref 150–450)
POTASSIUM SERPL-SCNC: 3.9 MMOL/L (ref 3.5–5.1)
PROTHROMBIN TIME: 23 SECONDS (ref 11.8–14.5)
RBC # BLD AUTO: 4.61 X10(6)UL (ref 3.8–5.8)
RBC # FLD: 95 /CUMM (ref ?–1)
RHEUMATOID FACT SERPL-ACNC: <10 IU/ML (ref ?–15)
SODIUM SERPL-SCNC: 141 MMOL/L (ref 136–145)
T PALLIDUM AB SER QL: NEGATIVE
WBC # BLD AUTO: 8 X10(3) UL (ref 4–11)
WBC # FLD: 725 /CUMM

## 2020-07-03 PROCEDURE — 93306 TTE W/DOPPLER COMPLETE: CPT | Performed by: INTERNAL MEDICINE

## 2020-07-03 PROCEDURE — 3008F BODY MASS INDEX DOCD: CPT | Performed by: HOSPITALIST

## 2020-07-03 PROCEDURE — 3078F DIAST BP <80 MM HG: CPT | Performed by: HOSPITALIST

## 2020-07-03 PROCEDURE — 72156 MRI NECK SPINE W/O & W/DYE: CPT | Performed by: OTHER

## 2020-07-03 PROCEDURE — 99233 SBSQ HOSP IP/OBS HIGH 50: CPT | Performed by: HOSPITALIST

## 2020-07-03 PROCEDURE — 99222 1ST HOSP IP/OBS MODERATE 55: CPT | Performed by: OTHER

## 2020-07-03 PROCEDURE — 3074F SYST BP LT 130 MM HG: CPT | Performed by: HOSPITALIST

## 2020-07-03 RX ORDER — LORAZEPAM 1 MG/1
1 TABLET ORAL ONCE
Status: COMPLETED | OUTPATIENT
Start: 2020-07-03 | End: 2020-07-03

## 2020-07-03 RX ORDER — LORAZEPAM 2 MG/ML
1 INJECTION INTRAMUSCULAR ONCE
Status: DISCONTINUED | OUTPATIENT
Start: 2020-07-03 | End: 2020-07-13

## 2020-07-03 NOTE — CONSULTS
Pt seen and examined. Full consult dictated. Impression:  Bilateral LE proximal muscle weakness, left knee effusion. Will aspirate L knee for fluid analysis. Recommend neurology consultation.

## 2020-07-03 NOTE — PLAN OF CARE
Pt denies sob, chills ,ever, N/V. Pain controlled with medication. VS monitored. Medication reviewed. Call light in reach. Safety maintained. We will continue to monitor.    Problem: Patient/Family Goals  Goal: Patient/Family Long Term Goal  Description  Pa arrhythmias or at baseline  Description  INTERVENTIONS:  - Continuous cardiac monitoring, monitor vital signs, obtain 12 lead EKG if indicated  - Evaluate effectiveness of antiarrhythmic and heart rate control medications as ordered  - Initiate emergency m increased pain with activity and pre-medicate as appropriate  Outcome: Progressing     Problem: SAFETY ADULT - FALL  Goal: Free from fall injury  Description  INTERVENTIONS:  - Assess pt frequently for physical needs  - Identify cognitive and physical defi cardiac output and hemodynamic stability  Description  INTERVENTIONS:  - Monitor vital signs, rhythm, and trends  - Monitor for bleeding, hypotension and signs of decreased cardiac output  - Evaluate effectiveness of vasoactive medications to optimize hemo body alignment per provider's orders  - Instruct and reinforce with patient and family use of appropriate assistive device and precautions (e.g. spinal or hip dislocation precautions)  7/3/2020 0355 by Elissa Arnold RN  Outcome: Progressing  7/3/2020 03

## 2020-07-03 NOTE — CONSULTS
Neurology Inpatient Consult Note    Helena Flores : 3/22/1955   Referring Physician: Dr. Fani Quiles  HPI:     Helena Flores is a 76year old male who is being seen in neurologic evaluation. Patient being seen in evaluation for weakness.   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 Dr. Bronwyn Marcano to assume care this afternoon. Please page with any questions / concerns.     Luis A Maya MD  [place] [phone]      For our mutual protection, during entire interaction, mask, gloves, eye goggles were worn
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
## 2020-07-03 NOTE — PLAN OF CARE
Problem: Patient/Family Goals  Goal: Patient/Family Long Term Goal  Description  Patient's Long Term Goal:     Interventions:     Outcome: Progressing  Goal: Patient/Family Short Term Goal  Description  Patient's Short Term Goal:     Interventions: therapy as ordered  Outcome: Progressing     Problem: MUSCULOSKELETAL - ADULT  Goal: Return mobility to safest level of function  Description  INTERVENTIONS:  - Assess patient stability and activity tolerance for standing, transferring and ambulating w/ or assessment.  - Educate pt/family on patient safety including physical limitations  - Instruct pt to call for assistance with activity based on assessment  - Modify environment to reduce risk of injury  - Provide assistive devices as appropriate  - Consider

## 2020-07-03 NOTE — PROGRESS NOTES
[facility] Cardiology Progress Note      Orvis Rule Patient Status:  Inpatient    3/22/1955 MRN [id number]   Location [facility] 5SW/SE Attending Roosvelt Habermann, MD   Hosp Day # 1 PCP Martha Jaimes MD     Subjective:    Shanta Lara 1.07  --  0.92   GLU 99  --  109*       Recent Labs   Lab 07/02/20  1926 07/03/20  0614   INR 1.88* 2.08*       No results for input(s): TROP in the last 168 hours.     TSH   Date Value Ref Range Status   11/25/2019 1.810 0.358 - 3.740 mIU/mL Final     Comm

## 2020-07-03 NOTE — CONSULTS
[facility]    PATIENT'S NAME: Whitney WALTON   ATTENDING PHYSICIAN: Roxana Ponce MD   CONSULTING PHYSICIAN: Ralph Calderon MD   PATIENT ACCOUNT#:   [id number]    LOCATION:  [address] [place] [address] #:   [id number]       DATE OF BIR nausea, vomiting, night sweats, fevers, chills, or unexplained weight loss. PHYSICAL EXAMINATION:    GENERAL:  Well-nourished male in no distress. He is resting comfortably in the bed.     EXTREMITIES:  Examination of the lower extremities reveals the abdominal aortic aneurysm of 3.4 cm. MRI of the left knee was reviewed; it shows some signal abnormality within the quadriceps tendon but no discrete tear. There is a large knee effusion and degenerative changes noted.   No fracture or significant bone

## 2020-07-04 ENCOUNTER — APPOINTMENT (OUTPATIENT)
Dept: CT IMAGING | Facility: HOSPITAL | Age: 65
DRG: 565 | End: 2020-07-04
Attending: HOSPITALIST
Payer: COMMERCIAL

## 2020-07-04 LAB
ANION GAP SERPL CALC-SCNC: 6 MMOL/L (ref 0–18)
BASOPHILS # BLD AUTO: 0.02 X10(3) UL (ref 0–0.2)
BASOPHILS NFR BLD AUTO: 0.2 %
BUN BLD-MCNC: 17 MG/DL (ref 7–18)
BUN/CREAT SERPL: 16.5 (ref 10–20)
CALCIUM BLD-MCNC: 9 MG/DL (ref 8.5–10.1)
CHLORIDE SERPL-SCNC: 106 MMOL/L (ref 98–112)
CO2 SERPL-SCNC: 28 MMOL/L (ref 21–32)
CREAT BLD-MCNC: 1.03 MG/DL (ref 0.7–1.3)
DEPRECATED RDW RBC AUTO: 46.2 FL (ref 35.1–46.3)
EOSINOPHIL # BLD AUTO: 0.05 X10(3) UL (ref 0–0.7)
EOSINOPHIL NFR BLD AUTO: 0.6 %
ERYTHROCYTE [DISTWIDTH] IN BLOOD BY AUTOMATED COUNT: 13.6 % (ref 11–15)
GLUCOSE BLD-MCNC: 99 MG/DL (ref 70–99)
HCT VFR BLD AUTO: 44.3 % (ref 39–53)
HGB BLD-MCNC: 14.2 G/DL (ref 13–17.5)
IMM GRANULOCYTES # BLD AUTO: 0.03 X10(3) UL (ref 0–1)
IMM GRANULOCYTES NFR BLD: 0.4 %
INR BLD: 2.56 (ref 0.9–1.2)
LYMPHOCYTES # BLD AUTO: 1.5 X10(3) UL (ref 1–4)
LYMPHOCYTES NFR BLD AUTO: 18.7 %
MCH RBC QN AUTO: 29.7 PG (ref 26–34)
MCHC RBC AUTO-ENTMCNC: 32.1 G/DL (ref 31–37)
MCV RBC AUTO: 92.7 FL (ref 80–100)
MONOCYTES # BLD AUTO: 0.78 X10(3) UL (ref 0.1–1)
MONOCYTES NFR BLD AUTO: 9.7 %
NEUTROPHILS # BLD AUTO: 5.65 X10 (3) UL (ref 1.5–7.7)
NEUTROPHILS # BLD AUTO: 5.65 X10(3) UL (ref 1.5–7.7)
NEUTROPHILS NFR BLD AUTO: 70.4 %
OSMOLALITY SERPL CALC.SUM OF ELEC: 292 MOSM/KG (ref 275–295)
PLATELET # BLD AUTO: 182 10(3)UL (ref 150–450)
POTASSIUM SERPL-SCNC: 4.2 MMOL/L (ref 3.5–5.1)
PROTHROMBIN TIME: 27.1 SECONDS (ref 11.8–14.5)
RBC # BLD AUTO: 4.78 X10(6)UL (ref 3.8–5.8)
SODIUM SERPL-SCNC: 140 MMOL/L (ref 136–145)
WBC # BLD AUTO: 8 X10(3) UL (ref 4–11)

## 2020-07-04 PROCEDURE — 3078F DIAST BP <80 MM HG: CPT | Performed by: HOSPITALIST

## 2020-07-04 PROCEDURE — 73700 CT LOWER EXTREMITY W/O DYE: CPT | Performed by: HOSPITALIST

## 2020-07-04 PROCEDURE — 72192 CT PELVIS W/O DYE: CPT | Performed by: HOSPITALIST

## 2020-07-04 PROCEDURE — 3008F BODY MASS INDEX DOCD: CPT | Performed by: HOSPITALIST

## 2020-07-04 PROCEDURE — 0S9D3ZX DRAINAGE OF LEFT KNEE JOINT, PERCUTANEOUS APPROACH, DIAGNOSTIC: ICD-10-PCS | Performed by: ORTHOPAEDIC SURGERY

## 2020-07-04 PROCEDURE — 3074F SYST BP LT 130 MM HG: CPT | Performed by: HOSPITALIST

## 2020-07-04 PROCEDURE — 99233 SBSQ HOSP IP/OBS HIGH 50: CPT | Performed by: HOSPITALIST

## 2020-07-04 RX ORDER — POLYETHYLENE GLYCOL 3350 17 G/17G
17 POWDER, FOR SOLUTION ORAL DAILY
Status: DISCONTINUED | OUTPATIENT
Start: 2020-07-04 | End: 2020-07-13

## 2020-07-04 RX ORDER — DIGOXIN 125 MCG
125 TABLET ORAL DAILY
Status: DISCONTINUED | OUTPATIENT
Start: 2020-07-04 | End: 2020-07-13

## 2020-07-04 RX ORDER — DOCUSATE SODIUM 100 MG/1
100 CAPSULE, LIQUID FILLED ORAL 2 TIMES DAILY
Status: DISCONTINUED | OUTPATIENT
Start: 2020-07-04 | End: 2020-07-13

## 2020-07-04 RX ORDER — BISACODYL 10 MG
10 SUPPOSITORY, RECTAL RECTAL
Status: DISCONTINUED | OUTPATIENT
Start: 2020-07-04 | End: 2020-07-13

## 2020-07-04 NOTE — PROGRESS NOTES
Asked by Dr. Josefina Weiner to follow-up regarding MRI of the cervical spine. MRI of the cervical spine report reviewed. Patient is down in radiology for several CT scans. Neurology consult reviewed.   Additional blood tests ordered by neurology, Lyme serology a

## 2020-07-04 NOTE — PROGRESS NOTES
[facility] Card[facility] Progress Note      Banco Leader Patient Status:  Inpatient    3/22/1955 MRN [id number]   Location [facility] 5SW/SE Attending Patito Mckeon MD   Hosp Day # 2 PCP Guevara Blandon MD     Subjective:     Of 07/04/20  0741     --  141 140   K  --  4.1 3.9 4.2     --  109 106   CO2 27.0  --  26.0 28.0   BUN 15  --  16 17   CREATSERUM 1.07  --  0.92 1.03   GLU 99  --  109* 99       Recent Labs   Lab 07/02/20  1926 07/03/20  0614 07/04/20  0741   INR

## 2020-07-04 NOTE — OCCUPATIONAL THERAPY NOTE
Per Ortho - no surgical indications at this time; discussed with Dr. Ke Varela- patient is ok for OOB with protective/WBAT on LLE.

## 2020-07-04 NOTE — PLAN OF CARE
Pt denies sob, chills, fever, sob,n/v. Pain controled with medication. Medication reviewed. VS monitored. Safety maintained. Call light in reach. We will continue to monitor.   Problem: Patient/Family Goals  Goal: Patient/Family Long Term Goal  Description Initiate emergency measures for life threatening arrhythmias  - Monitor electrolytes and administer replacement therapy as ordered  Outcome: Progressing     Problem: MUSCULOSKELETAL - ADULT  Goal: Return mobility to safest level of function  Description  I and physical deficits and behaviors that affect risk of falls.   - [place] fall precautions as indicated by assessment.  - Educate pt/family on patient safety including physical limitations  - Instruct pt to call for assistance with activity based on asse

## 2020-07-04 NOTE — PROGRESS NOTES
[facility]  Hospitalist Progress Note     Niki Hanson Patient Status:  Inpatient    3/22/1955  76year old [facility] [id number]   Location 552/552-A Attending Devon Walker MD   Hosp Day # 2 PCP Helane Lundborg, MD     ASSESSMENT/PLAN No distress. HEENT: NCAT, neck supple, no carotid bruit. CV: RRR, S1S2, and intact distal pulses. No gallop, rub, murmur. Pulm: Effort and breath sounds normal. No distress, wheezes, rales, rhonchi.   Abd: Soft, NTND, BS normal, no mass, no HSM, no rebo pt/family and d/w consultants and staff.   Most the time spent discussing the above plan with the patient, wife, Dr. Julius Hernandes

## 2020-07-04 NOTE — PROGRESS NOTES
PHYSICAL THERAPY EVALUATION - INPATIENT     Room Number: 552/552-A  Evaluation Date: 7/4/2020  Type of Evaluation: initial   Physician Order: PT Eval and Treat    Presenting Problem: Pt admitted t ER w/ progressive weakness, LT knee pain and inabilit to w maribel, joint mobility and pain, which are below the patient's pre-admission status. The patient's Approx Degree of Impairment: 92.36% has been calculated based on documentation in the [facility] '6 clicks' Inpatient Basic Mobility Short Form.   Research supports Pecola Knows repair 2005   • Pre-diabetes    • Pre-diabetes    • Umbilical hernia 1295    repair   • Varicose veins 2013    Bilateral vein stripping 1993   • Venous stasis ulcer ([facility] [address] 2013    vein clinics   • Vitamin D deficiency    • Wr shoulder, generalized pain  Management Techniques: Repositioning;Relaxation;Breathing techniques; Activity promotion(ice pac,)        BALANCE  Static Sitting: Not tested  Dynamic Sitting: Not tested  Static Standing: Not tested  Dynamic Standing: Not tested feet with assist device:w/ RW w/ min a.   Goal #3   Current Status    Goal #4 Patient will negotiate 2 stairs/one curb w/ assistive device and min a   Goal #4   Current Status    Goal #5 Patient to demonstrate independence with home activity/exercise instr

## 2020-07-04 NOTE — PLAN OF CARE
Problem: Patient/Family Goals  Goal: Patient/Family Long Term Goal  Description  Patient's Long Term Goal:     Interventions:     Outcome: Progressing  Goal: Patient/Family Short Term Goal  Description  Patient's Short Term Goal:     Interventions: to safest level of function  Description  INTERVENTIONS:  - Assess patient stability and activity tolerance for standing, transferring and ambulating w/ or w/o assistive devices  - Assist with transfers and ambulation using safe patient handling equipment for assistance with activity based on assessment  - Modify environment to reduce risk of injury  - Provide assistive devices as appropriate  - Consider OT/PT consult to assist with strengthening/mobility  - Encourage toileting schedule  Outcome: Progressin

## 2020-07-04 NOTE — DIETARY NOTE
Dietitian Note:     Patient identified at risk by screening criteria for weight loss. Met with patient this morning to discuss weight loss. Patient reports he has been trying to lose weight. He has been \"cutting back on everything\" per patient.  Decreased

## 2020-07-04 NOTE — CONSULTS
Left knee fluid aspirate c/w degenerative OA flare. No findings to suggest infection, crystal induced arthropathy, inflammatory arthritis. Serologic tests negative for autoimmune/inflammatory conditions thus far. Appreciate neurology input.   Consider pe

## 2020-07-05 LAB
C3 SERPL-MCNC: 153 MG/DL (ref 90–180)
C4 SERPL-MCNC: 31.5 MG/DL (ref 10–40)
HAV IGM SER QL: 2.2 MG/DL (ref 1.6–2.6)
INR BLD: 2.92 (ref 0.9–1.2)
PROTHROMBIN TIME: 30 SECONDS (ref 11.8–14.5)
TSI SER-ACNC: 1.64 MIU/ML (ref 0.36–3.74)

## 2020-07-05 PROCEDURE — 99223 1ST HOSP IP/OBS HIGH 75: CPT | Performed by: INTERNAL MEDICINE

## 2020-07-05 PROCEDURE — 99233 SBSQ HOSP IP/OBS HIGH 50: CPT | Performed by: HOSPITALIST

## 2020-07-05 RX ORDER — WARFARIN SODIUM 5 MG/1
5 TABLET ORAL NIGHTLY
Status: DISCONTINUED | OUTPATIENT
Start: 2020-07-06 | End: 2020-07-06

## 2020-07-05 NOTE — PLAN OF CARE
Problem: CARDIOVASCULAR - ADULT  Goal: Maintains optimal cardiac output and hemodynamic stability  Description  INTERVENTIONS:  - Monitor vital signs, rhythm, and trends  - Monitor for bleeding, hypotension and signs of decreased cardiac output  Aj Bryan ADULT  Goal: Verbalizes/displays adequate comfort level or patient's stated pain goal  Description  INTERVENTIONS:  - Encourage pt to monitor pain and request assistance  - Assess pain using appropriate pain scale  - Administer analgesics based on type and

## 2020-07-05 NOTE — PROGRESS NOTES
[facility] Cardiology Progress Note      Teo Cantrell Patient Status:  Inpatient    3/22/1955 MRN [id number]   Location [facility] 5SW/SE Attending Charly Hull MD   Hosp Day # 3 PCP Gary Youngblood MD     Subjective:    No 07/04/20  0741     --  141 140   K  --  4.1 3.9 4.2     --  109 106   CO2 27.0  --  26.0 28.0   BUN 15  --  16 17   CREATSERUM 1.07  --  0.92 1.03   GLU 99  --  109* 99       Recent Labs   Lab 07/03/20  0614 07/04/20  0741 07/05/20  0737   INR

## 2020-07-05 NOTE — PROGRESS NOTES
[facility]    Progress Note    Gabriel Landau Patient Status:  Inpatient    3/22/1955 MRN [id number]   Location [facility] 5SW/SE Attending Nestor Hernandez MD   Hosp Day # 3 PCP Jared Castillo MD       Subjective:   Karlo Lopez bisacodyl, Normal Saline Flush, morphINE sulfate **OR** morphINE sulfate **OR** morphINE sulfate, acetaminophen **OR** HYDROcodone-acetaminophen **OR** HYDROcodone-acetaminophen, ondansetron HCl, Metoclopramide HCl    Results:     Recent Labs   Lab 07/02 AM          Xr Shoulder, Complete (min 2 Views), Right (cpt=73030)    Result Date: 7/2/2020  CONCLUSION: Mild osteoarthritis.     Dictated by (CST): Wanda Gayle MD on 7/02/2020 at 3:23 PM     Finalized by (CST): Wanda Gayle MD on 7/02/2020 at 3: of right hip. 4. Right knee osteoarthritis including advanced degenerative changes in medial compartment. 5. Small fat containing umbilical hernia. 6. Atherosclerotic vascular calcification.  7. Old adductor longus muscle strain with partial fatty replacem bilateral lateral recess stenosis. 3. Mild lumbar dextroscoliosis. 4. Infrarenal abdominal aortic aneurysm measuring 3.4 cm diameter. 5. Significant image degradation secondary to patient related motion artifact.  1.  Remote - PS  Dictated by (CST): [employer] Shanna Romberg, MD  [facility]       **Certification      PHYSICIAN Certification of Need for Inpatient Hospitalization - Initial Certification    Patient will require inpatient services that will reasonably be expected to span two midnight's ba

## 2020-07-05 NOTE — CONSULTS
[facility]    Report of Consultation    Mary Grace Sanches Patient Status:  Inpatient    3/22/1955 MRN [id number]   Location [facility] 5SW/SE Attending Ronan Sanders MD   Hosp Day # 3 PCP Salomón Gr MD     Date of [facility])    • B12 deficiency    • Clot 2009    Right leg clot, coumadin   • Congestive heart failure ([facility] [address] 2010    medication   • Hyperthyroidism     Amiodarone induced Hyperthyroidism, D/C amiodarone   • Lipoma     Excision lipoma of back   • Low serum vitam mcg, Oral, Daily  LORazepam (ATIVAN) injection 1 mg, 1 mg, Intravenous, Once  Normal Saline Flush 0.9 % injection 3 mL, 3 mL, Intravenous, PRN  morphINE sulfate (PF) 2 MG/ML injection 1 mg, 1 mg, Intravenous, Q2H PRN    Or  morphINE sulfate (PF) 2 MG/ML in alopecia, no photosensitivity, no psoriasis  HEENT: No dry eyes, no dry mouth, no Raynaud's, no nasal ulcers, no parotid swelling, no neck pain, no jaw pain, no temple pain  Eyes: No visual changes,   CVS: No chest pain, hx o fo heart disease  RS: No SOB, 23 11/25/2019    INR 2.92 (H) 07/05/2020    PTP 30.0 (H) 07/05/2020    T4F 0.39 (L) 06/09/2014    TSH 1.640 07/05/2020    PSA 0.8 10/31/2018    ESRML 48 (H) 07/03/2020    CRP 15.10 (H) 07/03/2020    CK 26 (L) 07/03/2020    B12 411 07/02/2020       Componen 4:06 PM     Finalized by (CST): Miguel Duarte MD on 7/04/2020 at 4:15 PM          Ct Femur Right (cpt=73700)    Result Date: 7/4/2020  CONCLUSION:  1. No evidence of hematoma or other acute finding.  2. Multiple varicose veins in superficial soft tissues s only has mild  numbness or tingling in his toes -    - would get EMG of lower extremities   - will check for rare rheumatologic conditions - ?  Sarcoid, antiphospholipid syndrome, lupus screen is pending, spep, and vaculitis studies - like Cryoglobulin stud

## 2020-07-05 NOTE — PHYSICAL THERAPY NOTE
PHYSICAL THERAPY TREATMENT NOTE - INPATIENT     Room Number: 552/552-A       Presenting Problem: Pt admitted t ER w/ progressive weakness, LT knee pain and inabilit to walk for few weeks    Problem List  Principal Problem:    Myopathy  Active Problems: unable to SLR either LE, limited hamstring strength 2-/5 left, 3-/5 right, ankle df/pf supine 3/5 but unable to active df or pf in standing. Hip strength right 2+ to 3-/5, left 2- to 3-/5. Bilateral quads 3/5 with significant effort.      DISCHARGE RECOMMEN Need to walk in hospital room?: A Lot   -   Climbing 3-5 steps with a railing?: A Lot     AM-PAC Score:  Raw Score: 12   Approx Degree of Impairment: 68.66%   Standardized Score (AM-PAC Scale): 35.33   CMS Modifier (G-Code): CL    FUNCTIONAL ABILITY STATUS slowly, using RW and mod Ax2   Goal #4 Patient will negotiate 2 stairs/one curb w/ assistive device and min a   Goal #4   Current Status  NT, unable   Goal #5 Patient to demonstrate independence with home activity/exercise instructions provided to patient

## 2020-07-05 NOTE — PLAN OF CARE
Problem: Patient/Family Goals  Goal: Patient/Family Long Term Goal  Description  Patient's Long Term Goal: to be discharged    Interventions: follow MD orders      Outcome: Progressing  Goal: Patient/Family Short Term Goal  Description  Patient's Short T administer replacement therapy as ordered  Outcome: Progressing     Problem: MUSCULOSKELETAL - ADULT  Goal: Return mobility to safest level of function  Description  INTERVENTIONS:  - Assess patient stability and activity tolerance for standing, transferri precautions as indicated by assessment.  - Educate pt/family on patient safety including physical limitations  - Instruct pt to call for assistance with activity based on assessment  - Modify environment to reduce risk of injury  - Provide assistive device

## 2020-07-06 ENCOUNTER — APPOINTMENT (OUTPATIENT)
Dept: CT IMAGING | Facility: HOSPITAL | Age: 65
DRG: 565 | End: 2020-07-06
Attending: HOSPITALIST
Payer: COMMERCIAL

## 2020-07-06 ENCOUNTER — APPOINTMENT (OUTPATIENT)
Dept: MRI IMAGING | Facility: HOSPITAL | Age: 65
DRG: 565 | End: 2020-07-06
Attending: HOSPITALIST
Payer: COMMERCIAL

## 2020-07-06 LAB
ANION GAP SERPL CALC-SCNC: 7 MMOL/L (ref 0–18)
BASOPHILS # BLD AUTO: 0.02 X10(3) UL (ref 0–0.2)
BASOPHILS NFR BLD AUTO: 0.3 %
BUN BLD-MCNC: 23 MG/DL (ref 7–18)
BUN/CREAT SERPL: 27.1 (ref 10–20)
CALCIUM BLD-MCNC: 8.8 MG/DL (ref 8.5–10.1)
CHLORIDE SERPL-SCNC: 109 MMOL/L (ref 98–112)
CO2 SERPL-SCNC: 23 MMOL/L (ref 21–32)
CREAT BLD-MCNC: 0.85 MG/DL (ref 0.7–1.3)
DEPRECATED RDW RBC AUTO: 47 FL (ref 35.1–46.3)
EOSINOPHIL # BLD AUTO: 0.07 X10(3) UL (ref 0–0.7)
EOSINOPHIL NFR BLD AUTO: 0.9 %
ERYTHROCYTE [DISTWIDTH] IN BLOOD BY AUTOMATED COUNT: 13.7 % (ref 11–15)
GLUCOSE BLD-MCNC: 104 MG/DL (ref 70–99)
HBV CORE AB SERPL QL IA: NONREACTIVE
HBV SURFACE AG SER-ACNC: <0.1 [IU]/L
HBV SURFACE AG SERPL QL IA: NONREACTIVE
HCT VFR BLD AUTO: 41.9 % (ref 39–53)
HCV AB SERPL QL IA: NONREACTIVE
HGB BLD-MCNC: 13.3 G/DL (ref 13–17.5)
IMM GRANULOCYTES # BLD AUTO: 0.05 X10(3) UL (ref 0–1)
IMM GRANULOCYTES NFR BLD: 0.7 %
INR BLD: 3.32 (ref 0.9–1.2)
LYMPHOCYTES # BLD AUTO: 1.2 X10(3) UL (ref 1–4)
LYMPHOCYTES NFR BLD AUTO: 16.2 %
MCH RBC QN AUTO: 29.7 PG (ref 26–34)
MCHC RBC AUTO-ENTMCNC: 31.7 G/DL (ref 31–37)
MCV RBC AUTO: 93.5 FL (ref 80–100)
MONOCYTES # BLD AUTO: 0.75 X10(3) UL (ref 0.1–1)
MONOCYTES NFR BLD AUTO: 10.1 %
NEUTROPHILS # BLD AUTO: 5.32 X10 (3) UL (ref 1.5–7.7)
NEUTROPHILS # BLD AUTO: 5.32 X10(3) UL (ref 1.5–7.7)
NEUTROPHILS NFR BLD AUTO: 71.8 %
OSMOLALITY SERPL CALC.SUM OF ELEC: 292 MOSM/KG (ref 275–295)
PLATELET # BLD AUTO: 191 10(3)UL (ref 150–450)
POTASSIUM SERPL-SCNC: 4.3 MMOL/L (ref 3.5–5.1)
PROTHROMBIN TIME: 33.2 SECONDS (ref 11.8–14.5)
RBC # BLD AUTO: 4.48 X10(6)UL (ref 3.8–5.8)
SODIUM SERPL-SCNC: 139 MMOL/L (ref 136–145)
WBC # BLD AUTO: 7.4 X10(3) UL (ref 4–11)

## 2020-07-06 PROCEDURE — 74177 CT ABD & PELVIS W/CONTRAST: CPT | Performed by: HOSPITALIST

## 2020-07-06 PROCEDURE — 99233 SBSQ HOSP IP/OBS HIGH 50: CPT | Performed by: HOSPITALIST

## 2020-07-06 PROCEDURE — 70551 MRI BRAIN STEM W/O DYE: CPT | Performed by: HOSPITALIST

## 2020-07-06 PROCEDURE — 71260 CT THORAX DX C+: CPT | Performed by: HOSPITALIST

## 2020-07-06 NOTE — PROGRESS NOTES
[facility]    Progress Note    Niki Hasnon Patient Status:  Inpatient    3/22/1955 MRN [id number]   Location [facility] 5SW/SE Attending Alison Waddell MD   Hosp Day # 4 PCP Helane Lundborg, MD       Subjective:   Nixon Morgan Oral Daily       Current PRN Inpatient Meds:      bisacodyl, Normal Saline Flush, morphINE sulfate **OR** morphINE sulfate **OR** morphINE sulfate, acetaminophen **OR** HYDROcodone-acetaminophen **OR** HYDROcodone-acetaminophen, ondansetron HCl, Metoclopra 11:43 AM          Xr Shoulder, Complete (min 2 Views), Right (cpt=73030)    Result Date: 7/2/2020  CONCLUSION: Mild osteoarthritis.     Dictated by (CST): Samanta Vaca MD on 7/02/2020 at 3:23 PM     Finalized by (CST): Samanta Vaca MD on 7/02/2020 osteoarthritis of right hip. 4. Right knee osteoarthritis including advanced degenerative changes in medial compartment. 5. Small fat containing umbilical hernia. 6. Atherosclerotic vascular calcification.  7. Old adductor longus muscle strain with partial cord. 4. A 3 cm infrarenal abdominal aortic aneurysm seen on the  series.     Dictated by (CST): Miguel Duarte MD on 7/02/2020 at 1:36 PM     Finalized by (CST): Miguel Duarte MD on 7/02/2020 at 1:43 PM          Mri Spine Lumbar (zvt=[id number])    Result imaging. Borderline mediastinal lymphadenopathy is new since 7/12/2013 and is likely reactive. This can also be assessed for resolution on subsequent follow-up imaging.    Otherwise, no evidence of primary malignancy or metastatic disease within the chest workup    John Cordova MD  [facility]       **Certification      PHYSICIAN Certification of Need for Inpatient Hospitalization - Initial Certification    Patient will require inpatient services that will reasonably be expected to spa

## 2020-07-06 NOTE — CM/SW NOTE
1600:  CM met with pt and wife at bedside for initial assessment and to discuss discharge planning. CM confirmed pt's address. Pt lives in a 1 level house with spouse. There is/are 1 steps into the home and 0 steps to the bedrooms.  Pt states having no hx

## 2020-07-06 NOTE — PLAN OF CARE
A/Ox4. Fall risk precautions appropriate for pt: bed in lowest position, call light within reach. Q2hr turns with pillow supports. Pt;s hygienic state this am-dirty, soiled with dry BM. Full body hygiene care given today.   One dose of Norco given for raisa trend weights  Outcome: Progressing  Goal: Absence of cardiac arrhythmias or at baseline  Description  INTERVENTIONS:  - Continuous cardiac monitoring, monitor vital signs, obtain 12 lead EKG if indicated  - Evaluate effectiveness of antiarrhythmic and hea interventions unsuccessful or patient reports new pain  - Anticipate increased pain with activity and pre-medicate as appropriate  Outcome: Progressing     Problem: SAFETY ADULT - FALL  Goal: Free from fall injury  Description  INTERVENTIONS:  - Assess pt

## 2020-07-06 NOTE — PROGRESS NOTES
I spoke with Dr. Daljit Waggoner, who stated to me that from a neurology standpoint patient does not need [facility] conduction study. I am not sure that [facility] conduction studies can be performed in the hospital on in patients.   If this continues to be a clini

## 2020-07-06 NOTE — PROGRESS NOTES
[facility] Cardiology Progress Note      [facility] Patient Status:  Inpatient    3/22/1955 MRN [id number]   Location [facility] 5SW/SE Attending Skeeter Jeans, MD   Hosp Day # 4 PCP Nancy Garber MD     Subjective:    No 07/02/20  1207 07/02/20  1304 07/03/20  0614 07/04/20  0741 07/06/20  0656     --  141 140 139   K  --  4.1 3.9 4.2 4.3     --  109 106 109   CO2 27.0  --  26.0 28.0 23.0   BUN 15  --  16 17 23*   CREATSERUM 1.07  --  0.92 1.03 0.85   GLU 99  - months suggested to demonstrate stability. 0.6 cm pulmonary nodule within the right upper lobe is indeterminate. This can also be assessed for resolution on subsequent follow-up imaging.        Borderline mediastinal lymphadenopathy is new since 7/12/2

## 2020-07-07 LAB
ALBUMIN SERPL ELPH-MCNC: 2.51 G/DL (ref 3.75–5.21)
ALBUMIN/GLOB SERPL: 0.76 {RATIO} (ref 1–2)
ALDOLASE, SERUM: 5.2 U/L
ALPHA1 GLOB SERPL ELPH-MCNC: 0.49 G/DL (ref 0.19–0.46)
ALPHA2 GLOB SERPL ELPH-MCNC: 1 G/DL (ref 0.48–1.05)
ANGIOTENSIN CONVERTING ENZYME: <5 U/L
B-GLOBULIN SERPL ELPH-MCNC: 0.81 G/DL (ref 0.68–1.23)
BETA 2 GLYCOPROTEIN 1 AB, IGG: 1.7 U/ML (ref ?–7)
BETA 2 GLYCOPROTEIN 1 AB, IGM: 4.1 U/ML (ref ?–7)
CARDIOLIPIN IGG SERPL-ACNC: 1.3 GPL (ref ?–10)
CARDIOLIPIN IGM SERPL-ACNC: 81 MPL (ref ?–10)
GAMMA GLOB SERPL ELPH-MCNC: 0.98 G/DL (ref 0.62–1.7)
INR BLD: 2.88 (ref 0.9–1.2)
JO-1 (HISTIDY1-TRNA SYNTHETASE) AB, IGG: 6 AU/ML
KAPPA FREE LIGHT CHAIN: 2.56 MG/DL (ref 0.33–1.94)
KAPPA/LAMBDA FLC RATIO: 0.87 (ref 0.26–1.65)
LAMBDA FREE LIGHT CHAIN: 2.92 MG/DL (ref 0.57–2.63)
M PROTEIN MFR SERPL ELPH: 5.8 G/DL (ref 6.4–8.2)
PROTHROMBIN TIME: 29.7 SECONDS (ref 11.8–14.5)

## 2020-07-07 PROCEDURE — 99232 SBSQ HOSP IP/OBS MODERATE 35: CPT | Performed by: INTERNAL MEDICINE

## 2020-07-07 PROCEDURE — 99232 SBSQ HOSP IP/OBS MODERATE 35: CPT | Performed by: HOSPITALIST

## 2020-07-07 NOTE — PLAN OF CARE
Pt is alert and oriented. Plan will be to send pt to acute rehab sometime today after wife makes her final choice. Will continue to monitor.        Problem: Patient/Family Goals  Goal: Patient/Family Long Term Goal  Description  Patient's Long Term Goal: to and heart rate control medications as ordered  - Initiate emergency measures for life threatening arrhythmias  - Monitor electrolytes and administer replacement therapy as ordered  Outcome: Progressing     Problem: MUSCULOSKELETAL - ADULT  Goal: Return mob frequently for physical needs  - Identify cognitive and physical deficits and behaviors that affect risk of falls.   - [place] fall precautions as indicated by assessment.  - Educate pt/family on patient safety including physical limitations  - Instruct p

## 2020-07-07 NOTE — PROGRESS NOTES
[facility] Cardiology Progress Note      Franki De Jesus Patient Status:  Inpatient    3/22/1955 MRN [id number]   Location [facility] 5SW/SE Attending Tessa Goodman MD   Hosp Day # 5 PCP Princess Linda MD     Subjective:    Nazario Willoughby 189.0 157.0 182.0 191.0       Recent Labs   Lab 07/02/20  1207 07/02/20  1304 07/03/20  0614 07/04/20  0741 07/06/20  0656     --  141 140 139   K  --  4.1 3.9 4.2 4.3     --  109 106 109   CO2 27.0  --  26.0 28.0 23.0   BUN 15  --  16 17 23* Follow-up high-resolution chest CT suggested in 6 months suggested to demonstrate stability. 0.6 cm pulmonary nodule within the right upper lobe is indeterminate. This can also be assessed for resolution on subsequent follow-up imaging.        Princess Zamarripa

## 2020-07-07 NOTE — PROGRESS NOTES
[facility]    Progress Note    Gaby Ocampo Patient Status:  Inpatient    3/22/1955 MRN [id number]   Location [facility] 5SW/SE Attending Meenakshi Nguyen MD   Hosp Day # 5 PCP Michelle Staples MD        Subjective: tingling in his toes -     - Neurology considerlng getting EMG of lower extremities as outpatient. - will check for rare rheumatologic conditions -ACE negative. B2GP1 antibodies negative.  DAVID IgG negative, DAVID IgM 81 (elevated, but unlikely to be signifi by (CST): Pratik Garber MD on 7/06/2020 at 2:30 PM     Finalized by (CST): Pratik Garber MD on 7/06/2020 at 2:49 PM          Ct Chest+abdomen+pelvis(all Cntrst Only)(cpt=71260/01562)    Result Date: 7/6/2020  CONCLUSION:   Subtle areas of subpleural reticu

## 2020-07-07 NOTE — CONSULTS
[facility] Patient Status:  Inpatient    3/22/1955 MRN [id number]   [facility] 5SW/SE Attending Skeeter Jeans, MD   Hosp Day # 5 PCP Nancy Garber MD     Patient Identification  Chase Amado is a 76 Trileaflet; mildly     calcified leaflets. · 3. Mitral valve: Mild regurgitation. · Tele: atrial fibrillation rates controlled    MRI Brain 7/6/20 CONCLUSION:      1.  Moderate diffuse cerebral atrophy with evidence of chronic ischemic microvascular santoyo Circumstances (i.e., potential caregivers): spouse / significant other  Home Environment / Accessibility: 1-story house/ trailer  Current Functional Status: Mod-Max A      Past Medical History:  @Medical hx@  Past Medical History:   Diagnosis Date   • Alco Microsphere (DEFINITY) 6.52 MG/ML injection 1.5 mL, 1.5 mL, Intravenous, ONCE PRN  LORazepam (ATIVAN) injection 1 mg, 1 mg, Intravenous, Once  [COMPLETED] LORazepam (ATIVAN) tab 1 mg, 1 mg, Oral, Once  [COMPLETED] Gadoterate Meglumine (DOTAREM) 10 MMOL/20M °F (36.3 °C), temperature source Oral, resp. rate 18, height 6' 2\" (1.88 m), weight (!) 311 lb 6.4 oz (141.3 kg), SpO2 94 %.     Intake/Output Summary (Last 24 hours) at 7/7/2020 1212  Last data filed at 7/7/2020 0800  Gross per 24 hour   Intake 490 ml   O LE weakness/gait impairments. .    Cardiomyopathy with EF 25-30%    A-fib    MITCHEL    HTN    HLD    PLAN:          No clear etiology for the LE weakness.   Please consider EMG/muscle biopsy/LP/serology testing for Lyme's/West Nile etc.         Based on patient

## 2020-07-07 NOTE — PLAN OF CARE
A/Ox4. Fall risk precautions appropriate for pt: bed in lowest position, call light within reach. Q2hr turns with pillow supports. PT/OT today-Pt spent some time on the chair. Spouse at bedside. No complaints of pain. No signs of distress at this time. cardiac monitoring, monitor vital signs, obtain 12 lead EKG if indicated  - Evaluate effectiveness of antiarrhythmic and heart rate control medications as ordered  - Initiate emergency measures for life threatening arrhythmias  - Monitor electrolytes and a Progressing     Problem: SAFETY ADULT - FALL  Goal: Free from fall injury  Description  INTERVENTIONS:  - Assess pt frequently for physical needs  - Identify cognitive and physical deficits and behaviors that affect risk of falls.   - [place] fall precaut

## 2020-07-07 NOTE — OCCUPATIONAL THERAPY NOTE
OCCUPATIONAL THERAPY TREATMENT NOTE - INPATIENT        Room Number: 552/552-A           Presenting Problem: potential myopathy; ongoing work up to determine diagnosis    Problem List  Principal Problem:    Myopathy  Active Problems:    Chronic pain of left the last 6 weeks he has been crawling from the couch (too low to stand from) to the recliner (easier to stand from).      The patient's Approx Degree of Impairment: 53.32% has been calculated based on documentation in the [facility] '6 clicks' Inpatient Daily Ac None    AM-PAC Score:  Score: 16  Approx Degree of Impairment: 53.32%  Standardized Score (AM-PAC Scale): 35.96  CMS Modifier (G-Code): CK    FUNCTIONAL TRANSFER ASSESSMENT  Supine to Sit : Moderate assistance  Sit to Stand:  Moderate assistance(x2)  Chair

## 2020-07-07 NOTE — PHYSICAL THERAPY NOTE
PHYSICAL THERAPY TREATMENT NOTE - INPATIENT     Room Number: 552/552-A       Presenting Problem: Pt admitted t ER w/ progressive weakness, LT knee pain and inabilit to walk for few weeks    Problem List  Principal Problem:    Myopathy  Active Problems: education;Gait training;Neuromuscular re-educate;Strengthening;Transfer training;Balance training    SUBJECTIVE  \"How much time do you have? \"     OBJECTIVE  Precautions: Bed/chair alarm    WEIGHT BEARING RESTRICTION  Weight Bearing Restriction: None  R L Session: Up in chair;Needs met;Call light within reach;RN aware of session/findings; All patient questions and concerns addressed; Alarm set    CURRENT GOALS     Goals to be met by: 7.10.20  Patient Goal Patient's self-stated goal is: did not state   Goal #1

## 2020-07-07 NOTE — PROGRESS NOTES
[facility]    Progress Note    Lesaranjan Blue Patient Status:  Inpatient    3/22/1955 MRN [id number]   Location [facility] 5SW/SE Attending Yuri Robertson MD   Hosp Day # 5 PCP Rayray Major MD       Subjective:   Zacarias Singh Intravenous Once   • aspirin  81 mg Oral Daily   • atorvastatin  10 mg Oral Nightly   • carvedilol  25 mg Oral BID with meals   • lisinopril  20 mg Oral Daily       Current PRN Inpatient Meds:      bisacodyl, Normal Saline Flush, morphINE sulfate **OR** mo from Nayeli 15, 2020. Dictated by (CST): Jacob Harry MD on 7/02/2020 at 11:39 AM     Finalized by (CST):  Jacob Harry MD on 7/02/2020 at 11:43 AM          Xr Shoulder, Complete (min 2 Views), Right (cpt=73030)    Result Date: 7/2/2020  CONCLUSI finding. 2. Multiple varicose veins in superficial soft tissues some of which are calcified related to chronic thrombosis. 3. Mild-to-moderate osteoarthritis of right hip.  4. Right knee osteoarthritis including advanced degenerative changes in medial esteban associated with small Schmorl's nodes. 2. Mild multilevel thoracic degenerative disc disease. No significant stenosis. 3. Normal thoracic cord. 4. A 3 cm infrarenal abdominal aortic aneurysm seen on the  series.     Dictated by (CST): Gabriel Henning 0.6 cm pulmonary nodule within the right upper lobe is indeterminate. This can also be assessed for resolution on subsequent follow-up imaging. Borderline mediastinal lymphadenopathy is new since 7/12/2013 and is likely reactive.  This can also be assess melatonin    Constipation  - Miralax daily  - colace 100mg BID    HL  - atorvastatin 10mg HS  - monitor    HTN  - Q shift vitals  - Lisinopril 20mg daily  - monitor    Discharge Planning  - PMR consult, recommend acute care  - referrals to be sent  - SW as

## 2020-07-07 NOTE — CM/SW NOTE
CM made aware by Jefferson Vu and RN that PMR consult is done and recommending [facility] Placement. PMR uploaded to [facility] for review. CM met with pt at bedside to discuss dc options. At this time  is the only accepting facility.   Pt not to keen on going to

## 2020-07-08 LAB
CRP SERPL HS-MCNC: 96.1 MG/L (ref ?–3)
ERYTHROCYTE [SEDIMENTATION RATE] IN BLOOD: 63 MM/HR (ref 0–20)
INR BLD: 2.98 (ref 0.9–1.2)
MYELOPEROX ANTIBODIES, IGG: 3 AU/ML
NUCLEAR IGG TITR SER IF: POSITIVE {TITER}
PROTHROMBIN TIME: 30.5 SECONDS (ref 11.8–14.5)
SERINE PROTEASE3, IGG: 16 AU/ML
VITAMIN B1 (THIAMINE), WHOLE B: 106 NMOL/L

## 2020-07-08 PROCEDURE — 99233 SBSQ HOSP IP/OBS HIGH 50: CPT | Performed by: HOSPITALIST

## 2020-07-08 PROCEDURE — 99232 SBSQ HOSP IP/OBS MODERATE 35: CPT | Performed by: INTERNAL MEDICINE

## 2020-07-08 RX ORDER — WARFARIN SODIUM 1 MG/1
1 TABLET ORAL NIGHTLY
Status: DISCONTINUED | OUTPATIENT
Start: 2020-07-08 | End: 2020-07-09

## 2020-07-08 RX ORDER — DIGOXIN 125 MCG
125 TABLET ORAL DAILY
Qty: 30 TABLET | Refills: 0 | Status: SHIPPED | OUTPATIENT
Start: 2020-07-09 | End: 2020-08-10
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
## 2020-07-08 NOTE — PROGRESS NOTES
[facility]    Progress Note    Niki Hanson Patient Status:  Inpatient    3/22/1955 MRN [id number]   Location [facility] 5SW/SE Attending Alison Waddell MD   Hosp Day # 6 PCP Helane Lundborg, MD       Subjective:   Moisés sulfate, acetaminophen **OR** HYDROcodone-acetaminophen **OR** HYDROcodone-acetaminophen, ondansetron HCl, Metoclopramide HCl    Results:     Recent Labs   Lab 07/03/20  0614 07/04/20  0751 07/06/20  0656   RBC 4.61 4.78 4.48   HGB 13.8 14.2 13.3   HCT 42. osteoarthritis. Dictated by (CST): Samanta Vaca MD on 7/02/2020 at 3:23 PM     Finalized by (CST): Samanta Vaca MD on 7/02/2020 at 3:25 PM          Ct Pelvis (cpt=72192)    Result Date: 7/4/2020  CONCLUSION:  1.  No evidence of hematoma or othe Small fat containing umbilical hernia. 6. Atherosclerotic vascular calcification. 7. Old adductor longus muscle strain with partial fatty replacement.     Dictated by (CST): Felicitas Das MD on 7/04/2020 at 3:57 PM     Finalized by (CST): Felicitas Das MD 7/02/2020 at 1:36 PM     Finalized by (CST): Randall Vu MD on 7/02/2020 at 1:43 PM          Mri Spine Lumbar (cpt=72148)    Result Date: 7/2/2020  CONCLUSION:   1. L5-S1:  Moderate right and mild left neural foraminal stenosis. 2.  L4-L5:  Mild right ne resolution on subsequent follow-up imaging. Otherwise, no evidence of primary malignancy or metastatic disease within the chest, abdomen or pelvis. Aneurysmal dilation of the infrarenal abdominal aorta, new since 7/12/2013.  Yearly surveillance could be assisting  - medically stable to discharge to acute rehab    VTE P: on coumadin    Greater than 35 minutes spent, >50% spent counseling re: treatment plan and workup - d/w wife

## 2020-07-08 NOTE — PROGRESS NOTES
[facility]    Progress Note    Tomy Salter Patient Status:  Inpatient    3/22/1955 MRN [id number]   Location [facility] 5SW/SE Attending Arzella Saint, MD   Hosp Day # 6 PCP Shailesh Bartlett MD        Subjective:   Adam Hinson Ab, IgG      0 - 40 AU/mL 6     COMPLEMENT C3      90.0 - 180.0 mg/dL  153.0    COMPLEMENT C4      10.0 - 40.0 mg/dL  31.5    Magnesium, Serum      1.6 - 2.6 mg/dL  2.2      Component      Latest Ref Rng & Units 7/3/2020             BETA 2 GLYCOPROTEIN 1 A as outpatient. - will check for rare rheumatologic conditions -ACE negative. B2GP1 antibodies negative. DAVID IgG negative, DAVID IgM 81 (elevated, but unlikely to be significant without thrombosis), GENA, ANCA, Cryoglobulins, Jo1 antibody remain pending.    - No evidence for acute infarct. 2. Left maxillary sinus disease.     Dictated by (CST): Jaclyn Anguiano MD on 7/06/2020 at 2:30 PM     Finalized by (CST): Jaclyn Anguiano MD on 7/06/2020 at 2:49 PM                     Gisel Levin MD  7/8/2020

## 2020-07-08 NOTE — PROGRESS NOTES
[facility] Cardiology Progress Note      Glory Rochester Patient Status:  Inpatient    3/22/1955 MRN [id number]   Location [facility] 5SW/SE Attending Lorena Washburn MD   Hosp Day # 6 PCP Yadi Woodard MD     Subjective:    Brandt Ross HGB 15.0 13.8 14.2 13.3   .0 157.0 182.0 191.0       Recent Labs   Lab 07/02/20  1207 07/02/20  1304 07/03/20  0614 07/04/20  0741 07/06/20  0656     --  141 140 139   K  --  4.1 3.9 4.2 4.3     --  109 106 109   CO2 27.0  --  26.0 28. also within   differential.  Follow-up high-resolution chest CT suggested in 6 months suggested to demonstrate stability. 0.6 cm pulmonary nodule within the right upper lobe is indeterminate.   This can also be assessed for resolution on subsequent foll

## 2020-07-08 NOTE — PAYOR COMM NOTE
--------------  CONTINUED STAY REVIEW-----CLINICALS FOR 7/7 AND 7/8      Payor: [address] [place] [address] Road #:  [id number]  Authorization Number: [id number]    Admit date: 7/2/20  Admit time: 0    Admitting Physician: Jillian Garay NECK:  Supple. No JVD. CHEST:  Symmetrical movement on inspiration  LUNGS:  No audible wheezing  CVS: S1 S2 heard, IR  ABDOMEN: Non-distended  MUSCULOSKELETAL:  There was no obvious deformity. EXTREMITIES: There was no edema, varicosities b/l.   L knee Component Value Date     A1C 5.8 (H) 01/02/2019         Culture:        Hospital Encounter on 07/02/20   1.  BODY FLUID CULT,AEROBIC AND ANAEROBIC     Status: None (Preliminary result)     Collection Time: 07/03/20 11:26 AM   Result Value Ref Range     Body CONCLUSION:  1. No acute fracture. 2. Multiple left lower extremity varicose veins some of which are calcified related to chronic thrombophlebitis.  3. Superficial soft tissue edema most pronounced in the lateral distal thigh and at the level of the lateral CONCLUSION:  1. Large left knee effusion. 2.  Fluid signal along the length of the visualized portions of the quadriceps tendon suggesting grade 2 strain however correlate for quadriceps weakness. 3.  Mild tendinopathy at the popliteus tendon origin.  4. CONCLUSION: There is disc disease and osteoarthritis in the cervical spine as above resulting in neural foraminal narrowing which is mostly right-sided at C4-5 through C6-7. No abnormal enhancing lesion within the cord.   No abnormal marrow lesion or enhan L knee pain with inability to ambulate, suspect due to trauma   L knee effusion, possibly hematoma, sepsis, crystal arthropathy   B/L lower extremity weakness  - s/p L knee arthrocentesis -->consistent with OA, no crystals or infection  - CK low  - GENA pen Feeling stronger. No new weakness/numbness/tingling. No chest pain or sob. No fevers/chills.   No n/v/abd pain.      Objective:        07/07/20  2204 07/08/20  0358 07/08/20  0915 07/08/20  0918   BP: 90/62 113/63 (!) 111/93     BP Location: Right arm Ri HCT 42.6 44.3 41.9   MCV 92.4 92.7 93.5   MCH 29.9 29.7 29.7   MCHC 32.4 32.1 31.7   RDW 13.7 13.6 13.7   NEPRELIM 5.69 5.65 5.32   WBC 8.0 8.0 7.4   .0 182.0 191.0            Recent Labs   Lab 07/03/20  0614 07/04/20  0741 07/06/20  0656   * Result Date: 7/4/2020  CONCLUSION:  1. No evidence of hematoma or other acute finding. 2. Colonic diverticulosis. 3. Atherosclerotic vascular calcification. 4. Small fat containing right inguinal hernia and umbilical hernia.  5. Varicose veins in the proxim CONCLUSION:  1. No evidence of hematoma or other acute finding. 2. Multiple varicose veins in superficial soft tissues some of which are calcified related to chronic thrombosis. 3. Mild-to-moderate osteoarthritis of right hip.  4. Right knee osteoarthritis CONCLUSION:  1. T6-7:  Disc degeneration with tiny non impinging central disc protrusion. Mild endplate discogenic edema associated with small Schmorl's nodes. 2. Mild multilevel thoracic degenerative disc disease. No significant stenosis.  3. Normal thor CONCLUSION:   Subtle areas of subpleural reticulation and septal thickening within the posterior aspects of the bilateral upper and lower lobes may be secondary to atelectasis or inflammation.   Early fibrotic changes are felt less likely but are also withi - will need outpatient follow up with Neuro, Rheum and ortho as well as cardiology  - EMG as outpt     Cardiomyopathy   Last recorded EF 25-30%  - 2D echo, reviewed   -  Daily I's and O's  - Aspirin 81mg daily  - f/u cardiology for possible ICD     Chronic Cleared for session by RN. PPE donned: gloves and mask. Care coordinated for session with OT. Pt greeted in bed, pleasant and agreeable to therapy.  Pt requiring increased time to perform all tasks in session, pt easily distracted with difficulty redirectin Management Techniques: Activity promotion; Body mechanics;Repositioning     BALANCE                                                                                                                     Static Sitting: Fair +  Dynamic Sitting: Fair + Goal #1 Patient is able to demonstrate supine - sit EOB @ level:mod a via log roll from flat bed.    Goal #1   Current Status MET  New goal (7/7/2020): Pt to perform supine<>sit with supervision.     Goal #2 Patient is able to demonstrate transfers sit <-> Warfarin Sodium (COUMADIN) tab 3 mg     Date Action Dose Route User    7/7/2020 2208 Given 3 mg Oral Chema Esquivel RN          Procedures:      Plan:

## 2020-07-09 LAB
ANION GAP SERPL CALC-SCNC: 4 MMOL/L (ref 0–18)
BASOPHILS # BLD AUTO: 0.03 X10(3) UL (ref 0–0.2)
BASOPHILS NFR BLD AUTO: 0.4 %
BUN BLD-MCNC: 17 MG/DL (ref 7–18)
BUN/CREAT SERPL: 16.2 (ref 10–20)
CALCIUM BLD-MCNC: 8.8 MG/DL (ref 8.5–10.1)
CHLORIDE SERPL-SCNC: 109 MMOL/L (ref 98–112)
CO2 SERPL-SCNC: 28 MMOL/L (ref 21–32)
CREAT BLD-MCNC: 1.05 MG/DL (ref 0.7–1.3)
DEPRECATED RDW RBC AUTO: 45.5 FL (ref 35.1–46.3)
EOSINOPHIL # BLD AUTO: 0.06 X10(3) UL (ref 0–0.7)
EOSINOPHIL NFR BLD AUTO: 0.9 %
ERYTHROCYTE [DISTWIDTH] IN BLOOD BY AUTOMATED COUNT: 13.6 % (ref 11–15)
GLUCOSE BLD-MCNC: 98 MG/DL (ref 70–99)
HCT VFR BLD AUTO: 42.1 % (ref 39–53)
HGB BLD-MCNC: 13.5 G/DL (ref 13–17.5)
IMM GRANULOCYTES # BLD AUTO: 0.03 X10(3) UL (ref 0–1)
IMM GRANULOCYTES NFR BLD: 0.4 %
INR BLD: 2.5 (ref 0.9–1.2)
LYMPHOCYTES # BLD AUTO: 1.33 X10(3) UL (ref 1–4)
LYMPHOCYTES NFR BLD AUTO: 19.6 %
MCH RBC QN AUTO: 29.5 PG (ref 26–34)
MCHC RBC AUTO-ENTMCNC: 32.1 G/DL (ref 31–37)
MCV RBC AUTO: 91.9 FL (ref 80–100)
MONOCYTES # BLD AUTO: 0.67 X10(3) UL (ref 0.1–1)
MONOCYTES NFR BLD AUTO: 9.9 %
NEUTROPHILS # BLD AUTO: 4.66 X10 (3) UL (ref 1.5–7.7)
NEUTROPHILS # BLD AUTO: 4.66 X10(3) UL (ref 1.5–7.7)
NEUTROPHILS NFR BLD AUTO: 68.8 %
OSMOLALITY SERPL CALC.SUM OF ELEC: 294 MOSM/KG (ref 275–295)
PLATELET # BLD AUTO: 225 10(3)UL (ref 150–450)
POTASSIUM SERPL-SCNC: 4.2 MMOL/L (ref 3.5–5.1)
PROTHROMBIN TIME: 26.6 SECONDS (ref 11.8–14.5)
RBC # BLD AUTO: 4.58 X10(6)UL (ref 3.8–5.8)
SODIUM SERPL-SCNC: 141 MMOL/L (ref 136–145)
WBC # BLD AUTO: 6.8 X10(3) UL (ref 4–11)

## 2020-07-09 PROCEDURE — 99233 SBSQ HOSP IP/OBS HIGH 50: CPT | Performed by: HOSPITALIST

## 2020-07-09 PROCEDURE — 99232 SBSQ HOSP IP/OBS MODERATE 35: CPT | Performed by: INTERNAL MEDICINE

## 2020-07-09 RX ORDER — WARFARIN SODIUM 2 MG/1
2 TABLET ORAL NIGHTLY
Status: DISCONTINUED | OUTPATIENT
Start: 2020-07-09 | End: 2020-07-13

## 2020-07-09 NOTE — PLAN OF CARE
Discharge planning  - pt is medically stable for discharge  - med rec completed  - insurance denied Acute Care, Peer 2 peer being done 07/09/2020  - awaiting results of peer 2 peer  - monitor    Will continue to follow    LION June  07/09/2020

## 2020-07-09 NOTE — PROGRESS NOTES
[facility] [address] [place] [address] Cardiology Progress Note      Tomigaston Díazabby Patient Status:  Inpatient    3/22/1955 MRN [id number]   Location [facility] 5SW/SE Attending Adeline Prather MD   Hosp Day # 7 PCP Real Frod MD     Subjective:    Chantelle Patterson 13.3 13.5   .0 157.0 182.0 191.0 225.0       Recent Labs   Lab 07/02/20  1207 07/02/20  1304 07/03/20  0614 07/04/20  0741 07/06/20  0656 07/09/20  0702     --  141 140 139 141   K  --  4.1 3.9 4.2 4.3 4.2     --  109 106 109 109   CO2 2 changes are felt less likely but are also within   differential.  Follow-up high-resolution chest CT suggested in 6 months suggested to demonstrate stability. 0.6 cm pulmonary nodule within the right upper lobe is indeterminate.   This can also be asses

## 2020-07-09 NOTE — CM/SW NOTE
Addendum 303:  Per Dany Babcock at [facility] Dr. Douglas Cespedes did not connect for the P2P and states that pt is a \"min/cva overall and ambulates 90 degrees w/ CGA. \"    SW informed pt of denial and discussed next steps.   Pt states he is unsteady on his feet and does not feel

## 2020-07-09 NOTE — PROGRESS NOTES
[facility]    Progress Note    Wesley Brito Patient Status:  Inpatient    3/22/1955 MRN [id number]   Location [facility] 5SW/SE Attending Ramesh Kiser MD   Hosp Day # 7 PCP Leonce Seip, MD       Subjective:   Marcos Butt HYDROcodone-acetaminophen **OR** HYDROcodone-acetaminophen, ondansetron HCl, Metoclopramide HCl    Results:     Recent Labs   Lab 07/04/20  0751 07/06/20  0656 07/09/20  0702   RBC 4.78 4.48 4.58   HGB 14.2 13.3 13.5   HCT 44.3 41.9 42.1   MCV 92.7 93.5 91 (CST): Anne-Marie Paniagua MD on 7/02/2020 at 3:23 PM     Finalized by (CST): Anne-Marie Paniagua MD on 7/02/2020 at 3:25 PM          Ct Pelvis (cpt=72192)    Result Date: 7/4/2020  CONCLUSION:  1. No evidence of hematoma or other acute finding.  2. Colonic div hernia. 6. Atherosclerotic vascular calcification. 7. Old adductor longus muscle strain with partial fatty replacement.     Dictated by (CST): Joie Quiroga MD on 7/04/2020 at 3:57 PM     Finalized by (CST): Joie Quiroga MD on 7/04/2020 at 4:06 PM (CST): Randall Vu MD on 7/02/2020 at 1:43 PM          Mri Spine Lumbar (cpt=72148)    Result Date: 7/2/2020  CONCLUSION:   1. L5-S1:  Moderate right and mild left neural foraminal stenosis. 2.  L4-L5:  Mild right neural foraminal and minimal bilateral l imaging. Otherwise, no evidence of primary malignancy or metastatic disease within the chest, abdomen or pelvis. Aneurysmal dilation of the infrarenal abdominal aorta, new since 7/12/2013.  Yearly surveillance could be performed if intervention is defer discharge to acute rehab - awaiting rehab    VTE P: on coumadin    Greater than 35 minutes spent, >50% spent counseling re: treatment plan and workup - d/w wife

## 2020-07-09 NOTE — PROGRESS NOTES
[facility]    Progress Note    Juan A Cue Patient Status:  Inpatient    3/22/1955 MRN [id number]   Location [facility] 5SW/SE Attending Lourdes Deras MD   Hosp Day # 7 PCP Douglas Beebe MD        Subjective:   Rui Crooks ANCA, RF, CCP, CK, aldolase, ACE, Rashida 1, C3 and C4  - Malignancy work-up negative, CT chest, abdomen pelvis were normal  - Stroke work-up was also negative, MRI of the brain was negative  - MRI of the thoracic and lumbar spine did show degenerative disc dis

## 2020-07-09 NOTE — OCCUPATIONAL THERAPY NOTE
OCCUPATIONAL THERAPY TREATMENT NOTE - INPATIENT        Room Number: 552/552-A           Presenting Problem: weakness, myopathy    Problem List  Principal Problem:    Myopathy  Active Problems:    Chronic pain of left knee    Internal derangement of right s DANIELLE ROLLINS  Management Techniques: Body mechanics; Activity promotion;Relaxation;Repositioning     ACTIVITY TOLERANCE                         O2 SATURATIONS                ACTIVITIES OF DAILY LIVING ASSESSMENT  AM-PAC ‘6-Clicks’ Inpatient Daily Activity  Comment: NT            Goals  on: 2020            Frequency: 3-5x/week

## 2020-07-10 LAB
DSDNA AB TITR SER: <10 {TITER}
INR BLD: 2.61 (ref 0.9–1.2)
PROTHROMBIN TIME: 27.5 SECONDS (ref 11.8–14.5)

## 2020-07-10 PROCEDURE — 99233 SBSQ HOSP IP/OBS HIGH 50: CPT | Performed by: HOSPITALIST

## 2020-07-10 PROCEDURE — 99232 SBSQ HOSP IP/OBS MODERATE 35: CPT | Performed by: INTERNAL MEDICINE

## 2020-07-10 RX ORDER — PREDNISONE 20 MG/1
20 TABLET ORAL
Status: DISCONTINUED | OUTPATIENT
Start: 2020-07-10 | End: 2020-07-13

## 2020-07-10 NOTE — PAYOR COMM NOTE
--------------  CONTINUED STAY REVIEW    Payor: [address] [place] [address] Road #:  [id number]  Authorization Number: [id number]    Admit date: 7/2/20  Admit time: 0    Admitting Physician: Nona Kilgore MD  Attending Physician: LION  07/10/2020    Hospitalist  Assessment and Plan:  L knee pain with inability to ambulate, suspect due to trauma   L knee effusion, possibly hematoma, sepsis, crystal arthropathy   B/L lower extremity weakness  - s/p L knee arthrocentesis -->consistent Date Action Dose Route User    7/10/2020 0900 Given 81 mg Oral Sissy David, RN      carvedilol (COREG) tab 25 mg     Date Action Dose Route User    7/10/2020 0900 Given 25 mg Oral Sissy David, RN    7/9/2020 1741 Given 25 mg Oral Caitlyn Arlin

## 2020-07-10 NOTE — PLAN OF CARE
Problem: Patient/Family Goals  Goal: Patient/Family Long Term Goal  Description  Patient's Long Term Goal: to be discharged    Interventions: follow MD orders      Outcome: Progressing  Goal: Patient/Family Short Term Goal  Description  Patient's Short T

## 2020-07-10 NOTE — PROGRESS NOTES
[facility]    Progress Note    Magi Kong Patient Status:  Inpatient    3/22/1955 MRN [id number]   Location [facility] 5SW/SE Attending Narcisa Vergara MD   Hosp Day # 8 PCP Haydee Robbins MD       Subjective:   Jasmin Fink sulfate **OR** morphINE sulfate, acetaminophen **OR** HYDROcodone-acetaminophen **OR** HYDROcodone-acetaminophen, ondansetron HCl, Metoclopramide HCl    Results:     Recent Labs   Lab 07/04/20  0751 07/06/20  0656 07/09/20  0702   RBC 4.78 4.48 4.58   HGB 7/2/2020  CONCLUSION: Mild osteoarthritis. Dictated by (CST): Aquilino Higginbotham MD on 7/02/2020 at 3:23 PM     Finalized by (CST): Aquilino Higginbotham MD on 7/02/2020 at 3:25 PM          Ct Pelvis (cpt=72192)    Result Date: 7/4/2020  CONCLUSION:  1.  No e in medial compartment. 5. Small fat containing umbilical hernia. 6. Atherosclerotic vascular calcification. 7. Old adductor longus muscle strain with partial fatty replacement.     Dictated by (CST): Torres Aguirre MD on 7/04/2020 at 3:57 PM     Finalized Roland Layton MD on 7/02/2020 at 1:36 PM     Finalized by (CST): Roland Layton MD on 7/02/2020 at 1:43 PM          Mri Spine Lumbar (cpt=72148)    Result Date: 7/2/2020  CONCLUSION:   1. L5-S1:  Moderate right and mild left neural foraminal stenosis.  2. also be assessed for resolution on subsequent follow-up imaging. Otherwise, no evidence of primary malignancy or metastatic disease within the chest, abdomen or pelvis. Aneurysmal dilation of the infrarenal abdominal aorta, new since 7/12/2013.  Yearly Planning  - PMR consult, recommend acute care - this was denied so now waiting for insurance auth for VICK  - referrals to be sent  - SW assisting  - medically stable to discharge to rehab     VTE P: on coumadin    Greater than 35 minutes spent, >50% spent

## 2020-07-10 NOTE — PROGRESS NOTES
[facility]    Progress Note    Teo Cantrell Patient Status:  Inpatient    3/22/1955 MRN [id number]   Location [facility] 5SW/SE Attending Sherley Chirinos MD   Hosp Day # 8 PCP Gary Youngblood MD        Subjective:     Jaiden Huggins MD Suraj           Results:     Lab Results   Component Value Date    WBC 6.8 07/09/2020    HGB 13.5 07/09/2020    HCT 42.1 07/09/2020    .0 07/09/2020    CREATSERUM 1.05 07/09/2020    BUN 17 07/09/2020     07/09/2020    K 4.2 07/09/2020

## 2020-07-10 NOTE — PLAN OF CARE
Pt denies sob, chills, fever, n/v. Pain controled with medication. Medication reviewed. VS monitored. Safety maintained. Call light in reach. We will continue to monitor.    Problem: Patient/Family Goals  Goal: Patient/Family Long Term Goal  Description  Pa effectiveness of antiarrhythmic and heart rate control medications as ordered  - Initiate emergency measures for life threatening arrhythmias  - Monitor electrolytes and administer replacement therapy as ordered  Outcome: Progressing     Problem: MUSCULOSK injury  Description  INTERVENTIONS:  - Assess pt frequently for physical needs  - Identify cognitive and physical deficits and behaviors that affect risk of falls.   - [place] fall precautions as indicated by assessment.  - Educate pt/family on patient sa

## 2020-07-10 NOTE — PHYSICAL THERAPY NOTE
PHYSICAL THERAPY TREATMENT NOTE - INPATIENT     Room Number: 552/552-A       Presenting Problem: Pt admitted t ER w/ progressive weakness, LT knee pain and inabilit to walk for few weeks    Problem List  Principal Problem:    Myopathy  Active Problems: environment upon D/C from the hospital. Anticipate patient will benefit from continued skilled physical therapy while in the hospital and upon D/C from the hospital at a rehab facility.  The patient's Approx Degree of Impairment: 50.57% has been calculated A Little     AM-PAC Score:  Raw Score: 17   Approx Degree of Impairment: 50.57%   Standardized Score (AM-PAC Scale): 42.13   CMS Modifier (G-Code): CK    FUNCTIONAL ABILITY STATUS  Gait Assessment   Gait Assistance: Contact guard assist  Distance (ft): 100

## 2020-07-10 NOTE — PLAN OF CARE
Discharge planning  - pt is medically stable for discharge  - med rec completed  - insurance denied Acute Care, Peer 2 peer being done 07/09/2020  - Peer to Peer denied  - awaiting recommendations from [place] for SNF  - will need insurance approval  - PT/OT

## 2020-07-10 NOTE — CM/SW NOTE
Addendum: Pt is accepting of [facility]. They are initating auth. Regis Rebeca is the contact at the facility [phone]. SW met with pt and provided  SNF list of accepting facilities. Pt to review with wife and will f/u with choice.     SW/CM to remain avail

## 2020-07-10 NOTE — PROGRESS NOTES
[facility] Cardiology Progress Note      Orvis Rule Patient Status:  Inpatient    3/22/1955 MRN [id number]   Location [facility] 5SW/SE Attending Roosvelt Habermann, MD   Hosp Day # 8 PCP Martha Jaimes MD     Subjective:    Mello Lott 8.0 7.4 6.8   HGB 14.2 13.3 13.5   .0 191.0 225.0       Recent Labs   Lab 07/04/20  0741 07/06/20  0656 07/09/20  0702    139 141   K 4.2 4.3 4.2    109 109   CO2 28.0 23.0 28.0   BUN 17 23* 17   CREATSERUM 1.03 0.85 1.05   GLU 99 104* 9 suggested to demonstrate stability. 0.6 cm pulmonary nodule within the right upper lobe is indeterminate. This can also be assessed for resolution on subsequent follow-up imaging.        Borderline mediastinal lymphadenopathy is new since 7/12/2013 and

## 2020-07-11 LAB
ANA NUCLEOLAR TITR SER IF: 160 {TITER}
INR BLD: 2.23 (ref 0.9–1.2)
PROTHROMBIN TIME: 24.3 SECONDS (ref 11.8–14.5)

## 2020-07-11 PROCEDURE — 99232 SBSQ HOSP IP/OBS MODERATE 35: CPT | Performed by: INTERNAL MEDICINE

## 2020-07-11 PROCEDURE — 99232 SBSQ HOSP IP/OBS MODERATE 35: CPT | Performed by: HOSPITALIST

## 2020-07-11 NOTE — PROGRESS NOTES
[facility] Cardiology Progress Note      Olinda Milner Patient Status:  Inpatient    3/22/1955 MRN [id number]   Location [facility] 5SW/SE Attending John Paul Mckeon MD   Hosp Day # 9 PCP Yehuda Soulier, MD     Subjective:    Venessa Koo 191.0 225.0       Recent Labs   Lab 07/06/20  0656 07/09/20  0702    141   K 4.3 4.2    109   CO2 23.0 28.0   BUN 23* 17   CREATSERUM 0.85 1.05   * 98       Recent Labs   Lab 07/09/20  0702 07/10/20  0629 07/11/20  0709   INR 2.50* 2.61* indeterminate. This can also be assessed for resolution on subsequent follow-up imaging. Borderline mediastinal lymphadenopathy is new since 7/12/2013 and is likely reactive. This can also be assessed for resolution on subsequent follow-up imaging.

## 2020-07-11 NOTE — PLAN OF CARE
Pt denies sob, chills, fever. Pain controlled with medication. Medication reviewed. VS monitored. Safety maintained. We will continue to monitor.    Problem: Patient/Family Goals  Goal: Patient/Family Long Term Goal  Description  Patient's Long Term Goal: t and heart rate control medications as ordered  - Initiate emergency measures for life threatening arrhythmias  - Monitor electrolytes and administer replacement therapy as ordered  Outcome: Progressing     Problem: MUSCULOSKELETAL - ADULT  Goal: Return mob frequently for physical needs  - Identify cognitive and physical deficits and behaviors that affect risk of falls.   - [place] fall precautions as indicated by assessment.  - Educate pt/family on patient safety including physical limitations  - Instruct p

## 2020-07-11 NOTE — PROGRESS NOTES
[facility] HOSP - [facility]    Progress Note    Maryjo Shetty Patient Status:  Inpatient    3/22/1955 MRN [id number]   Location [facility] 5SW/SE Attending Cristobal Liao MD   Hosp Day # 9 PCP Real Ford MD        Subjective:     Cherylene Boston prednisone.     3. Left knee improved.     Jerry Murillo MD               Results:     Lab Results   Component Value Date    WBC 6.8 07/09/2020    HGB 13.5 07/09/2020    HCT 42.1 07/09/2020    .0 07/09/2020    CREATSERUM 1.05 07/09/2020    BUN 17

## 2020-07-11 NOTE — PROGRESS NOTES
[facility] HOSP - [facility]    Progress Note    Mary Grace Ryankinza Patient Status:  Inpatient    3/22/1955 MRN [id number]   [facility] 5SW/SE Attending Ronan Sanders MD   Hosp Day # 9 PCP Salomón Gr MD       Subjective:   Moisés sulfate, acetaminophen **OR** HYDROcodone-acetaminophen **OR** HYDROcodone-acetaminophen, ondansetron HCl, Metoclopramide HCl    Results:     Recent Labs   Lab 07/06/20  0656 07/09/20  0702   RBC 4.48 4.58   HGB 13.3 13.5   HCT 41.9 42.1   MCV 93.5 91.9 7/02/2020 at 3:25 PM          Ct Pelvis (cpt=72192)    Result Date: 7/4/2020  CONCLUSION:  1. No evidence of hematoma or other acute finding. 2. Colonic diverticulosis. 3. Atherosclerotic vascular calcification.  4. Small fat containing right inguinal herni fatty replacement. Dictated by (CST): Jaren Robb MD on 7/04/2020 at 3:57 PM     Finalized by (CST): Jaren Robb MD on 7/04/2020 at 4:06 PM          Mri Brain (cpt=70551)    Result Date: 7/6/2020  CONCLUSION:   1.  Moderate diffuse cerebral atrophy Date: 7/2/2020  CONCLUSION:   1. L5-S1:  Moderate right and mild left neural foraminal stenosis. 2. L4-L5:  Mild right neural foraminal and minimal bilateral lateral recess stenosis. 3. Mild lumbar dextroscoliosis.   4. Infrarenal abdominal aortic aneurysm abdomen or pelvis. Aneurysmal dilation of the infrarenal abdominal aorta, new since 7/12/2013. Yearly surveillance could be performed if intervention is deferred. Diverticulosis without diverticulitis.        Multiple other incidental findings as descri stable to discharge to rehab     VTE P: on coumadin

## 2020-07-12 LAB
INR BLD: 2.58 (ref 0.9–1.2)
PROTHROMBIN TIME: 27.3 SECONDS (ref 11.8–14.5)

## 2020-07-12 PROCEDURE — 99232 SBSQ HOSP IP/OBS MODERATE 35: CPT | Performed by: HOSPITALIST

## 2020-07-12 NOTE — PROGRESS NOTES
[facility] - [facility]    Progress Note    Samuel Tenorio Patient Status:  Inpatient    3/22/1955 MRN [id number]   Location [facility] 5SW/SE Attending Eren Lowe MD   Hosp Day # 10 PCP Pratibha Bernstein MD       Subjective:   Pratik Shelter morphINE sulfate **OR** morphINE sulfate, acetaminophen **OR** HYDROcodone-acetaminophen **OR** HYDROcodone-acetaminophen, ondansetron HCl, Metoclopramide HCl    Results:     Recent Labs   Lab 07/06/20  0656 07/09/20  0702   RBC 4.48 4.58   HGB 13.3 13.5 (CST): Darrell Trammell MD on 7/02/2020 at 3:25 PM          Ct Pelvis (cpt=72192)    Result Date: 7/4/2020  CONCLUSION:  1. No evidence of hematoma or other acute finding. 2. Colonic diverticulosis. 3. Atherosclerotic vascular calcification.  4. Small fat longus muscle strain with partial fatty replacement. Dictated by (CST): Brea Norris MD on 7/04/2020 at 3:57 PM     Finalized by (CST): Brea Norris MD on 7/04/2020 at 4:06 PM          Mri Brain (cpt=70551)    Result Date: 7/6/2020  CONCLUSION:   1. Lumbar (cpt=72148)    Result Date: 7/2/2020  CONCLUSION:   1. L5-S1:  Moderate right and mild left neural foraminal stenosis. 2. L4-L5:  Mild right neural foraminal and minimal bilateral lateral recess stenosis. 3. Mild lumbar dextroscoliosis.   4. Infrare disease within the chest, abdomen or pelvis. Aneurysmal dilation of the infrarenal abdominal aorta, new since 7/12/2013. Yearly surveillance could be performed if intervention is deferred. Diverticulosis without diverticulitis.        Multiple other inc SW assisting  - medically stable to discharge to rehab     VTE P: on coumadin

## 2020-07-12 NOTE — PLAN OF CARE
Patient A/Ox4, VSS. Patient declined pain meds. Sat in chair for short time, back to bed and slept most of afternoon. Waiting on VICK placement for D/C.      Problem: Patient/Family Goals  Goal: Patient/Family Long Term Goal  Description  Patient's Long Term antiarrhythmic and heart rate control medications as ordered  - Initiate emergency measures for life threatening arrhythmias  - Monitor electrolytes and administer replacement therapy as ordered  Outcome: Progressing     Problem: MUSCULOSKELETAL - ADULT  G Manage/alleviate anxiety  - Utilize distraction and/or relaxation techniques  - Monitor for opioid side effects  - Notify MD/LIP if interventions unsuccessful or patient reports new pain  - Anticipate increased pain with activity and pre-medicate as approp

## 2020-07-12 NOTE — PLAN OF CARE
Problem: Patient/Family Goals  Goal: Patient/Family Long Term Goal  Description  Patient's Long Term Goal: to be discharged    Interventions:   -follow MD orders   -d/c planning  -sw to assist w/ d/c planning  Outcome: Progressing  Goal: Patient/Family S life threatening arrhythmias  - Monitor electrolytes and administer replacement therapy as ordered  Outcome: Progressing     Problem: MUSCULOSKELETAL - ADULT  Goal: Return mobility to safest level of function  Description  INTERVENTIONS:  - Assess patient algorithm/standards of care as needed  Outcome: Progressing     Problem: PAIN - ADULT  Goal: Verbalizes/displays adequate comfort level or patient's stated pain goal  Description  INTERVENTIONS:  - Encourage pt to monitor pain and request assistance  - Ass

## 2020-07-13 VITALS
DIASTOLIC BLOOD PRESSURE: 65 MMHG | HEART RATE: 83 BPM | BODY MASS INDEX: 35.6 KG/M2 | HEIGHT: 74 IN | OXYGEN SATURATION: 98 % | SYSTOLIC BLOOD PRESSURE: 119 MMHG | RESPIRATION RATE: 18 BRPM | WEIGHT: 277.38 LBS | TEMPERATURE: 98 F

## 2020-07-13 LAB
INR BLD: 2.23 (ref 0.9–1.2)
PROTHROMBIN TIME: 24.3 SECONDS (ref 11.8–14.5)

## 2020-07-13 PROCEDURE — 99239 HOSP IP/OBS DSCHRG MGMT >30: CPT | Performed by: HOSPITALIST

## 2020-07-13 RX ORDER — PREDNISONE 20 MG/1
20 TABLET ORAL
Qty: 14 TABLET | Refills: 0 | Status: SHIPPED | OUTPATIENT
Start: 2020-07-14 | End: 2020-08-13

## 2020-07-13 NOTE — PAYOR COMM NOTE
--------------  CONTINUED STAY REVIEW    Payor: [address] [place] [address] Road #:  [id number]  Authorization Number: [id number]           7/13 CARDS     Impression:  1. Non-ischemic cardiomyopathy with LVEF 25-30%  2.  Chronic atrial fi Dose Route User    7/13/2020 0957 Given 20 mg Oral Dveon Fang RN      Warfarin Sodium (COUMADIN) tab 2 mg     Date Action Dose Route User    7/12/2020 2038 Given 2 mg Oral Ardell Olszewski, RN

## 2020-07-13 NOTE — CM/SW NOTE
07/13/20 1500   Discharge disposition   Expected discharge disposition Skilled Nurs   Name of Facillity/Home Care/Hospice   [facility])   Patient is Discharged to a [address] [place] [address] Yes   Discharge transportation [facility]  (6:00 per [place])

## 2020-07-13 NOTE — DISCHARGE SUMMARY
[facility]    Discharge Summary    Franki De Jesus Patient Status:  Inpatient    3/22/1955 MRN [id number]   Location [facility] 5SW/SE Attending Elver Stephenson MD   [facility] Day # 11 PCP Princess Linda MD     Date of Admissio nondistended, bowel sounds present  MSK: Full range of motion in extremities  Skin: Warm and dry  Psych: Normal affect  EXTREMITIES: There was no edema, varicosities b/l. L knee swelling  NEUROLOGICAL: weakness at the level of the hips B/L.       History o possible ICD     Chronic a-fib  - rate controlled  - digoxin 125mcg daily  - EKG  - coreg  - INR therapeutic     OA  - stable     Morbid Obesity Body mass index is 35.82 kg/m².   - diet and exercise     MITCHEL  - refused CPAP  - melatonin     Constipation  - M Crea              Where to Get Your Medications      Please  your prescriptions at the location directed by your doctor or nurse    Bring a paper prescription for each of these medications  · digoxin 0.125 MG Tabs  · predniSONE 20 MG Tabs

## 2020-07-13 NOTE — OCCUPATIONAL THERAPY NOTE
OCCUPATIONAL THERAPY TREATMENT NOTE - INPATIENT        Room Number: 552/552-A           Presenting Problem: myopathy    Problem List  Principal Problem:    Myopathy  Active Problems:    Chronic pain of left knee    Internal derangement of right shoulder here\"    OBJECTIVE  Precautions: Limb alert - left;Bed/chair alarm    WEIGHT BEARING RESTRICTION  Weight Bearing Restriction: None        R Lower Extremity: Weight Bearing as Tolerated  L Lower Extremity: Weight Bearing as Tolerated    PAIN ASSESSMENT  Ra concerns addressed; Alarm set    OT Goals:     Patients self stated goal is: To figure out what he diagnosis is      Patient will complete functional transfer with MOD I  Comment: ongoing  - SBA    Patient will complete toileting with MOD I  Comment: NT

## 2020-07-13 NOTE — PROGRESS NOTES
[facility]    Assessment and Plan:        Impression:  1. Non-ischemic cardiomyopathy with LVEF 25-30%  2. Chronic atrial fibrillation; inr therapeutic   3. Weakness  4. Infrarenal AAA, 3.7cm noted on CT scan  5.  Knee pain; rheum note a

## 2020-07-13 NOTE — PHYSICAL THERAPY NOTE
PHYSICAL THERAPY TREATMENT NOTE - INPATIENT     Room Number: 552/552-A       Presenting Problem: Pt admitted t ER w/ progressive weakness, LT knee pain and inabilit to walk for few weeks    Problem List  Principal Problem:    Myopathy  Active Problems: improved in last few days with current acute management. At end of session , pt left up in chair , needs within reach , report to RN , alarm set. Therapy did reinforce pt to use call light and have assisted mobility at this time.        The patient's have seen previous data.)    PAIN ASSESSMENT   Ratin  Location: left anterior knee and right shoulder  generalized pain   Management Techniques: Activity promotion; Body mechanics; Relaxation;Repositioning    BALANCE #1 Patient is able to demonstrate supine - sit EOB @ level:mod a via log roll from flat bed.    Goal #1   Current Status SBA    Goal #2 Patient is able to demonstrate transfers sit <-> stand w/ RW with minimal assistance   Goal #2  Current Status SBA cues

## 2020-07-13 NOTE — CM/SW NOTE
Addendum 243:    Updated PT/OT uploaded via [employer] to [facility]. Insurance customer service # [phone]. Insurance will need updated PT/OT notes per [address] @ [address]IA [address]Z to remain available for support and/or discharge planning.      Melanie Orozco denies pain/discomfort

## 2020-07-13 NOTE — PLAN OF CARE
Problem: Patient/Family Goals  Goal: Patient/Family Long Term Goal  Description  Patient's Long Term Goal: to be discharged    Interventions: follow MD orders      Outcome: Progressing  Goal: Patient/Family Short Term Goal  Description  Patient's Short T administer replacement therapy as ordered  Outcome: Progressing     Problem: MUSCULOSKELETAL - ADULT  Goal: Return mobility to safest level of function  Description  INTERVENTIONS:  - Assess patient stability and activity tolerance for standing, transferri patient reports new pain  - Anticipate increased pain with activity and pre-medicate as appropriate  Outcome: Progressing     Problem: SAFETY ADULT - FALL  Goal: Free from fall injury  Description  INTERVENTIONS:  - Assess pt frequently for physical needs

## 2020-07-14 LAB
ENA SM IGG SER QL: NEGATIVE
ENA SM+RNP AB SER QL: NEGATIVE
ENA SS-A AB SER QL IA: NEGATIVE
ENA SS-B AB SER QL IA: NEGATIVE

## 2020-07-14 NOTE — PAYOR COMM NOTE
--------------  DISCHARGE REVIEW    Payor: [facility] [employer]  Subscriber #:  [id number]  Authorization Number: [id number]    Admit date: 7/2/20  Admit time:  4774  Discharge Date: 7/13/2020  6:24 PM     Admitting Physician: Kyle Dutton, therapeutic drug monitoring     Left knee pain     Myopathy     Chronic pain of left knee     Internal derangement of right shoulder     Inability to ambulate due to knee     Hamstring injury, left, initial encounter        Physical Exam:     Gen: No acute tumor - will need short term follow up per radiologist in 6 months  - MRI negative for acute stroke  - Medically stable to be DC to rehab - denied acute rehab - to [facility] today  - will need outpatient follow up with Neuro, Rheum and ortho as well as cardiolog tablet  Refills:  1     Ecotrin Low Strength 81 MG Tbec  Generic drug:  aspirin      Take 81 mg by mouth daily. Refills:  0     lisinopril 20 MG Tabs      Take 20 mg by mouth daily.  HCTZ 25mg   Refills:  0        STOP taking these medications    Acetamin

## 2020-07-20 ENCOUNTER — ANTI-COAG VISIT (OUTPATIENT)
Dept: INTERNAL MEDICINE CLINIC | Facility: CLINIC | Age: 65
End: 2020-07-20
Payer: COMMERCIAL

## 2020-07-20 DIAGNOSIS — Z79.01 LONG TERM (CURRENT) USE OF ANTICOAGULANTS: ICD-10-CM

## 2020-07-20 DIAGNOSIS — Z51.81 ENCOUNTER FOR THERAPEUTIC DRUG MONITORING: ICD-10-CM

## 2020-07-20 DIAGNOSIS — I48.20 CHRONIC ATRIAL FIBRILLATION (HCC): ICD-10-CM

## 2020-07-20 LAB
INR: 2.5 (ref 0.8–1.2)
TEST STRIP EXPIRATION DATE: ABNORMAL DATE

## 2020-07-20 PROCEDURE — 85610 PROTHROMBIN TIME: CPT

## 2020-07-20 PROCEDURE — 36416 COLLJ CAPILLARY BLOOD SPEC: CPT

## 2020-08-03 ENCOUNTER — APPOINTMENT (OUTPATIENT)
Dept: GENERAL RADIOLOGY | Facility: HOSPITAL | Age: 65
DRG: 308 | End: 2020-08-03
Attending: EMERGENCY MEDICINE
Payer: COMMERCIAL

## 2020-08-03 ENCOUNTER — HOSPITAL ENCOUNTER (INPATIENT)
Facility: HOSPITAL | Age: 65
LOS: 5 days | Discharge: HOME | DRG: 308 | End: 2020-08-08
Attending: EMERGENCY MEDICINE | Admitting: HOSPITALIST
Payer: COMMERCIAL

## 2020-08-03 DIAGNOSIS — I10 ESSENTIAL HYPERTENSION WITH GOAL BLOOD PRESSURE LESS THAN 140/90: ICD-10-CM

## 2020-08-03 DIAGNOSIS — I50.9 ACUTE ON CHRONIC CONGESTIVE HEART FAILURE, UNSPECIFIED HEART FAILURE TYPE (HCC): ICD-10-CM

## 2020-08-03 DIAGNOSIS — I48.91 ATRIAL FIBRILLATION WITH RAPID VENTRICULAR RESPONSE (HCC): Primary | ICD-10-CM

## 2020-08-03 DIAGNOSIS — R60.0 LEG EDEMA: ICD-10-CM

## 2020-08-03 LAB
ANION GAP SERPL CALC-SCNC: 7 MMOL/L (ref 0–18)
BASOPHILS # BLD AUTO: 0.03 X10(3) UL (ref 0–0.2)
BASOPHILS NFR BLD AUTO: 0.2 %
BUN BLD-MCNC: 14 MG/DL (ref 7–18)
BUN/CREAT SERPL: 16.5 (ref 10–20)
CALCIUM BLD-MCNC: 8.5 MG/DL (ref 8.5–10.1)
CHLORIDE SERPL-SCNC: 114 MMOL/L (ref 98–112)
CO2 SERPL-SCNC: 23 MMOL/L (ref 21–32)
CREAT BLD-MCNC: 0.85 MG/DL (ref 0.7–1.3)
DEPRECATED RDW RBC AUTO: 53.5 FL (ref 35.1–46.3)
DIGOXIN SERPL-MCNC: 0.11 NG/ML (ref 0.8–2)
EOSINOPHIL # BLD AUTO: 0.04 X10(3) UL (ref 0–0.7)
EOSINOPHIL NFR BLD AUTO: 0.3 %
ERYTHROCYTE [DISTWIDTH] IN BLOOD BY AUTOMATED COUNT: 16 % (ref 11–15)
GLUCOSE BLD-MCNC: 65 MG/DL (ref 70–99)
HCT VFR BLD AUTO: 42.6 % (ref 39–53)
HGB BLD-MCNC: 13.7 G/DL (ref 13–17.5)
IMM GRANULOCYTES # BLD AUTO: 0.06 X10(3) UL (ref 0–1)
IMM GRANULOCYTES NFR BLD: 0.5 %
INR BLD: 4.51 (ref 0.9–1.2)
LYMPHOCYTES # BLD AUTO: 3.07 X10(3) UL (ref 1–4)
LYMPHOCYTES NFR BLD AUTO: 24.6 %
MCH RBC QN AUTO: 30 PG (ref 26–34)
MCHC RBC AUTO-ENTMCNC: 32.2 G/DL (ref 31–37)
MCV RBC AUTO: 93.4 FL (ref 80–100)
MONOCYTES # BLD AUTO: 0.93 X10(3) UL (ref 0.1–1)
MONOCYTES NFR BLD AUTO: 7.4 %
NEUTROPHILS # BLD AUTO: 8.37 X10 (3) UL (ref 1.5–7.7)
NEUTROPHILS # BLD AUTO: 8.37 X10(3) UL (ref 1.5–7.7)
NEUTROPHILS NFR BLD AUTO: 67 %
OSMOLALITY SERPL CALC.SUM OF ELEC: 297 MOSM/KG (ref 275–295)
PLATELET # BLD AUTO: 157 10(3)UL (ref 150–450)
POTASSIUM SERPL-SCNC: 5.3 MMOL/L (ref 3.5–5.1)
PROTHROMBIN TIME: 42.2 SECONDS (ref 11.8–14.5)
RBC # BLD AUTO: 4.56 X10(6)UL (ref 3.8–5.8)
SARS-COV-2 RNA RESP QL NAA+PROBE: NOT DETECTED
SODIUM SERPL-SCNC: 144 MMOL/L (ref 136–145)
TROPONIN I SERPL-MCNC: <0.045 NG/ML (ref ?–0.04)
WBC # BLD AUTO: 12.5 X10(3) UL (ref 4–11)

## 2020-08-03 PROCEDURE — 71045 X-RAY EXAM CHEST 1 VIEW: CPT | Performed by: EMERGENCY MEDICINE

## 2020-08-03 PROCEDURE — 99223 1ST HOSP IP/OBS HIGH 75: CPT | Performed by: HOSPITALIST

## 2020-08-03 RX ORDER — LISINOPRIL AND HYDROCHLOROTHIAZIDE 25; 20 MG/1; MG/1
1 TABLET ORAL DAILY
Status: ON HOLD | COMMUNITY
End: 2020-08-05

## 2020-08-03 RX ORDER — DILTIAZEM HYDROCHLORIDE 5 MG/ML
INJECTION INTRAVENOUS
Status: COMPLETED
Start: 2020-08-03 | End: 2020-08-03

## 2020-08-03 RX ORDER — ASPIRIN 81 MG/1
81 TABLET ORAL DAILY
Status: DISCONTINUED | OUTPATIENT
Start: 2020-08-03 | End: 2020-08-08

## 2020-08-03 RX ORDER — METOCLOPRAMIDE HYDROCHLORIDE 5 MG/ML
10 INJECTION INTRAMUSCULAR; INTRAVENOUS EVERY 8 HOURS PRN
Status: DISCONTINUED | OUTPATIENT
Start: 2020-08-03 | End: 2020-08-08

## 2020-08-03 RX ORDER — LISINOPRIL AND HYDROCHLOROTHIAZIDE 25; 20 MG/1; MG/1
1 TABLET ORAL DAILY
Status: DISCONTINUED | OUTPATIENT
Start: 2020-08-04 | End: 2020-08-03

## 2020-08-03 RX ORDER — SODIUM CHLORIDE 0.9 % (FLUSH) 0.9 %
10 SYRINGE (ML) INJECTION AS NEEDED
Status: DISCONTINUED | OUTPATIENT
Start: 2020-08-03 | End: 2020-08-08

## 2020-08-03 RX ORDER — METOPROLOL TARTRATE 5 MG/5ML
5 INJECTION INTRAVENOUS ONCE
Status: COMPLETED | OUTPATIENT
Start: 2020-08-03 | End: 2020-08-03

## 2020-08-03 RX ORDER — DILTIAZEM HYDROCHLORIDE 5 MG/ML
15 INJECTION INTRAVENOUS ONCE
Status: COMPLETED | OUTPATIENT
Start: 2020-08-03 | End: 2020-08-03

## 2020-08-03 RX ORDER — ATORVASTATIN CALCIUM 10 MG/1
10 TABLET, FILM COATED ORAL NIGHTLY
COMMUNITY
End: 2020-10-21

## 2020-08-03 RX ORDER — SODIUM CHLORIDE 0.9 % (FLUSH) 0.9 %
3 SYRINGE (ML) INJECTION AS NEEDED
Status: DISCONTINUED | OUTPATIENT
Start: 2020-08-03 | End: 2020-08-08

## 2020-08-03 RX ORDER — ONDANSETRON 2 MG/ML
4 INJECTION INTRAMUSCULAR; INTRAVENOUS EVERY 6 HOURS PRN
Status: DISCONTINUED | OUTPATIENT
Start: 2020-08-03 | End: 2020-08-08

## 2020-08-03 RX ORDER — ATORVASTATIN CALCIUM 10 MG/1
10 TABLET, FILM COATED ORAL NIGHTLY
Status: DISCONTINUED | OUTPATIENT
Start: 2020-08-03 | End: 2020-08-08

## 2020-08-03 RX ORDER — PREDNISONE 20 MG/1
20 TABLET ORAL
Status: DISCONTINUED | OUTPATIENT
Start: 2020-08-04 | End: 2020-08-08

## 2020-08-03 RX ORDER — CARVEDILOL 25 MG/1
25 TABLET ORAL 2 TIMES DAILY WITH MEALS
Status: DISCONTINUED | OUTPATIENT
Start: 2020-08-03 | End: 2020-08-05

## 2020-08-03 RX ORDER — FUROSEMIDE 10 MG/ML
40 INJECTION INTRAMUSCULAR; INTRAVENOUS ONCE
Status: COMPLETED | OUTPATIENT
Start: 2020-08-03 | End: 2020-08-03

## 2020-08-03 RX ORDER — DIGOXIN 0.25 MG/ML
250 INJECTION INTRAMUSCULAR; INTRAVENOUS EVERY 6 HOURS
Status: COMPLETED | OUTPATIENT
Start: 2020-08-03 | End: 2020-08-04

## 2020-08-03 RX ORDER — FUROSEMIDE 10 MG/ML
40 INJECTION INTRAMUSCULAR; INTRAVENOUS
Status: DISCONTINUED | OUTPATIENT
Start: 2020-08-03 | End: 2020-08-05

## 2020-08-03 RX ORDER — NITROGLYCERIN 0.4 MG/1
0.4 TABLET SUBLINGUAL EVERY 5 MIN PRN
Status: DISCONTINUED | OUTPATIENT
Start: 2020-08-03 | End: 2020-08-08

## 2020-08-03 RX ORDER — WARFARIN SODIUM 5 MG/1
5 TABLET ORAL DAILY
Status: DISCONTINUED | OUTPATIENT
Start: 2020-08-03 | End: 2020-08-03

## 2020-08-03 RX ORDER — ACETAMINOPHEN 325 MG/1
650 TABLET ORAL EVERY 6 HOURS PRN
Status: DISCONTINUED | OUTPATIENT
Start: 2020-08-03 | End: 2020-08-08

## 2020-08-03 NOTE — ED PROVIDER NOTES
Patient Seen in: [facility] Department    History   Patient presents with:  Abnormal Result    Stated Complaint: pt sent by md for admission     HPI    Patient sent in from cardiologist office for rate control and admission.   Patient has internal fixation (Bilateral wrist fractures)       Social History    Tobacco Use      Smoking status: Former Smoker        Years: 20.00        Quit date: 2000        Years since quittin.5      Smokeless tobacco: Never Used      Tobacco comment: perfusion. Chronic appearing edema of the lower extremities  Respiratory:   Mildly increased respiratory rate no obvious respiratory distress soft rales at the bases  Abdomen:  Soft, non-tender, non-distended. No masses, no hepato-splenomegaly.     Muscul WITH PLATELET    Narrative: The following orders were created for panel order CBC WITH DIFFERENTIAL WITH PLATELET.   Procedure                               Abnormality         Status                     ---------                               ---------

## 2020-08-03 NOTE — ED NOTES
Orders for admission, patient is aware of plan and ready to go upstairs. Any questions, please call ED RN Kristal at extension [phone]. Patient is alert, oriented x4. Uses glasses.   On 2L nasal cannula per patient request.

## 2020-08-03 NOTE — ED NOTES
Dr. Jacky Mensah aware patient's heart rate starting to get higher into 130's. Verbal order for metoprolol 5mg IVP stat. Patient medicated as ordered. Will continue to monitor.

## 2020-08-03 NOTE — PLAN OF CARE
Patient received as an admission form ED. AFIB on tele with rates in the 110-120's. On cardizem gtt infusing at 10mg/hr. BP stable. Digoxin IV given. Lasix IV given for CHF. BLE edema present and presenting c/o shortness of breath.  Resting comfortably at t edema, trend weights  Outcome: Progressing  Goal: Absence of cardiac arrhythmias or at baseline  Description  INTERVENTIONS:  - Continuous cardiac monitoring, monitor vital signs, obtain 12 lead EKG if indicated  - Evaluate effectiveness of antiarrhythmic specific gravity, serum osmolarity and serum sodium as indicated or ordered  - Monitor response to interventions for patient's volume status, including labs, urine output, blood pressure (other measures as available)  - Encourage oral intake as appropriate

## 2020-08-03 NOTE — ED NOTES
Assumed care of patient from triage. patient to ED from home for SOB. Patient states \"I didn't feel right,\" and that he had a harder time breathing since yesterday. Patient states that it feels like when his atrial fibrillation \"acts up. \" Patient denie

## 2020-08-03 NOTE — H&P
[facility]    PATIENT'S NAME: Cecichristiano Merchant WALTON   ATTENDING PHYSICIAN: Regla Ortiz MD   PATIENT ACCOUNT#:   [id number]    LOCATION:  [place] [address]  MEDICAL RECORD #:   [id number]       YOB: 1955  ADMISSION DATE:       08/03/20 daily living at baseline. He had history of alcohol abuse, but does not drink any more. REVIEW OF SYSTEMS:  Progressive leg swelling, dyspnea on exertion, and orthopnea for the last 3 to 4 days. The patient does not follow a low-salt diet.   He is not

## 2020-08-04 LAB
ANION GAP SERPL CALC-SCNC: 3 MMOL/L (ref 0–18)
BASOPHILS # BLD AUTO: 0.04 X10(3) UL (ref 0–0.2)
BASOPHILS NFR BLD AUTO: 0.4 %
BUN BLD-MCNC: 11 MG/DL (ref 7–18)
BUN/CREAT SERPL: 11.3 (ref 10–20)
CALCIUM BLD-MCNC: 8.6 MG/DL (ref 8.5–10.1)
CHLORIDE SERPL-SCNC: 110 MMOL/L (ref 98–112)
CO2 SERPL-SCNC: 29 MMOL/L (ref 21–32)
CREAT BLD-MCNC: 0.97 MG/DL (ref 0.7–1.3)
DEPRECATED RDW RBC AUTO: 52.9 FL (ref 35.1–46.3)
EOSINOPHIL # BLD AUTO: 0.05 X10(3) UL (ref 0–0.7)
EOSINOPHIL NFR BLD AUTO: 0.6 %
ERYTHROCYTE [DISTWIDTH] IN BLOOD BY AUTOMATED COUNT: 15.9 % (ref 11–15)
GLUCOSE BLD-MCNC: 96 MG/DL (ref 70–99)
HAV IGM SER QL: 2.3 MG/DL (ref 1.6–2.6)
HCT VFR BLD AUTO: 40.9 % (ref 39–53)
HGB BLD-MCNC: 13.2 G/DL (ref 13–17.5)
IMM GRANULOCYTES # BLD AUTO: 0.03 X10(3) UL (ref 0–1)
IMM GRANULOCYTES NFR BLD: 0.3 %
INR BLD: 3.72 (ref 0.9–1.2)
LYMPHOCYTES # BLD AUTO: 2.15 X10(3) UL (ref 1–4)
LYMPHOCYTES NFR BLD AUTO: 23.9 %
MCH RBC QN AUTO: 29.9 PG (ref 26–34)
MCHC RBC AUTO-ENTMCNC: 32.3 G/DL (ref 31–37)
MCV RBC AUTO: 92.5 FL (ref 80–100)
MONOCYTES # BLD AUTO: 0.61 X10(3) UL (ref 0.1–1)
MONOCYTES NFR BLD AUTO: 6.8 %
NEUTROPHILS # BLD AUTO: 6.11 X10 (3) UL (ref 1.5–7.7)
NEUTROPHILS # BLD AUTO: 6.11 X10(3) UL (ref 1.5–7.7)
NEUTROPHILS NFR BLD AUTO: 68 %
OSMOLALITY SERPL CALC.SUM OF ELEC: 293 MOSM/KG (ref 275–295)
PLATELET # BLD AUTO: 139 10(3)UL (ref 150–450)
POTASSIUM SERPL-SCNC: 3.6 MMOL/L (ref 3.5–5.1)
POTASSIUM SERPL-SCNC: 4.2 MMOL/L (ref 3.5–5.1)
PROTHROMBIN TIME: 36.3 SECONDS (ref 11.8–14.5)
RBC # BLD AUTO: 4.42 X10(6)UL (ref 3.8–5.8)
SODIUM SERPL-SCNC: 142 MMOL/L (ref 136–145)
WBC # BLD AUTO: 9 X10(3) UL (ref 4–11)

## 2020-08-04 PROCEDURE — 99233 SBSQ HOSP IP/OBS HIGH 50: CPT | Performed by: NURSE PRACTITIONER

## 2020-08-04 RX ORDER — POTASSIUM CHLORIDE 20 MEQ/1
40 TABLET, EXTENDED RELEASE ORAL EVERY 4 HOURS
Status: COMPLETED | OUTPATIENT
Start: 2020-08-04 | End: 2020-08-04

## 2020-08-04 RX ORDER — DIGOXIN 125 MCG
125 TABLET ORAL DAILY
Status: DISCONTINUED | OUTPATIENT
Start: 2020-08-05 | End: 2020-08-08

## 2020-08-04 RX ORDER — SPIRONOLACTONE 25 MG/1
25 TABLET ORAL DAILY
Status: DISCONTINUED | OUTPATIENT
Start: 2020-08-04 | End: 2020-08-08

## 2020-08-04 NOTE — PLAN OF CARE
VS stable. AFIB on tele - rates better. Cardizem gtt stopped per cardiology. On Digoxin, Coreg, and PO cardizem. Lasix IV  BID - good urine output. Fluid restriction/daily weights/symptom recognition discussed with patient. Bleeding prec. INR 3.72 today.  C artery perfusion - ex.  Angina  - Evaluate fluid balance, assess for edema, trend weights  Outcome: Progressing  Goal: Absence of cardiac arrhythmias or at baseline  Description  INTERVENTIONS:  - Continuous cardiac monitoring, monitor vital signs, obtain 1 deficit  - Monitor intake, output and patient weight  - Monitor urine specific gravity, serum osmolarity and serum sodium as indicated or ordered  - Monitor response to interventions for patient's volume status, including labs, urine output, blood pressure

## 2020-08-04 NOTE — RESPIRATORY THERAPY NOTE
MITCHEL ASSESSMENT:    Pt does have a previous diagnosis of MITCHEL. Pt does routinely use a CPAP device at home. This pt is suspected to be at high risk for MITCHEL and sleep lab packet was not provided to patient for outpatient follow-up.     CPAP INITIATION:    Pt t

## 2020-08-04 NOTE — PROGRESS NOTES
[facility]    Progress Note    Bandar Ortega Patient Status:  Inpatient    3/22/1955 MRN [id number]   Location [facility] 3W/SW Attending Brad Robbins MD   Hosp Day # 1 PCP Aarti Lomeli MD        Subjective:   Jordan Szymanski 08/04/2020    CO2 29.0 08/04/2020    GLU 96 08/04/2020    CA 8.6 08/04/2020    ALB 2.51 (L) 07/06/2020    ALKPHO 73 11/25/2019    BILT 0.8 11/25/2019    TP 5.8 (L) 07/06/2020    AST 17 11/25/2019    ALT 23 11/25/2019    INR 3.72 (H) 08/04/2020    PT 37.8 (

## 2020-08-04 NOTE — PROGRESS NOTES
[facility]           8/4/2020    Assessment and Plan:   1. Atrial fibrillation with RVR; on Coumadn - INR supratherapeutic  2. Acute on Chronic HFrEF; net neg 4.8L, weight -6lbs, baseline ~277lbs  3.  Dilated Cardiomyopathy; Echo 7/2/20 1. Cardiomegaly. Prominent central pulmonary vasculature. 2. Mild basilar pulmonary interstitial prominence may reflect mild interstitial congestion 3. No alveolar consolidation or pleural effusion.     Dictated by (CST): Elsa Syed MD on 8/03/20

## 2020-08-04 NOTE — PAYOR COMM NOTE
--------------  Appropriate for inpatient status per guidelines for SOB due to CHF and AF/RVR with elevated INR. Hx cardiomyopathy with EF 20-25%.         ADMISSION REVIEW     Payor: [facility] MEDICAL RESOURCES CHOICE PLUS  Subscriber #:  [id number]  Ivett Posey HERNIA SURGERY  2005 2005-hernia repair   • LIPOMA REMOVAL      Excision lipoma of back   • OTHER SURGICAL HISTORY      jered legs vein stripping   • OTHER SURGICAL HISTORY  1993    Bilateral vein stripping   • TONSILLECTOMY     • UMBILICAL HERNIA REPAIR rate did improve did start to come back up a bit but the drip had stopped nurses resuming it we will give IV metoprolol as well. Patient was evaluated by Dr. Tangela Paredes, he accepted patient for admission had been in contact with cardiologist as well.   INR a (primary encounter diagnosis)  Acute on chronic congestive heart failure, unspecified heart failure type ([facility] [address]    Disposition:  There is no disposition on file for this visit.                  H&P - H&P Note      CHIEF COMPLAINT:  Atrial fibrillation with rate 30, blood pressure 106/85, pulse ox 100% on room air. HEENT:  Atraumatic. Oropharynx clear, moist mucous membranes. Normal hard and soft palate. Eyes:  Anicteric sclerae. Pupils equal, round, reactive. NECK:  Supple. No lymphadenopathy.   Harlen Jamaican fibrillation with rapid ventricular response  - IV Cardizem- transition to po   - Continue Coreg  - INR 3.7 today, hold coumadin tonight   - Cardiology on consult     Acute systolic heart failure  -Continue lasix 40mg IV BID  - 6lbs lost so far  - Holding 08/03/20 1241 97.8 °F (36.6 °C) 132Abnormal  30Abnormal  164/150Abnormal  99 % 295 lb None (Room air)

## 2020-08-04 NOTE — CM/SW NOTE
Per RN rounds, pt is from home w/ his wife and has no anticipated d/c needs. Received MDO for Entresto. SW contacted pt's RN and D/C RN Nila Muñiz requesting notice when [facility] is avail.     SW/CM will need Entresto script to run pt's benefits/ins

## 2020-08-04 NOTE — PLAN OF CARE
Problem: Patient Centered Care  Goal: Patient preferences are identified and integrated in the patient's plan of care  Description  Interventions:  - What would you like us to know as we care for you?  \"I love to cook\"  - Provide timely, complete, and a Initiate emergency measures for life threatening arrhythmias  - Monitor electrolytes and administer replacement therapy as ordered  Outcome: Progressing     Problem: RESPIRATORY - ADULT  Goal: Achieves optimal ventilation and oxygenation  Description  INTE restrictions as appropriate  Outcome: Progressing   Patient alert and oriented x4. 2L prn and cpap at night. Afib 90-110s on monitor overnight, cardizem gtt infusing, cardizem po and digoxin as ordered. FR maintained. Will continue to monitor.

## 2020-08-05 LAB
ANION GAP SERPL CALC-SCNC: 5 MMOL/L (ref 0–18)
BASOPHILS # BLD AUTO: 0.03 X10(3) UL (ref 0–0.2)
BASOPHILS NFR BLD AUTO: 0.3 %
BUN BLD-MCNC: 17 MG/DL (ref 7–18)
BUN/CREAT SERPL: 13.8 (ref 10–20)
CALCIUM BLD-MCNC: 9.5 MG/DL (ref 8.5–10.1)
CHLORIDE SERPL-SCNC: 104 MMOL/L (ref 98–112)
CO2 SERPL-SCNC: 32 MMOL/L (ref 21–32)
CREAT BLD-MCNC: 1.23 MG/DL (ref 0.7–1.3)
DEPRECATED RDW RBC AUTO: 51.2 FL (ref 35.1–46.3)
EOSINOPHIL # BLD AUTO: 0.04 X10(3) UL (ref 0–0.7)
EOSINOPHIL NFR BLD AUTO: 0.4 %
ERYTHROCYTE [DISTWIDTH] IN BLOOD BY AUTOMATED COUNT: 15.6 % (ref 11–15)
GLUCOSE BLD-MCNC: 96 MG/DL (ref 70–99)
HCT VFR BLD AUTO: 45.5 % (ref 39–53)
HGB BLD-MCNC: 14.6 G/DL (ref 13–17.5)
IMM GRANULOCYTES # BLD AUTO: 0.04 X10(3) UL (ref 0–1)
IMM GRANULOCYTES NFR BLD: 0.4 %
INR BLD: 3.03 (ref 0.9–1.2)
LYMPHOCYTES # BLD AUTO: 2.43 X10(3) UL (ref 1–4)
LYMPHOCYTES NFR BLD AUTO: 24.6 %
MCH RBC QN AUTO: 29.5 PG (ref 26–34)
MCHC RBC AUTO-ENTMCNC: 32.1 G/DL (ref 31–37)
MCV RBC AUTO: 91.9 FL (ref 80–100)
MONOCYTES # BLD AUTO: 0.7 X10(3) UL (ref 0.1–1)
MONOCYTES NFR BLD AUTO: 7.1 %
NEUTROPHILS # BLD AUTO: 6.63 X10 (3) UL (ref 1.5–7.7)
NEUTROPHILS # BLD AUTO: 6.63 X10(3) UL (ref 1.5–7.7)
NEUTROPHILS NFR BLD AUTO: 67.2 %
OSMOLALITY SERPL CALC.SUM OF ELEC: 293 MOSM/KG (ref 275–295)
PLATELET # BLD AUTO: 156 10(3)UL (ref 150–450)
POTASSIUM SERPL-SCNC: 4.3 MMOL/L (ref 3.5–5.1)
PROTHROMBIN TIME: 30.9 SECONDS (ref 11.8–14.5)
RBC # BLD AUTO: 4.95 X10(6)UL (ref 3.8–5.8)
SODIUM SERPL-SCNC: 141 MMOL/L (ref 136–145)
WBC # BLD AUTO: 9.9 X10(3) UL (ref 4–11)

## 2020-08-05 PROCEDURE — 99233 SBSQ HOSP IP/OBS HIGH 50: CPT | Performed by: HOSPITALIST

## 2020-08-05 RX ORDER — WARFARIN SODIUM 5 MG/1
5 TABLET ORAL NIGHTLY
Status: DISCONTINUED | OUTPATIENT
Start: 2020-08-05 | End: 2020-08-06

## 2020-08-05 RX ORDER — CARVEDILOL 12.5 MG/1
12.5 TABLET ORAL ONCE
Status: COMPLETED | OUTPATIENT
Start: 2020-08-05 | End: 2020-08-05

## 2020-08-05 NOTE — PLAN OF CARE
VS stable. HR elevated to the 150's with activity. 80-90s AFIB at rest. Denies chest pain or sob. Independent with ADLs. Entresto to start tonight. Possible discharge tomorrow.     Problem: Patient Centered Care  Goal: Patient preferences are identified and baseline  Description  INTERVENTIONS:  - Continuous cardiac monitoring, monitor vital signs, obtain 12 lead EKG if indicated  - Evaluate effectiveness of antiarrhythmic and heart rate control medications as ordered  - Initiate emergency measures for life t response to interventions for patient's volume status, including labs, urine output, blood pressure (other measures as available)  - Encourage oral intake as appropriate  - Instruct patient on fluid and nutrition restrictions as appropriate  Outcome: Progr

## 2020-08-05 NOTE — CARDIAC REHAB
CARDIAC REHAB HEART FAILURE EDUCATION    Handouts provided and reviewed: CHF Booklet. Activity: Chair for all meals: yes       Ambulation: yes       Tolerated Activity: yes         Disease Process: Disease process reviewed. Questions answered.  Patient

## 2020-08-05 NOTE — CM/SW NOTE
Received notice from D/C RN Nathanael Torres - pt's insurance coverage/benefits run for Cat. Nathanael Torres documented pt's coverage. No other needs anticipated at d/c.    SW/CM to remain available for support and/or discharge planning.        SHANNON Vernon

## 2020-08-05 NOTE — DISCHARGE PLANNING
[address] [phone]    I called patient's pharmacy listed above and called in patient's prescription for Entresto 24-26 for 30 days supply and was told that the patient's copay will be $35 after his insurance

## 2020-08-05 NOTE — PLAN OF CARE
Problem: Patient Centered Care  Goal: Patient preferences are identified and integrated in the patient's plan of care  Description  Interventions:  - What would you like us to know as we care for you?  \"I love to cook\"  - Provide timely, complete, and a ordered  - Initiate emergency measures for life threatening arrhythmias  - Monitor electrolytes and administer replacement therapy as ordered  Outcome: Progressing     Problem: RESPIRATORY - ADULT  Goal: Achieves optimal ventilation and oxygenation  Descri nutrition restrictions as appropriate  Outcome: Progressing   Patient alert and oriented x4. Afib on monitor, cardizem q6h. Urinal at bedside. FR maintained. Coumadin on hold. Will continue to monitor.

## 2020-08-05 NOTE — PROGRESS NOTES
[facility]           8/4/2020    Assessment and Plan:   1. Atrial fibrillation with RVR; on Coumadn - INR supratherapeutic; rates better except with exertions so will try to control rates with po coreg and digoxin  2.  Acute on Chronic H (cpt=71045)    Result Date: 8/3/2020  CONCLUSION:  1. Cardiomegaly. Prominent central pulmonary vasculature. 2. Mild basilar pulmonary interstitial prominence may reflect mild interstitial congestion 3. No alveolar consolidation or pleural effusion.     D

## 2020-08-05 NOTE — PROGRESS NOTES
[facility]    Progress Note    Kendall Gamez Patient Status:  Inpatient    3/22/1955 MRN [id number]   Location [facility] 3W/SW Attending Andria Sparks MD   Hosp Day # 2 PCP Yamini Farmer MD        Subjective:   Ling [address] 141 08/05/2020    K 4.3 08/05/2020     08/05/2020    CO2 32.0 08/05/2020    GLU 96 08/05/2020    CA 9.5 08/05/2020    ALB 2.51 (L) 07/06/2020    ALKPHO 73 11/25/2019    BILT 0.8 11/25/2019    TP 5.8 (L) 07/06/2020    AST 17 11/25/2019    ALT 23 11/25

## 2020-08-06 LAB
ANION GAP SERPL CALC-SCNC: 4 MMOL/L (ref 0–18)
BUN BLD-MCNC: 20 MG/DL (ref 7–18)
BUN/CREAT SERPL: 18.7 (ref 10–20)
CALCIUM BLD-MCNC: 9.5 MG/DL (ref 8.5–10.1)
CHLORIDE SERPL-SCNC: 107 MMOL/L (ref 98–112)
CO2 SERPL-SCNC: 31 MMOL/L (ref 21–32)
CREAT BLD-MCNC: 1.07 MG/DL (ref 0.7–1.3)
GLUCOSE BLD-MCNC: 94 MG/DL (ref 70–99)
INR BLD: 2.23 (ref 0.9–1.2)
OSMOLALITY SERPL CALC.SUM OF ELEC: 296 MOSM/KG (ref 275–295)
POTASSIUM SERPL-SCNC: 4.1 MMOL/L (ref 3.5–5.1)
PROTHROMBIN TIME: 24.3 SECONDS (ref 11.8–14.5)
SODIUM SERPL-SCNC: 142 MMOL/L (ref 136–145)
TSI SER-ACNC: 1.55 MIU/ML (ref 0.36–3.74)

## 2020-08-06 PROCEDURE — 99233 SBSQ HOSP IP/OBS HIGH 50: CPT | Performed by: NURSE PRACTITIONER

## 2020-08-06 RX ORDER — FUROSEMIDE 40 MG/1
40 TABLET ORAL DAILY
Status: DISCONTINUED | OUTPATIENT
Start: 2020-08-06 | End: 2020-08-08

## 2020-08-06 NOTE — PLAN OF CARE
BP low this morning-- improved throughout the day. Asymptomatic. Down 2 lbs today-- weight to be obtained via standing scale. Ambulating independently in room.     Problem: Patient Centered Care  Goal: Patient preferences are identified and integrated in th cardiac monitoring, monitor vital signs, obtain 12 lead EKG if indicated  - Evaluate effectiveness of antiarrhythmic and heart rate control medications as ordered  - Initiate emergency measures for life threatening arrhythmias  - Monitor electrolytes and a including labs, urine output, blood pressure (other measures as available)  - Encourage oral intake as appropriate  - Instruct patient on fluid and nutrition restrictions as appropriate  Outcome: Progressing

## 2020-08-06 NOTE — PROGRESS NOTES
[facility]           8/4/2020    Assessment and Plan:   1. Atrial fibrillation with RVR; on Coumadn - INR supratherapeutic; rates better except with exertions so will try to control rates with po coreg and digoxin  2.  Acute on Chronic H 08/03/20  1258   TROP <0.045     Recent Labs   Lab 08/05/20  0542   RBC 4.95   HGB 14.6   HCT 45.5   MCV 91.9   MCH 29.5   MCHC 32.1   RDW 15.6*   NEPRELIM 6.63   WBC 9.9   .0     Recent Labs   Lab 08/04/20  0510 08/05/20  0542 08/06/20  0519   INR

## 2020-08-06 NOTE — PLAN OF CARE
Pt up to chair for lunch. Asymptomatic with low bp this morning-- pt reports he \"feels dry\". Lasix held per Dr. Lisseth Sauer, all other medications administered. Pt ambulated 200 feet in the hallway with this RN. Max -- tolerated well.

## 2020-08-06 NOTE — PLAN OF CARE
Patient AOX4, afib on the monitor. Rates controlled at rest, 150-160 bpm with activity. Asymptomatic, denies CP or SOB. CPAP at night. Entresto started and coumadin restarted this evening. Patient up independently to bathroom.  Call light within reach, kulwinder weights  Outcome: Progressing  Goal: Absence of cardiac arrhythmias or at baseline  Description  INTERVENTIONS:  - Continuous cardiac monitoring, monitor vital signs, obtain 12 lead EKG if indicated  - Evaluate effectiveness of antiarrhythmic and heart rat gravity, serum osmolarity and serum sodium as indicated or ordered  - Monitor response to interventions for patient's volume status, including labs, urine output, blood pressure (other measures as available)  - Encourage oral intake as appropriate  - Instr

## 2020-08-06 NOTE — PROGRESS NOTES
[facility]    Progress Note    Kelly Que Patient Status:  Inpatient    3/22/1955 MRN [id number]   Location [facility] 3W/SW Attending Italia Beckman MD   Hosp Day # 3 PCP Alicia Perales MD        Subjective:   Roque Melgar K 4.1 08/06/2020     08/06/2020    CO2 31.0 08/06/2020    GLU 94 08/06/2020    CA 9.5 08/06/2020    ALB 2.51 (L) 07/06/2020    ALKPHO 73 11/25/2019    BILT 0.8 11/25/2019    TP 5.8 (L) 07/06/2020    AST 17 11/25/2019    ALT 23 11/25/2019    INR 2.23

## 2020-08-07 LAB
ANION GAP SERPL CALC-SCNC: 5 MMOL/L (ref 0–18)
BUN BLD-MCNC: 21 MG/DL (ref 7–18)
BUN/CREAT SERPL: 20.2 (ref 10–20)
CALCIUM BLD-MCNC: 9.1 MG/DL (ref 8.5–10.1)
CHLORIDE SERPL-SCNC: 108 MMOL/L (ref 98–112)
CO2 SERPL-SCNC: 28 MMOL/L (ref 21–32)
CREAT BLD-MCNC: 1.04 MG/DL (ref 0.7–1.3)
GLUCOSE BLD-MCNC: 95 MG/DL (ref 70–99)
INR BLD: 2.12 (ref 0.9–1.2)
OSMOLALITY SERPL CALC.SUM OF ELEC: 295 MOSM/KG (ref 275–295)
POTASSIUM SERPL-SCNC: 4.1 MMOL/L (ref 3.5–5.1)
PROTHROMBIN TIME: 23.4 SECONDS (ref 11.8–14.5)
SODIUM SERPL-SCNC: 141 MMOL/L (ref 136–145)

## 2020-08-07 PROCEDURE — 99233 SBSQ HOSP IP/OBS HIGH 50: CPT | Performed by: HOSPITALIST

## 2020-08-07 RX ORDER — ZOLPIDEM TARTRATE 5 MG/1
5 TABLET ORAL NIGHTLY PRN
Status: DISCONTINUED | OUTPATIENT
Start: 2020-08-07 | End: 2020-08-08

## 2020-08-07 RX ORDER — AMIODARONE HYDROCHLORIDE 200 MG/1
400 TABLET ORAL 2 TIMES DAILY WITH MEALS
Status: DISCONTINUED | OUTPATIENT
Start: 2020-08-07 | End: 2020-08-08

## 2020-08-07 NOTE — PLAN OF CARE
Plan for possible D/C this am. Blood pressure improved.   Problem: Patient Centered Care  Goal: Patient preferences are identified and integrated in the patient's plan of care  Description  Interventions:  - What would you like us to know as we care for you antiarrhythmic and heart rate control medications as ordered  - Initiate emergency measures for life threatening arrhythmias  - Monitor electrolytes and administer replacement therapy as ordered  Outcome: Progressing     Problem: RESPIRATORY - ADULT  Goal: appropriate  - Instruct patient on fluid and nutrition restrictions as appropriate  Outcome: Progressing

## 2020-08-07 NOTE — PLAN OF CARE
A&Ox4. Patient resting comfortably in bed. Ambulated with pt this AM, -160s afib sustaining with SOB. Cards notified, started on PO Amio BID. SBP 90s, ok to give carvedilol and lasix, hold for sbp<90. INR today 2.12. Will continue to monitor.     Prob as we care for you?  \"I love to cook\"  - Provide timely, complete, and accurate information to patient/family  - Incorporate patient and family knowledge, values, beliefs, and cultural backgrounds into the planning and delivery of care  - Encourage patien serum osmolarity and serum sodium as indicated or ordered  - Monitor response to interventions for patient's volume status, including labs, urine output, blood pressure (other measures as available)  - Encourage oral intake as appropriate  - Instruct patie

## 2020-08-07 NOTE — PROGRESS NOTES
[facility]    Progress Note    Niki Hanson Patient Status:  Inpatient    3/22/1955 MRN [id number]   Location [facility] 3W/SW Attending Maria Teresa Butt MD   Hosp Day # 4 PCP Helane Lundborg, MD        Subjective:   Leandro Carlson 08/07/2020    K 4.1 08/07/2020     08/07/2020    CO2 28.0 08/07/2020    GLU 95 08/07/2020    CA 9.1 08/07/2020    ALB 2.51 (L) 07/06/2020    ALKPHO 73 11/25/2019    BILT 0.8 11/25/2019    TP 5.8 (L) 07/06/2020    AST 17 11/25/2019    ALT 23 11/25/201

## 2020-08-07 NOTE — PAYOR COMM NOTE
--------------  8/6 CONTINUED STAY REVIEW    Payor: [address] [place] [address] Road #:  [id number]  Authorization Number: [id number]    CARDS:       Assessment and Plan:   1.  Atrial fibrillation with RVR; on Coumadn - INR supratherapeut Labs   Lab 08/05/20  0542   RBC 4.95   HGB 14.6   HCT 45.5   MCV 91.9   MCH 29.5   MCHC 32.1   RDW 15.6*   NEPRELIM 6.63   WBC 9.9   .0            Recent Labs   Lab 08/04/20  0510 08/05/20  0542 08/06/20  0519   INR 3.72* 3.03* 2.23*         08/06/2 oriented to person, place, and time and obese. He appears well-developed. HENT:   Head: Normocephalic and atraumatic. Neck: Normal range of motion. Cardiovascular: Normal rate.     Pulmonary/Chest: Effort normal and breath sounds normal. He has no whe found.               Obi Ogden, APRN  8/7/2020        Attending addendum:  Pt seen and examined. Agree with above.     Pt with rapid a-fib and SOB with ambulation. Amiodarone started.    Monitor rates on amiodarone.       >35 minutes spent      Car

## 2020-08-08 VITALS
BODY MASS INDEX: 35.56 KG/M2 | SYSTOLIC BLOOD PRESSURE: 103 MMHG | HEART RATE: 67 BPM | OXYGEN SATURATION: 95 % | WEIGHT: 277.13 LBS | TEMPERATURE: 98 F | DIASTOLIC BLOOD PRESSURE: 88 MMHG | HEIGHT: 74 IN | RESPIRATION RATE: 18 BRPM

## 2020-08-08 LAB
ANION GAP SERPL CALC-SCNC: 5 MMOL/L (ref 0–18)
BUN BLD-MCNC: 26 MG/DL (ref 7–18)
BUN/CREAT SERPL: 24.5 (ref 10–20)
CALCIUM BLD-MCNC: 8.9 MG/DL (ref 8.5–10.1)
CHLORIDE SERPL-SCNC: 109 MMOL/L (ref 98–112)
CO2 SERPL-SCNC: 26 MMOL/L (ref 21–32)
CREAT BLD-MCNC: 1.06 MG/DL (ref 0.7–1.3)
GLUCOSE BLD-MCNC: 96 MG/DL (ref 70–99)
INR BLD: 2.88 (ref 0.9–1.2)
OSMOLALITY SERPL CALC.SUM OF ELEC: 295 MOSM/KG (ref 275–295)
POTASSIUM SERPL-SCNC: 4.2 MMOL/L (ref 3.5–5.1)
PROTHROMBIN TIME: 29.7 SECONDS (ref 11.8–14.5)
SODIUM SERPL-SCNC: 140 MMOL/L (ref 136–145)

## 2020-08-08 PROCEDURE — 99239 HOSP IP/OBS DSCHRG MGMT >30: CPT | Performed by: HOSPITALIST

## 2020-08-08 RX ORDER — FUROSEMIDE 20 MG/1
20 TABLET ORAL DAILY
Qty: 30 TABLET | Refills: 1 | Status: SHIPPED | OUTPATIENT
Start: 2020-08-08 | End: 2020-08-21

## 2020-08-08 RX ORDER — CARVEDILOL 25 MG/1
25 TABLET ORAL 2 TIMES DAILY WITH MEALS
Status: DISCONTINUED | OUTPATIENT
Start: 2020-08-08 | End: 2020-08-08

## 2020-08-08 RX ORDER — SPIRONOLACTONE 25 MG/1
25 TABLET ORAL DAILY
Qty: 30 TABLET | Refills: 1 | Status: SHIPPED | OUTPATIENT
Start: 2020-08-09 | End: 2020-08-21

## 2020-08-08 NOTE — DISCHARGE SUMMARY
[facility]    Discharge Summary    Kelly Grey Patient Status:  Inpatient    3/22/1955 MRN [id number]   Location [facility] 3W/SW Attending Italia Beckman MD   Hosp Day # 5 PCP Alicia Perales MD     Date of Admission: (1.88 m)   Wt 277 lb 1.6 oz (125.7 kg)   SpO2 95%   BMI 35.58 kg/m²     Gen:  NAD. A and O x  3  CV:   RRR.   No m/g/r  Pulm:   CTA bilat  Abd:   +bs, soft, NT, ND  LE:  No c/c/e, venous stasis changes  Neuro:  nonfocal      Reason for Admission:    Atrial Discharge Condition:  Good    Discharge Medications:      Discharge Medications      START taking these medications      Instructions Prescription details   furosemide 20 MG Tabs  Commonly known as:  LASIX      Take 1 tablet (20 mg total) by mouth mino medications were sent to [address], [phone] [address] [facility], [facility] Waqas Mcdonald    Phone:  [phone]   · furosemide 20 MG Tabs  · spironolactone 25 MG Tabs     Information about where

## 2020-08-08 NOTE — PROGRESS NOTES
[facility]  Progress Note    Steve Recio Patient Status:  Inpatient    3/22/1955 MRN [id number]   Location [facility] 3W/SW Attending Luke Osborn MD   Hosp Day # 5 PCP Lucinda Wayne MD     Impression:  1.  Atria sounds are normoactive  Extremities: No clubbing/cyanosis; moves all 4 extremities normally    amiodarone HCl (PACERONE) tab 400 mg, 400 mg, Oral, BID with meals  zolpidem (AMBIEN) tab 5 mg, 5 mg, Oral, Nightly PRN  furosemide (LASIX) tab 40 mg, 40 mg, Ora

## 2020-08-08 NOTE — PLAN OF CARE
AOX4, denies pain but c/o occasional orthopnea. Up independently to bathroom, HR sustained 130's with ambulation. Denies associated dizziness or SOB.  PRN Eileen Lugo given per patient request. Call light within reach    Problem: Patient Centered Care  Goal: Ruthie Lambert baseline  Description  INTERVENTIONS:  - Continuous cardiac monitoring, monitor vital signs, obtain 12 lead EKG if indicated  - Evaluate effectiveness of antiarrhythmic and heart rate control medications as ordered  - Initiate emergency measures for life t response to interventions for patient's volume status, including labs, urine output, blood pressure (other measures as available)  - Encourage oral intake as appropriate  - Instruct patient on fluid and nutrition restrictions as appropriate  Outcome: Progr

## 2020-08-10 ENCOUNTER — PATIENT OUTREACH (OUTPATIENT)
Dept: CASE MANAGEMENT | Age: 65
End: 2020-08-10

## 2020-08-10 ENCOUNTER — TELEPHONE (OUTPATIENT)
Dept: INTERNAL MEDICINE CLINIC | Facility: CLINIC | Age: 65
End: 2020-08-10

## 2020-08-10 DIAGNOSIS — Z02.9 ENCOUNTERS FOR ADMINISTRATIVE PURPOSE: ICD-10-CM

## 2020-08-10 PROCEDURE — 1111F DSCHRG MED/CURRENT MED MERGE: CPT

## 2020-08-10 NOTE — TELEPHONE ENCOUNTER
Pt states that he was discharged from [facility] and was told to make an appointment with the coumadin clinic asap. Pt would like to be seen as soon as possible.

## 2020-08-10 NOTE — TELEPHONE ENCOUNTER
Called and spoke with Skip Slim. INR on 8/8= 2.8. [address] appointment is already scheduled for 8/24 and patient will keep that appointment.

## 2020-08-10 NOTE — TELEPHONE ENCOUNTER
Spoke with Dr. Álvaro Zelaya RN, he wants Jarad Skelton to have closer observation of his INR. He would like Jarad Skelton to be seen this week. Spoke with Jarad Skelton and scheduled for 8:45 Wednesday 8/12.

## 2020-08-11 NOTE — PROGRESS NOTES
Initial Post Discharge Follow Up   Discharge Date: 8/8/20  Contact Date: 8/10/2020    Consent Verification:  Assessment Completed With: Patient  HIPAA Verified?   Yes    Discharge Dx:  Atrial fibrillation with rapid ventricular response         General: ANTICOAGULATION CLINIC: 1  AND 1/2 TABLETS BY MOUTH AS DIRECTED IN EVENING (Patient taking differently: Take 5 mg by mouth daily.  ) 135 tablet 3   • aspirin (ECOTRIN LOW STRENGTH) 81 MG Oral Tab EC Take 81 mg by mouth daily.        • (NCM)  Were there any Unknown LION Woodson [facility] ([place] at [address] [place] [address])        Aug 24, 2020  9:30 AM CDT Anticoagulation Visit with [facility], [address], [place] ([facility])            [facility] reviewed and discussed. Any changes or updates to medications and or orders sent to PCP.

## 2020-08-12 ENCOUNTER — ANTI-COAG VISIT (OUTPATIENT)
Dept: INTERNAL MEDICINE CLINIC | Facility: CLINIC | Age: 65
End: 2020-08-12
Payer: COMMERCIAL

## 2020-08-12 DIAGNOSIS — Z79.01 LONG TERM (CURRENT) USE OF ANTICOAGULANTS: ICD-10-CM

## 2020-08-12 DIAGNOSIS — I48.20 CHRONIC ATRIAL FIBRILLATION (HCC): ICD-10-CM

## 2020-08-12 DIAGNOSIS — Z51.81 ENCOUNTER FOR THERAPEUTIC DRUG MONITORING: ICD-10-CM

## 2020-08-12 LAB
INR: 3.3 (ref 0.8–1.2)
TEST STRIP EXPIRATION DATE: ABNORMAL DATE

## 2020-08-12 PROCEDURE — 36416 COLLJ CAPILLARY BLOOD SPEC: CPT

## 2020-08-12 PROCEDURE — 85610 PROTHROMBIN TIME: CPT

## 2020-08-13 ENCOUNTER — OFFICE VISIT (OUTPATIENT)
Dept: INTERNAL MEDICINE CLINIC | Facility: CLINIC | Age: 65
End: 2020-08-13
Payer: COMMERCIAL

## 2020-08-13 VITALS
BODY MASS INDEX: 36.06 KG/M2 | HEIGHT: 74 IN | HEART RATE: 79 BPM | WEIGHT: 281 LBS | DIASTOLIC BLOOD PRESSURE: 70 MMHG | SYSTOLIC BLOOD PRESSURE: 114 MMHG | RESPIRATION RATE: 16 BRPM

## 2020-08-13 DIAGNOSIS — E66.01 MORBID OBESITY DUE TO EXCESS CALORIES (HCC): ICD-10-CM

## 2020-08-13 DIAGNOSIS — E53.8 VITAMIN B12 DEFICIENCY: ICD-10-CM

## 2020-08-13 DIAGNOSIS — I50.23 ACUTE ON CHRONIC SYSTOLIC HEART FAILURE (HCC): Primary | ICD-10-CM

## 2020-08-13 PROBLEM — I50.20 SYSTOLIC HEART FAILURE (HCC): Status: ACTIVE | Noted: 2020-08-13

## 2020-08-13 PROCEDURE — 96372 THER/PROPH/DIAG INJ SC/IM: CPT | Performed by: INTERNAL MEDICINE

## 2020-08-13 PROCEDURE — 3074F SYST BP LT 130 MM HG: CPT | Performed by: INTERNAL MEDICINE

## 2020-08-13 PROCEDURE — 1111F DSCHRG MED/CURRENT MED MERGE: CPT | Performed by: INTERNAL MEDICINE

## 2020-08-13 PROCEDURE — G0463 HOSPITAL OUTPT CLINIC VISIT: HCPCS | Performed by: INTERNAL MEDICINE

## 2020-08-13 PROCEDURE — 3008F BODY MASS INDEX DOCD: CPT | Performed by: INTERNAL MEDICINE

## 2020-08-13 PROCEDURE — 99214 OFFICE O/P EST MOD 30 MIN: CPT | Performed by: INTERNAL MEDICINE

## 2020-08-13 PROCEDURE — 3078F DIAST BP <80 MM HG: CPT | Performed by: INTERNAL MEDICINE

## 2020-08-13 RX ORDER — CYANOCOBALAMIN 1000 UG/ML
1000 INJECTION INTRAMUSCULAR; SUBCUTANEOUS ONCE
Status: COMPLETED | OUTPATIENT
Start: 2020-08-13 | End: 2020-08-13

## 2020-08-13 RX ADMIN — CYANOCOBALAMIN 1000 MCG: 1000 INJECTION INTRAMUSCULAR; SUBCUTANEOUS at 09:29:00

## 2020-08-13 NOTE — PROGRESS NOTES
HPI:    Ryan Trotter is a 76year old male here today for hospital follow up.    He was discharged from Inpatient hospital[facility]  to Home   Admission Date: 8/3/20   Discharge Date: 8/8/20  Hospital Discharge Diagnoses (since 7/14/2020) daily.  spironolactone 25 MG Oral Tab, Take 1 tablet (25 mg total) by mouth daily. furosemide 20 MG Oral Tab, Take 1 tablet (20 mg total) by mouth daily. Sacubitril-Valsartan 24-26 MG Oral Tab, Take 1 tablet by mouth 2 (two) times daily.   atorvastatin 10 unusual skin lesions  EYES: denies blurred vision or double vision  HEENT: denies nasal congestion, sinus pain or ST  LUNGS: denies shortness of breath with exertion  CARDIOVASCULAR: denies chest pain on exertion or palpitations  GI: denies abdominal pain, Follow up as discussed.     2. Morbid obesity due to excess calories [facility])    The patient is obese with BMI near 36  Discussed with the patient obesity complications including risk for cardiovascular risk events, also degenerative arthritis of joints and sp

## 2020-08-14 PROBLEM — I10 ESSENTIAL HYPERTENSION: Status: ACTIVE | Noted: 2020-08-14

## 2020-08-14 PROBLEM — Z09 HOSPITAL DISCHARGE FOLLOW-UP: Status: ACTIVE | Noted: 2020-08-14

## 2020-08-14 PROBLEM — I50.22 CHRONIC SYSTOLIC CHF (CONGESTIVE HEART FAILURE) (HCC): Status: ACTIVE | Noted: 2020-08-13

## 2020-08-24 ENCOUNTER — TELEPHONE (OUTPATIENT)
Dept: INTERNAL MEDICINE CLINIC | Facility: CLINIC | Age: 65
End: 2020-08-24

## 2020-08-24 DIAGNOSIS — Z51.81 ENCOUNTER FOR THERAPEUTIC DRUG MONITORING: ICD-10-CM

## 2020-08-24 DIAGNOSIS — Z79.01 LONG TERM (CURRENT) USE OF ANTICOAGULANTS: ICD-10-CM

## 2020-08-24 DIAGNOSIS — I48.20 CHRONIC ATRIAL FIBRILLATION (HCC): Primary | ICD-10-CM

## 2020-09-01 ENCOUNTER — HOSPITAL ENCOUNTER (OUTPATIENT)
Facility: HOSPITAL | Age: 65
Setting detail: OBSERVATION
Discharge: NURSING, REHAB OR LONG-TERM CARE | End: 2020-09-08
Attending: EMERGENCY MEDICINE | Admitting: HOSPITALIST
Payer: COMMERCIAL

## 2020-09-01 ENCOUNTER — APPOINTMENT (OUTPATIENT)
Dept: GENERAL RADIOLOGY | Facility: HOSPITAL | Age: 65
End: 2020-09-01
Payer: COMMERCIAL

## 2020-09-01 DIAGNOSIS — M25.50 POLYARTHRALGIA: ICD-10-CM

## 2020-09-01 DIAGNOSIS — R26.2 UNABLE TO WALK: Primary | ICD-10-CM

## 2020-09-01 DIAGNOSIS — I48.20 CHRONIC ATRIAL FIBRILLATION (HCC): ICD-10-CM

## 2020-09-01 LAB
ANION GAP SERPL CALC-SCNC: 7 MMOL/L (ref 0–18)
BACTERIA UR QL AUTO: NEGATIVE /HPF
BASOPHILS # BLD AUTO: 0.03 X10(3) UL (ref 0–0.2)
BASOPHILS NFR BLD AUTO: 0.3 %
BILIRUB UR QL: NEGATIVE
BUN BLD-MCNC: 13 MG/DL (ref 7–18)
BUN/CREAT SERPL: 11.3 (ref 10–20)
CALCIUM BLD-MCNC: 9.4 MG/DL (ref 8.5–10.1)
CHLORIDE SERPL-SCNC: 109 MMOL/L (ref 98–112)
CO2 SERPL-SCNC: 24 MMOL/L (ref 21–32)
COLOR UR: YELLOW
CREAT BLD-MCNC: 1.15 MG/DL (ref 0.7–1.3)
DEPRECATED RDW RBC AUTO: 51.6 FL (ref 35.1–46.3)
EOSINOPHIL # BLD AUTO: 0.01 X10(3) UL (ref 0–0.7)
EOSINOPHIL NFR BLD AUTO: 0.1 %
ERYTHROCYTE [DISTWIDTH] IN BLOOD BY AUTOMATED COUNT: 15.5 % (ref 11–15)
GLUCOSE BLD-MCNC: 99 MG/DL (ref 70–99)
GLUCOSE UR-MCNC: NEGATIVE MG/DL
HCT VFR BLD AUTO: 44.5 % (ref 39–53)
HGB BLD-MCNC: 14.6 G/DL (ref 13–17.5)
IMM GRANULOCYTES # BLD AUTO: 0.04 X10(3) UL (ref 0–1)
IMM GRANULOCYTES NFR BLD: 0.4 %
INR BLD: 3.29 (ref 0.9–1.2)
LEUKOCYTE ESTERASE UR QL STRIP.AUTO: NEGATIVE
LYMPHOCYTES # BLD AUTO: 1.47 X10(3) UL (ref 1–4)
LYMPHOCYTES NFR BLD AUTO: 16.5 %
MCH RBC QN AUTO: 29.9 PG (ref 26–34)
MCHC RBC AUTO-ENTMCNC: 32.8 G/DL (ref 31–37)
MCV RBC AUTO: 91 FL (ref 80–100)
MONOCYTES # BLD AUTO: 1.11 X10(3) UL (ref 0.1–1)
MONOCYTES NFR BLD AUTO: 12.4 %
NEUTROPHILS # BLD AUTO: 6.26 X10 (3) UL (ref 1.5–7.7)
NEUTROPHILS # BLD AUTO: 6.26 X10(3) UL (ref 1.5–7.7)
NEUTROPHILS NFR BLD AUTO: 70.3 %
NITRITE UR QL STRIP.AUTO: NEGATIVE
OSMOLALITY SERPL CALC.SUM OF ELEC: 290 MOSM/KG (ref 275–295)
PH UR: 5 [PH] (ref 5–8)
PLATELET # BLD AUTO: 299 10(3)UL (ref 150–450)
POTASSIUM SERPL-SCNC: 4.5 MMOL/L (ref 3.5–5.1)
PROT UR-MCNC: NEGATIVE MG/DL
PROTHROMBIN TIME: 33 SECONDS (ref 11.8–14.5)
RBC # BLD AUTO: 4.89 X10(6)UL (ref 3.8–5.8)
RBC #/AREA URNS AUTO: 11 /HPF
SARS-COV-2 RNA RESP QL NAA+PROBE: NOT DETECTED
SODIUM SERPL-SCNC: 140 MMOL/L (ref 136–145)
SP GR UR STRIP: 1.02 (ref 1–1.03)
TROPONIN I SERPL-MCNC: <0.045 NG/ML (ref ?–0.04)
UROBILINOGEN UR STRIP-ACNC: 4
WBC # BLD AUTO: 8.9 X10(3) UL (ref 4–11)
WBC #/AREA URNS AUTO: 1 /HPF

## 2020-09-01 PROCEDURE — 71045 X-RAY EXAM CHEST 1 VIEW: CPT | Performed by: EMERGENCY MEDICINE

## 2020-09-01 PROCEDURE — 99220 INITIAL OBSERVATION CARE,LEVL III: CPT | Performed by: HOSPITALIST

## 2020-09-01 RX ORDER — SPIRONOLACTONE 25 MG/1
25 TABLET ORAL DAILY
Status: ON HOLD | COMMUNITY
End: 2020-09-04

## 2020-09-01 RX ORDER — FUROSEMIDE 20 MG/1
20 TABLET ORAL DAILY
Status: DISCONTINUED | OUTPATIENT
Start: 2020-09-02 | End: 2020-09-04

## 2020-09-01 RX ORDER — DIGOXIN 125 MCG
125 TABLET ORAL DAILY
Status: DISCONTINUED | OUTPATIENT
Start: 2020-09-02 | End: 2020-09-08

## 2020-09-01 RX ORDER — ASPIRIN 81 MG/1
81 TABLET ORAL DAILY
Status: DISCONTINUED | OUTPATIENT
Start: 2020-09-02 | End: 2020-09-08

## 2020-09-01 RX ORDER — SPIRONOLACTONE 25 MG/1
25 TABLET ORAL DAILY
Status: DISCONTINUED | OUTPATIENT
Start: 2020-09-02 | End: 2020-09-04

## 2020-09-01 RX ORDER — PREDNISONE 20 MG/1
20 TABLET ORAL DAILY
Status: ON HOLD | COMMUNITY
End: 2020-09-04

## 2020-09-01 RX ORDER — ATORVASTATIN CALCIUM 10 MG/1
10 TABLET, FILM COATED ORAL NIGHTLY
Status: DISCONTINUED | OUTPATIENT
Start: 2020-09-01 | End: 2020-09-08

## 2020-09-01 RX ORDER — PREDNISONE 20 MG/1
20 TABLET ORAL DAILY
Status: DISCONTINUED | OUTPATIENT
Start: 2020-09-02 | End: 2020-09-02

## 2020-09-01 RX ORDER — SODIUM CHLORIDE 0.9 % (FLUSH) 0.9 %
3 SYRINGE (ML) INJECTION AS NEEDED
Status: DISCONTINUED | OUTPATIENT
Start: 2020-09-01 | End: 2020-09-08

## 2020-09-01 RX ORDER — SODIUM CHLORIDE 9 MG/ML
INJECTION, SOLUTION INTRAVENOUS CONTINUOUS
Status: DISCONTINUED | OUTPATIENT
Start: 2020-09-01 | End: 2020-09-02

## 2020-09-01 NOTE — ED NOTES
Pt resting in bed with family at bedside. Pt AAOx4, respirating well on room air. No acute distress noted. Will continue to monitor.

## 2020-09-01 NOTE — ED NOTES
[facility] in bed with wife at bedside. Pt respirating well on room air, AAOx4. Pt denies complaint. Will continue to monitor.

## 2020-09-01 NOTE — ED INITIAL ASSESSMENT (HPI)
Via EMS from home--patient was unable to get out of bed for his coumadin clinic appt today due to weakness. He believes this is due to his afib.    Denies pain, denies cough, denies shortness of breath    Patient with poor appetite

## 2020-09-01 NOTE — ED NOTES
Orders for admission, patient is aware of plan and ready to go upstairs. Any questions, please call ED RN Ed La  at [facility]. Pt AAOx4, respirating well on room air.

## 2020-09-01 NOTE — ED NOTES
3/6/2017          To Whom It May Concern:    Dez Robertson is currently under my medical care. Please excuse the patient from school missed as he has been ill. May return to school when well.     If you require additional information please contact our Pt resting in bed with family at bedside. Pt AAOx4, respirating well on room air. Pt denies complaint. Will continue to monitor.

## 2020-09-01 NOTE — CM/SW NOTE
Cm met with patient and wife at bedside    Patient states he was at [facility] in the past    Physical therapy to see patient to obtain insurance authorization for rehab    Referral sent to Duglas Mahoney to begin process    P

## 2020-09-02 LAB
ANION GAP SERPL CALC-SCNC: 5 MMOL/L (ref 0–18)
BASOPHILS # BLD AUTO: 0.04 X10(3) UL (ref 0–0.2)
BASOPHILS NFR BLD AUTO: 0.6 %
BUN BLD-MCNC: 13 MG/DL (ref 7–18)
BUN/CREAT SERPL: 14 (ref 10–20)
CALCIUM BLD-MCNC: 8.9 MG/DL (ref 8.5–10.1)
CHLORIDE SERPL-SCNC: 110 MMOL/L (ref 98–112)
CO2 SERPL-SCNC: 26 MMOL/L (ref 21–32)
CREAT BLD-MCNC: 0.93 MG/DL (ref 0.7–1.3)
DEPRECATED RDW RBC AUTO: 52.7 FL (ref 35.1–46.3)
EOSINOPHIL # BLD AUTO: 0.04 X10(3) UL (ref 0–0.7)
EOSINOPHIL NFR BLD AUTO: 0.6 %
ERYTHROCYTE [DISTWIDTH] IN BLOOD BY AUTOMATED COUNT: 15.4 % (ref 11–15)
GLUCOSE BLD-MCNC: 101 MG/DL (ref 70–99)
HCT VFR BLD AUTO: 45.5 % (ref 39–53)
HGB BLD-MCNC: 14.5 G/DL (ref 13–17.5)
IMM GRANULOCYTES # BLD AUTO: 0.02 X10(3) UL (ref 0–1)
IMM GRANULOCYTES NFR BLD: 0.3 %
INR BLD: 3.63 (ref 0.9–1.2)
LYMPHOCYTES # BLD AUTO: 1.37 X10(3) UL (ref 1–4)
LYMPHOCYTES NFR BLD AUTO: 19.9 %
MCH RBC QN AUTO: 29.4 PG (ref 26–34)
MCHC RBC AUTO-ENTMCNC: 31.9 G/DL (ref 31–37)
MCV RBC AUTO: 92.3 FL (ref 80–100)
MONOCYTES # BLD AUTO: 0.88 X10(3) UL (ref 0.1–1)
MONOCYTES NFR BLD AUTO: 12.8 %
NEUTROPHILS # BLD AUTO: 4.53 X10 (3) UL (ref 1.5–7.7)
NEUTROPHILS # BLD AUTO: 4.53 X10(3) UL (ref 1.5–7.7)
NEUTROPHILS NFR BLD AUTO: 65.8 %
OSMOLALITY SERPL CALC.SUM OF ELEC: 292 MOSM/KG (ref 275–295)
PLATELET # BLD AUTO: 253 10(3)UL (ref 150–450)
POTASSIUM SERPL-SCNC: 3.9 MMOL/L (ref 3.5–5.1)
PROTHROMBIN TIME: 35.6 SECONDS (ref 11.8–14.5)
RBC # BLD AUTO: 4.93 X10(6)UL (ref 3.8–5.8)
SODIUM SERPL-SCNC: 141 MMOL/L (ref 136–145)
WBC # BLD AUTO: 6.9 X10(3) UL (ref 4–11)

## 2020-09-02 PROCEDURE — 99226 SUBSEQUENT OBSERVATION CARE: CPT | Performed by: HOSPITALIST

## 2020-09-02 NOTE — PROGRESS NOTES
[facility]    Progress Note    Steve Recio Patient Status:  Observation    3/22/1955 MRN [id number]   Location [facility]5W Attending Luke Osborn MD   Hosp Day # 0 PCP Lucinda Wayne MD       Subjective:     Pt says voltage -possible pulmonary disease.  ABNORMAL When compared with ECG of 08/03/2020 12:39:39 rate slower Electronically signed on 09/01/2020 at 17:02 by Jory Alfred and Plan:     ASSESSMENT AND PLAN:  Patient is a 29-year-old  ma

## 2020-09-02 NOTE — ED NOTES
Pt resting in bed, AAOx4, respirating well on room air. Pt denies complaint. Will continue to monitor.

## 2020-09-02 NOTE — OCCUPATIONAL THERAPY NOTE
OCCUPATIONAL THERAPY EVALUATION - INPATIENT     Room Number: 544/416-H  Evaluation Date: 9/2/2020  Type of Evaluation: Initial  Presenting Problem: (inability to get OOB)    Physician Order: IP Consult to Occupational Therapy  Reason for Therapy: ADL/IADL rehabilitation  OT Device Recommendations: Reacher;Sock aid;Long-handled shoehorn;Long-handled sponge;Raised toilet seat;Transfer tub bench    PLAN  OT Treatment Plan: ADL training;Functional transfer training;Patient/Family education;Patient/Family traini Uses: Glasses      Prior Level of [place]: Pt reports he was managing at home since d/c from [facility] recently with the assist of his wife and dtr for LE self care and adaptation to seat height.      SUBJECTIVE  \"I want to know why I get like this\"    [facility] 9/16/20  Frequency: 3x/week

## 2020-09-02 NOTE — PLAN OF CARE
Problem: Patient Centered Care  Goal: Patient preferences are identified and integrated in the patient's plan of care  Description  Interventions:  - What would you like us to know as we care for you?   - Provide timely, complete, and accurate informatio Identify cognitive and physical deficits and behaviors that affect risk of falls.   - [place] fall precautions as indicated by assessment.  - Educate pt/family on patient safety including physical limitations  - Instruct pt to call for assistance with act participate in ADLs to maximize function  - Promote sitting position while performing ADLs such as feeding, grooming, and bathing  - Educate and encourage patient/family in tolerated functional activity level and precautions during self-care     Outcome: P

## 2020-09-02 NOTE — PHYSICAL THERAPY NOTE
PHYSICAL THERAPY EVALUATION - INPATIENT     Room Number: 434/422-J  Evaluation Date: 9/2/2020  Type of Evaluation: Initial   Physician Order: PT Eval and Treat    Presenting Problem: polyarthralgia, myopathy weakness  Reason for Therapy: Mobility Dysfunct Sub-acute rehabilitation(PT vs home 24 hr assist and PT as progress allows)    PLAN  PT Treatment Plan: Bed mobility; Body mechanics; Endurance; Energy conservation;Patient education;Gait training;Range of motion;Stair training;Transfer training;Balance train internal fixation (Bilateral wrist fractures)       HOME SITUATION  Type of Home: House   Home Layout: Multi-level  Stairs to Enter : 2             Lives With: Spouse             Prior Level of [place]: Min A with ADL's and mobility intermittently use Impairment: 54.16%   Standardized Score (AM-PAC Scale): 40.78   CMS Modifier (G-Code): CK    FUNCTIONAL ABILITY STATUS  Gait Assessment   Gait Assistance: Contact guard assist  Distance (ft): 15' x 2  Assistive Device: Rolling walker  Pattern: (decreased s

## 2020-09-02 NOTE — ED PROVIDER NOTES
Patient Seen in: [facility]5w      History   Patient presents with:  Weakness    Stated Complaint: weakness    HPI    The patient is a 71-year-old male with a history of atrial fibrillation who presents with 2 weeks of gradually increasing general ELECTROCARDIOGRAM, COMPLETE  2/27/2012    scanned to media tab   • HERNIA SURGERY  2005 2005-hernia repair   • LIPOMA REMOVAL      Excision lipoma of back   • OTHER SURGICAL HISTORY      jered legs vein stripping   • OTHER SURGICAL HISTORY  1993    Bilate Conjunctivae normal.      Pupils: Pupils are equal, round, and reactive to light. Neck:      Musculoskeletal: Normal range of motion and neck supple. Vascular: No JVD. Cardiovascular:      Rate and Rhythm: Normal rate and regular rhythm.       Hear components within normal limits   CBC W/ DIFFERENTIAL - Abnormal; Notable for the following components:    RDW-SD 51.6 (*)     RDW 15.5 (*)     Monocyte Absolute 1.11 (*)     All other components within normal limits   BASIC METABOLIC PANEL (8) - Normal 6:41 PM                   Disposition and Plan     Clinical Impression:  Unable to walk  (primary encounter diagnosis)  Polyarthralgia    Disposition:  Admit  9/1/2020  6:41 pm    Follow-up:  No follow-up provider specified.   We recommend that you schedule

## 2020-09-02 NOTE — H&P
[facility]    PATIENT'S NAME: Greg Parks ANTON   ATTENDING PHYSICIAN: Cherise Foster.  Reese Sellers MD   PATIENT ACCOUNT#:   [id number]    LOCATION:  [place] [address]  MEDICAL RECORD #:   [id number]       YOB: 1955  ADMISSION DATE:       09/01 for atrial fibrillation with RVR, history of systolic congestive heart failure, alcohol-related cardiomyopathy, hypertension, morbid obesity, obstructive sleep apnea, hyperthyroidism, history of alcohol abuse.     PAST SURGICAL HISTORY:  Positive for wrist chest x-ray which shows mild cardiomegaly, overall improvement in the appearance of the chest from August 3, 2020.     ASSESSMENT AND PLAN:  Patient is a 54-year-old  male with past medical history of multiple comorbid conditions and multiple recen

## 2020-09-02 NOTE — CM/SW NOTE
Noted that PT/OT are recommending VICK at discharge. Referral was initiated with [facility] in [place]. DON requested just in case.  &  to remain available and supportive for discharge planning needs.

## 2020-09-03 LAB
ANION GAP SERPL CALC-SCNC: 4 MMOL/L (ref 0–18)
BASOPHILS # BLD AUTO: 0.02 X10(3) UL (ref 0–0.2)
BASOPHILS NFR BLD AUTO: 0.3 %
BUN BLD-MCNC: 12 MG/DL (ref 7–18)
BUN/CREAT SERPL: 12.8 (ref 10–20)
CALCIUM BLD-MCNC: 9.1 MG/DL (ref 8.5–10.1)
CHLORIDE SERPL-SCNC: 110 MMOL/L (ref 98–112)
CO2 SERPL-SCNC: 27 MMOL/L (ref 21–32)
CREAT BLD-MCNC: 0.94 MG/DL (ref 0.7–1.3)
DEPRECATED RDW RBC AUTO: 50.2 FL (ref 35.1–46.3)
EOSINOPHIL # BLD AUTO: 0.01 X10(3) UL (ref 0–0.7)
EOSINOPHIL NFR BLD AUTO: 0.1 %
ERYTHROCYTE [DISTWIDTH] IN BLOOD BY AUTOMATED COUNT: 15.2 % (ref 11–15)
GLUCOSE BLD-MCNC: 99 MG/DL (ref 70–99)
HCT VFR BLD AUTO: 44.7 % (ref 39–53)
HGB BLD-MCNC: 14.6 G/DL (ref 13–17.5)
IMM GRANULOCYTES # BLD AUTO: 0.03 X10(3) UL (ref 0–1)
IMM GRANULOCYTES NFR BLD: 0.4 %
INR BLD: 3.49 (ref 0.9–1.2)
LYMPHOCYTES # BLD AUTO: 1.67 X10(3) UL (ref 1–4)
LYMPHOCYTES NFR BLD AUTO: 23.2 %
MCH RBC QN AUTO: 29.4 PG (ref 26–34)
MCHC RBC AUTO-ENTMCNC: 32.7 G/DL (ref 31–37)
MCV RBC AUTO: 90.1 FL (ref 80–100)
MONOCYTES # BLD AUTO: 0.94 X10(3) UL (ref 0.1–1)
MONOCYTES NFR BLD AUTO: 13 %
NEUTROPHILS # BLD AUTO: 4.54 X10 (3) UL (ref 1.5–7.7)
NEUTROPHILS # BLD AUTO: 4.54 X10(3) UL (ref 1.5–7.7)
NEUTROPHILS NFR BLD AUTO: 63 %
OSMOLALITY SERPL CALC.SUM OF ELEC: 292 MOSM/KG (ref 275–295)
PLATELET # BLD AUTO: 271 10(3)UL (ref 150–450)
POTASSIUM SERPL-SCNC: 3.7 MMOL/L (ref 3.5–5.1)
PROTHROMBIN TIME: 34.5 SECONDS (ref 11.8–14.5)
RBC # BLD AUTO: 4.96 X10(6)UL (ref 3.8–5.8)
SODIUM SERPL-SCNC: 141 MMOL/L (ref 136–145)
WBC # BLD AUTO: 7.2 X10(3) UL (ref 4–11)

## 2020-09-03 PROCEDURE — 99225 SUBSEQUENT OBSERVATION CARE: CPT | Performed by: HOSPITALIST

## 2020-09-03 NOTE — PROGRESS NOTES
[facility]    Progress Note    Tomy Salter Patient Status:  Observation    3/22/1955 MRN [id number]   Location [facility]5W Attending Josefina Aguilar MD   Hosp Day # 0 PCP Shailesh Bartlett MD       Subjective:     No compl compared with ECG of 08/03/2020 12:39:39 rate slower Electronically signed on 09/01/2020 at 17:02 by Adin Bustillos and Plan:     ASSESSMENT AND PLAN: Andrew Marcano is a 26-year-old  male with past medical history of multiple comorbid c

## 2020-09-03 NOTE — PLAN OF CARE
Problem: PAIN - ADULT  Goal: Verbalizes/displays adequate comfort level or patient's stated pain goal  Description  INTERVENTIONS:  - Encourage pt to monitor pain and request assistance  - Assess pain using appropriate pain scale  - Administer analgesics ambulating w/ or w/o assistive devices  - Assist with transfers and ambulation using safe patient handling equipment as needed  - Ensure adequate protection for wounds/incisions during mobilization  - Obtain PT/OT consults as needed  - Advance activity as

## 2020-09-03 NOTE — CM/SW NOTE
9/3: Per Reed guzman RN, stable for discharge. Updates sent to [facility]. Spoke with [facility] liaison ([phone]), she will review. Patient will need insurance authorization prior to discharge.      1245pm: Per Marta escobar/ AILEEN, patient accepted

## 2020-09-03 NOTE — PLAN OF CARE
Problem: Patient Centered Care  Goal: Patient preferences are identified and integrated in the patient's plan of care  Description  Interventions:  - What would you like us to know as we care for you?   - Provide timely, complete, and accurate informatio for physical needs  - Identify cognitive and physical deficits and behaviors that affect risk of falls.   - [place] fall precautions as indicated by assessment.  - Educate pt/family on patient safety including physical limitations  - Instruct pt to call f and encourage patient to participate in ADLs to maximize function  - Promote sitting position while performing ADLs such as feeding, grooming, and bathing  - Educate and encourage patient/family in tolerated functional activity level and precautions during

## 2020-09-04 LAB
INR BLD: 2.4 (ref 0.9–1.2)
PROTHROMBIN TIME: 25.8 SECONDS (ref 11.8–14.5)

## 2020-09-04 PROCEDURE — 99226 SUBSEQUENT OBSERVATION CARE: CPT | Performed by: HOSPITALIST

## 2020-09-04 RX ORDER — WARFARIN SODIUM 4 MG/1
4 TABLET ORAL NIGHTLY
Refills: 0 | Status: SHIPPED | COMMUNITY
Start: 2020-09-04 | End: 2020-09-08

## 2020-09-04 RX ORDER — CARVEDILOL 12.5 MG/1
12.5 TABLET ORAL 2 TIMES DAILY WITH MEALS
Status: DISCONTINUED | OUTPATIENT
Start: 2020-09-04 | End: 2020-09-05

## 2020-09-04 RX ORDER — SPIRONOLACTONE 25 MG/1
12.5 TABLET ORAL DAILY
Status: DISCONTINUED | OUTPATIENT
Start: 2020-09-05 | End: 2020-09-08

## 2020-09-04 RX ORDER — SPIRONOLACTONE 25 MG/1
12.5 TABLET ORAL DAILY
Refills: 0 | Status: SHIPPED | COMMUNITY
Start: 2020-09-04 | End: 2020-09-30

## 2020-09-04 RX ORDER — CARVEDILOL 25 MG/1
12.5 TABLET ORAL 2 TIMES DAILY WITH MEALS
Qty: 180 TABLET | Refills: 1 | Status: SHIPPED | COMMUNITY
Start: 2020-09-04 | End: 2020-09-08

## 2020-09-04 RX ORDER — WARFARIN SODIUM 2 MG/1
4 TABLET ORAL NIGHTLY
Status: DISCONTINUED | OUTPATIENT
Start: 2020-09-04 | End: 2020-09-05

## 2020-09-04 NOTE — PLAN OF CARE
Problem: Patient Centered Care  Goal: Patient preferences are identified and integrated in the patient's plan of care  Description  Interventions:  - What would you like us to know as we care for you?   - Provide timely, complete, and accurate informatio for physical needs  - Identify cognitive and physical deficits and behaviors that affect risk of falls.   - [place] fall precautions as indicated by assessment.  - Educate pt/family on patient safety including physical limitations  - Instruct pt to call f and encourage patient to participate in ADLs to maximize function  - Promote sitting position while performing ADLs such as feeding, grooming, and bathing  - Educate and encourage patient/family in tolerated functional activity level and precautions during

## 2020-09-04 NOTE — PLAN OF CARE
Problem: Patient Centered Care  Goal: Patient preferences are identified and integrated in the patient's plan of care  Description  Interventions:  - What would you like us to know as we care for you?  - Provide timely, complete, and accurate information for physical needs  - Identify cognitive and physical deficits and behaviors that affect risk of falls.   - [place] fall precautions as indicated by assessment.  - Educate pt/family on patient safety including physical limitations  - Instruct pt to call f and encourage patient to participate in ADLs to maximize function  - Promote sitting position while performing ADLs such as feeding, grooming, and bathing  - Educate and encourage patient/family in tolerated functional activity level and precautions during

## 2020-09-04 NOTE — PROGRESS NOTES
[facility]    Progress Note    Franki De Jesus Patient Status:  Observation    3/22/1955 MRN [id number]   Location [facility]5W Attending Carla Anthony MD   Hosp Day # 0 PCP Princess Linda MD       Subjective:     No CP or awaiting transfer to rehab.  - keep off of prednisone     Hx of trial fibrillation, on anticoagulation with Coumadin.    INR was supra-therapeutic. INR therapeutic today.   - resume coumadin at 4 mg qhs  - daily INR  - cont coreg  - cont digoxin     Hx of

## 2020-09-05 LAB
INR BLD: 1.71 (ref 0.9–1.2)
PROTHROMBIN TIME: 19.8 SECONDS (ref 11.8–14.5)

## 2020-09-05 PROCEDURE — 99226 SUBSEQUENT OBSERVATION CARE: CPT | Performed by: HOSPITALIST

## 2020-09-05 RX ORDER — WARFARIN SODIUM 7.5 MG/1
7.5 TABLET ORAL NIGHTLY
Status: DISCONTINUED | OUTPATIENT
Start: 2020-09-05 | End: 2020-09-08

## 2020-09-05 RX ORDER — CARVEDILOL 3.12 MG/1
3.12 TABLET ORAL 2 TIMES DAILY WITH MEALS
Status: DISCONTINUED | OUTPATIENT
Start: 2020-09-05 | End: 2020-09-08

## 2020-09-05 RX ORDER — WARFARIN SODIUM 5 MG/1
5 TABLET ORAL NIGHTLY
Status: DISCONTINUED | OUTPATIENT
Start: 2020-09-05 | End: 2020-09-05

## 2020-09-05 NOTE — PLAN OF CARE
Received patient alert and verbal. Vital signs stable. No s/s hyper/hypoglycemia. Ambulated safely in room with wife at bedside. No complaints of pain or discomfort.        Problem: PAIN - ADULT  Goal: Verbalizes/displays adequate comfort level or patient's

## 2020-09-05 NOTE — PROGRESS NOTES
[facility]    Progress Note    Shaggy Yan Patient Status:  Observation    3/22/1955 MRN [id number]   Location [facility]5W Attending Pratima Dash MD   Hosp Day # 0 PCP La Restrepo MD       Subjective:     No CP or transfer to rehab.  - keep off of prednisone     Hx of trial fibrillation, on anticoagulation with Coumadin.    INR was supra-therapeutic. INR low today.   - increase coumadin to 7.5 mg qhs  - daily INR  - cont coreg  - cont digoxin     Hx of HTN  BP low t

## 2020-09-05 NOTE — PHYSICAL THERAPY NOTE
PHYSICAL THERAPY TREATMENT NOTE - INPATIENT     Room Number: 688/572-W       Presenting Problem: polyarthralgia, myopathy weakness    Problem List  Principal Problem:    Unable to walk  Active Problems:    Polyarthralgia      PHYSICAL THERAPY ASSESSMENT __1__  Attempts to Rise _2___  Immediate Standing Balance (first 5 seconds) __2__  Standing Balance __2__   Nudged __2__  Eyes Closed __1__  Turning 360 Degrees _0_/_1_  Sitting Down __1___  Total Balance _13___/16    Gait Assessment  Initiation __1__  Lui Moura Assistance: Contact guard assist  Distance (ft): 200  Assistive Device: Rolling walker  Pattern: (wide MICHAEL)  Stoop/Curb Assistance: Not tested  Comment : Up to toilet than chair        Patient End of Session: In bed;Needs met;Call light within reach;RN zak

## 2020-09-05 NOTE — PLAN OF CARE
Problem: Patient Centered Care  Goal: Patient preferences are identified and integrated in the patient's plan of care  Description  Interventions:  - What would you like us to know as we care for you?  Wants to walk in the hallway  - Provide timely, compl assessment.  - Educate pt/family on patient safety including physical limitations  - Instruct pt to call for assistance with activity based on assessment  - Modify environment to reduce risk of injury  - Provide assistive devices as appropriate  - Consider patient/family  Outcome: Not Progressing     Problem: Impaired Functional Mobility  Goal: Achieve highest/safest level of mobility/gait  Description  Interventions:  - Assess patient's functional ability and stability  - Promote increasing activity/toleran

## 2020-09-05 NOTE — CM/SW NOTE
[facility] to check status of insurance auth. Per Vu, [facility], they do not have insurance auth yet.  She will notify SW/CM when they have auth    / to remain available for support and/or discharge plannin

## 2020-09-06 LAB
ANION GAP SERPL CALC-SCNC: 6 MMOL/L (ref 0–18)
BUN BLD-MCNC: 12 MG/DL (ref 7–18)
BUN/CREAT SERPL: 14 (ref 10–20)
CALCIUM BLD-MCNC: 9.2 MG/DL (ref 8.5–10.1)
CHLORIDE SERPL-SCNC: 108 MMOL/L (ref 98–112)
CO2 SERPL-SCNC: 26 MMOL/L (ref 21–32)
CREAT BLD-MCNC: 0.86 MG/DL (ref 0.7–1.3)
GLUCOSE BLD-MCNC: 107 MG/DL (ref 70–99)
INR BLD: 1.42 (ref 0.9–1.2)
OSMOLALITY SERPL CALC.SUM OF ELEC: 290 MOSM/KG (ref 275–295)
POTASSIUM SERPL-SCNC: 4 MMOL/L (ref 3.5–5.1)
PROTHROMBIN TIME: 17.1 SECONDS (ref 11.8–14.5)
SODIUM SERPL-SCNC: 140 MMOL/L (ref 136–145)

## 2020-09-06 PROCEDURE — 99225 SUBSEQUENT OBSERVATION CARE: CPT | Performed by: HOSPITALIST

## 2020-09-06 RX ORDER — ACETAMINOPHEN 325 MG/1
650 TABLET ORAL EVERY 6 HOURS PRN
Status: DISCONTINUED | OUTPATIENT
Start: 2020-09-06 | End: 2020-09-08

## 2020-09-06 NOTE — PLAN OF CARE
Problem: Patient Centered Care  Goal: Patient preferences are identified and integrated in the patient's plan of care  Description  Interventions:  - What would you like us to know as we care for you?  Wants to walk in the hallway  - Provide timely, compl ADULT - FALL  Goal: Free from fall injury  Description  INTERVENTIONS:  - Assess pt frequently for physical needs  - Identify cognitive and physical deficits and behaviors that affect risk of falls. - [place] fall precautions as indicated by assessment. highest/safest level of independence in self care  Description  Interventions:  - Assess ability and encourage patient to participate in ADLs to maximize function  - Promote sitting position while performing ADLs such as feeding, grooming, and bathing  - E

## 2020-09-06 NOTE — PROGRESS NOTES
[facility]    Progress Note    Franki De Jesus Patient Status:  Observation    3/22/1955 MRN [id number]   Location [facility]5W Attending Carla Anthony MD   Hosp Day # 0 PCP Princess Linda MD       Subjective:     No compl rehab.  - keep off of prednisone     Hx of atrial fibrillation, on anticoagulation with Coumadin.    INR was supra-therapeutic. INR low today. - cont coumadin at 7.5 mg qhs  - daily INR  - cont coreg  - cont digoxin     Hx of HTN  BP has been low.   - con

## 2020-09-06 NOTE — PLAN OF CARE
Received patient alert and verbal. Vital signs stable. Pt still experiencing weakness. Safety in place with bed alarm on, non skid socks on, call light in place. No complaints of pain or discomfort. Will continue to monitor.         Problem: PAIN - ADULT  G needed  Outcome: Progressing     Problem: MUSCULOSKELETAL - ADULT  Goal: Return mobility to safest level of function  Description  INTERVENTIONS:  - Assess patient stability and activity tolerance for standing, transferring and ambulating w/ or w/o assisti

## 2020-09-07 LAB
INR BLD: 1.92 (ref 0.9–1.2)
PROTHROMBIN TIME: 21.6 SECONDS (ref 11.8–14.5)

## 2020-09-07 PROCEDURE — 99225 SUBSEQUENT OBSERVATION CARE: CPT | Performed by: HOSPITALIST

## 2020-09-07 NOTE — PROGRESS NOTES
[facility]    Progress Note    Nelwyn Prashant Patient Status:  Observation    3/22/1955 MRN [id number]   Location [facility]5W Attending Zoya De Dios MD   Hosp Day # 0 PCP Yonatan Fink MD       Subjective:     No compl certainly \"off steroids. \"  - PT/OT.    - SW on consult.  Pt awaiting transfer to rehab.  - keep off of prednisone     Hx of atrial fibrillation, on anticoagulation with Coumadin.    INR was supra-therapeutic. INR low today.  1.92  - cont coumadin at 7.5

## 2020-09-07 NOTE — PLAN OF CARE
Problem: Patient Centered Care  Goal: Patient preferences are identified and integrated in the patient's plan of care  Description  Interventions:  - What would you like us to know as we care for you?  Wants to walk in the hallway  - Provide timely, compl injury  Description  INTERVENTIONS:  - Assess pt frequently for physical needs  - Identify cognitive and physical deficits and behaviors that affect risk of falls.   - [place] fall precautions as indicated by assessment.  - Educate pt/family on patient sa self care  Description  Interventions:  - Assess ability and encourage patient to participate in ADLs to maximize function  - Promote sitting position while performing ADLs such as feeding, grooming, and bathing  - Educate and encourage patient/family in t

## 2020-09-07 NOTE — PLAN OF CARE
Received patient alert and verbal. Patient still experiencing weakness. Ambulating safely in room with assistance when appropriate. Skin remains intact. Tolerating CPAP at HS well. No complaints of pain or discomfort.        Problem: PAIN - ADULT  Goal: Dajuan needed  Outcome: Progressing     Problem: MUSCULOSKELETAL - ADULT  Goal: Return mobility to safest level of function  Description  INTERVENTIONS:  - Assess patient stability and activity tolerance for standing, transferring and ambulating w/ or w/o assisti

## 2020-09-08 VITALS
HEIGHT: 76.5 IN | WEIGHT: 272.5 LBS | SYSTOLIC BLOOD PRESSURE: 108 MMHG | BODY MASS INDEX: 32.84 KG/M2 | DIASTOLIC BLOOD PRESSURE: 73 MMHG | RESPIRATION RATE: 20 BRPM | OXYGEN SATURATION: 96 % | HEART RATE: 87 BPM | TEMPERATURE: 98 F

## 2020-09-08 PROCEDURE — 99217 OBSERVATION CARE DISCHARGE: CPT | Performed by: HOSPITALIST

## 2020-09-08 RX ORDER — CARVEDILOL 3.12 MG/1
3.12 TABLET ORAL 2 TIMES DAILY WITH MEALS
Refills: 0 | Status: SHIPPED | COMMUNITY
Start: 2020-09-08 | End: 2020-10-21

## 2020-09-08 RX ORDER — WARFARIN SODIUM 7.5 MG/1
7.5 TABLET ORAL NIGHTLY
Refills: 0 | Status: SHIPPED | COMMUNITY
Start: 2020-09-08 | End: 2020-10-21

## 2020-09-08 NOTE — PHYSICAL THERAPY NOTE
PHYSICAL THERAPY TREATMENT NOTE - INPATIENT     Room Number: 576/376-R       Presenting Problem: polyarthralgia, myopathy weakness    Problem List  Principal Problem:    Unable to walk  Active Problems:    Polyarthralgia      PHYSICAL THERAPY ASSESSMENT __1___  Total Balance _13___/16    Gait Assessment  Initiation __1__  Step length and height:   R___1_/_1___  L _1____/__1_  Step Symmetry _1___  Step Continuity __1__  Path __1__  Trunk __0__  Stance __0__  Total Gait _8_/12    Total Score ___21__/28    T End of Session: In bed;Call light within reach;RN aware of session/findings;Bracing education provided; All patient questions and concerns addressed    CURRENT GOALS     Goals to be met by: 2 wks  Patient Goal Patient's self-stated goal is: return home   Go

## 2020-09-08 NOTE — CM/SW NOTE
Discharge Planning:    Message left for Jaime Showers [facility] liaison, [phone] to see if [facility] has insurance auth. Await callback. / to remain available for support and/or discharge planning.      Addendum:  Confirmed with Boby Santos

## 2020-09-08 NOTE — PROGRESS NOTES
[facility]    Progress Note    Chante Kasper Patient Status:  Observation    3/22/1955 MRN [id number]   Location [facility]5W Attending Smith Azevedo MD   Hosp Day # 0 PCP Cristal Gibson MD       Subjective:     No CP or awaiting transfer to rehab.  - keep off of prednisone     Hx of atrial fibrillation, on anticoagulation with Coumadin.    INR was supra-therapeutic. INR near therapeutic today.   - cont coumadin at 7.5 mg qhs  - daily INR  - cont coreg  - cont digoxin

## 2020-09-08 NOTE — PLAN OF CARE
Problem: Patient Centered Care  Goal: Patient preferences are identified and integrated in the patient's plan of care  Description  Interventions:  - What would you like us to know as we care for you?  Wants to walk in the hallway  - Provide timely, compl injury  Description  INTERVENTIONS:  - Assess pt frequently for physical needs  - Identify cognitive and physical deficits and behaviors that affect risk of falls.   - [place] fall precautions as indicated by assessment.  - Educate pt/family on patient sa self care  Description  Interventions:  - Assess ability and encourage patient to participate in ADLs to maximize function  - Promote sitting position while performing ADLs such as feeding, grooming, and bathing  - Educate and encourage patient/family in t

## 2020-09-08 NOTE — OCCUPATIONAL THERAPY NOTE
OCCUPATIONAL THERAPY TREATMENT NOTE - INPATIENT        Room Number: 874/036-E           Presenting Problem: (inability to get OOB)    Problem List  Principal Problem:    Unable to walk  Active Problems:    Polyarthralgia      OCCUPATIONAL THERAPY ASSESSMEN OBJECTIVE  Precautions: Bed/chair alarm    WEIGHT BEARING RESTRICTION  Weight Bearing Restriction: None                PAIN ASSESSMENT  Rating: 10  Location: B shoulders, knees.  (Pt talking calmly and ambulating while reporting this pain r)  Management minutes in prep for adls with SBA  Comment: Progressing    Patient will complete LE dressing using LHAE with min A 1x  Comment: Progressing            Goals  on: 20  Frequency: 3x/week    Castro Bean MA, OTR/L  Occupational Therapist

## 2020-09-08 NOTE — DISCHARGE SUMMARY
[place] [facility]    Discharge Summary    Shaggy Yan Patient Status:  Observation    3/22/1955 MRN [id number]   Location [facility]5W Attending No att. providers found   [facility] PCP La Restrepo MD     Date of Admissio hypertension     Hospital discharge follow-up     Unable to walk     Polyarthralgia      Physical Exam:      /73 (BP Location: Right arm)   Pulse 87   Temp 97.8 °F (36.6 °C) (Oral)   Resp 20   Ht 6' 4.5\" (1.943 m)   Wt 272 lb 7.8 oz (123.6 kg)   SpO facility.   Patient will need a formal physical therapy evaluation for insurance approval.    Hospital Course:     ASSESSMENT AND PLAN:   Patient is a 42-year-old  male with past medical history of multiple comorbid conditions and multiple recent a mouth daily. Refills:  0     Warfarin Sodium 7.5 MG Tabs  Commonly known as:  COUMADIN  What changed:    · medication strength  · See the new instructions. Take as directed.  If you are unsure how to take this medication, talk to your nurse or doctor

## 2020-09-08 NOTE — PLAN OF CARE
Problem: Patient Centered Care  Goal: Patient preferences are identified and integrated in the patient's plan of care  Description  Interventions:  - What would you like us to know as we care for you?  Wants to walk in the hallway  - Provide timely, compl Free from fall injury  Description  INTERVENTIONS:  - Assess pt frequently for physical needs  - Identify cognitive and physical deficits and behaviors that affect risk of falls.   - [place] fall precautions as indicated by assessment.  - Educate pt/famil Living  Goal: Achieve highest/safest level of independence in self care  Description  Interventions:  - Assess ability and encourage patient to participate in ADLs to maximize function  - Promote sitting position while performing ADLs such as feeding, groo

## 2020-10-01 ENCOUNTER — TELEPHONE (OUTPATIENT)
Dept: INTERNAL MEDICINE CLINIC | Facility: CLINIC | Age: 65
End: 2020-10-01

## 2020-10-01 ENCOUNTER — ANTI-COAG VISIT (OUTPATIENT)
Dept: INTERNAL MEDICINE CLINIC | Facility: CLINIC | Age: 65
End: 2020-10-01
Payer: COMMERCIAL

## 2020-10-01 DIAGNOSIS — Z51.81 ENCOUNTER FOR THERAPEUTIC DRUG MONITORING: ICD-10-CM

## 2020-10-01 DIAGNOSIS — Z79.01 LONG TERM (CURRENT) USE OF ANTICOAGULANTS: ICD-10-CM

## 2020-10-01 DIAGNOSIS — I48.20 CHRONIC ATRIAL FIBRILLATION (HCC): ICD-10-CM

## 2020-10-01 PROCEDURE — 36416 COLLJ CAPILLARY BLOOD SPEC: CPT

## 2020-10-01 PROCEDURE — 85610 PROTHROMBIN TIME: CPT

## 2020-10-01 NOTE — TELEPHONE ENCOUNTER
Took Warfarin 2 days ago  Was discharged 9/30 from [facility] and states his INR was 3.46 two days a go and was told to hold it for 2 days,    Scheduled him to come in today at 1:30 for INR

## 2020-10-01 NOTE — TELEPHONE ENCOUNTER
Patient called, wanting to schedule an appointment. He, also, stated that he has not had coumadin in two days and had some questions for a nurse in the coumadin clinic.

## 2020-10-01 NOTE — TELEPHONE ENCOUNTER
Pt's spouse, Baldemar  (LILLIE in place), states pt was released from rehab facility, [facility], yesterday and was advised to have home health services for physical therapy. Pt informed spouse someone is supposed to contact pt for home health services. Spouse states she will call back if orders needed from Dr. Smita Henderson after pt receives information for home health services. Pt does have hospital discharge follow up appt scheduled 10/12/2020.

## 2020-10-12 ENCOUNTER — OFFICE VISIT (OUTPATIENT)
Dept: INTERNAL MEDICINE CLINIC | Facility: CLINIC | Age: 65
End: 2020-10-12
Payer: COMMERCIAL

## 2020-10-12 VITALS
DIASTOLIC BLOOD PRESSURE: 84 MMHG | SYSTOLIC BLOOD PRESSURE: 115 MMHG | RESPIRATION RATE: 16 BRPM | HEIGHT: 76 IN | WEIGHT: 264 LBS | BODY MASS INDEX: 32.15 KG/M2 | HEART RATE: 58 BPM

## 2020-10-12 DIAGNOSIS — F32.89 OTHER DEPRESSION: ICD-10-CM

## 2020-10-12 DIAGNOSIS — I50.23 ACUTE ON CHRONIC SYSTOLIC HEART FAILURE (HCC): ICD-10-CM

## 2020-10-12 DIAGNOSIS — R53.1 WEAKNESS: Primary | ICD-10-CM

## 2020-10-12 PROCEDURE — 99214 OFFICE O/P EST MOD 30 MIN: CPT | Performed by: INTERNAL MEDICINE

## 2020-10-12 PROCEDURE — 1111F DSCHRG MED/CURRENT MED MERGE: CPT | Performed by: INTERNAL MEDICINE

## 2020-10-12 PROCEDURE — 3074F SYST BP LT 130 MM HG: CPT | Performed by: INTERNAL MEDICINE

## 2020-10-12 PROCEDURE — 3008F BODY MASS INDEX DOCD: CPT | Performed by: INTERNAL MEDICINE

## 2020-10-12 PROCEDURE — 99212 OFFICE O/P EST SF 10 MIN: CPT | Performed by: INTERNAL MEDICINE

## 2020-10-12 NOTE — PROGRESS NOTES
Juan A Hoang is a 76year old male. HPI:   1. Weakness    Went to ER with difficulty walking. Had been in hospital with CHF and swelling and meds adjusted. Needed in home therapy and had not had this fdollow up done.  Was in [facility] [place] [facility] and is still weak Candace Walker repair 2005   • Obesity    • Pre-diabetes    • Umbilical hernia 6202    repair   • Varicose veins 2013    Bilateral vein stripping 1993   • Venous stasis ulcer ([facility] [address] 2013    vein clinics   • Vitamin D deficiency    • Wrist f physical therapy program.  If he desires I can set up outpatient physical therapy at the hospital or other locations to help motivate him to get back on his feet.     2. Acute on chronic systolic heart failure (HCC)    Continue low salt diet and 1500 cc flu

## 2020-10-13 ENCOUNTER — TELEPHONE (OUTPATIENT)
Dept: INTERNAL MEDICINE CLINIC | Facility: CLINIC | Age: 65
End: 2020-10-13

## 2020-10-13 NOTE — TELEPHONE ENCOUNTER
Patient called and advised he needs a Coumadin Nurse to give him a call back to schedule an appointment. Please advise.

## 2020-10-15 ENCOUNTER — ANTI-COAG VISIT (OUTPATIENT)
Dept: INTERNAL MEDICINE CLINIC | Facility: CLINIC | Age: 65
End: 2020-10-15
Payer: COMMERCIAL

## 2020-10-15 ENCOUNTER — TELEPHONE (OUTPATIENT)
Dept: INTERNAL MEDICINE CLINIC | Facility: CLINIC | Age: 65
End: 2020-10-15

## 2020-10-15 DIAGNOSIS — I48.20 CHRONIC ATRIAL FIBRILLATION (HCC): ICD-10-CM

## 2020-10-15 DIAGNOSIS — Z51.81 ENCOUNTER FOR THERAPEUTIC DRUG MONITORING: ICD-10-CM

## 2020-10-15 DIAGNOSIS — Z79.01 LONG TERM (CURRENT) USE OF ANTICOAGULANTS: ICD-10-CM

## 2020-10-15 PROCEDURE — 85610 PROTHROMBIN TIME: CPT

## 2020-10-15 PROCEDURE — 36416 COLLJ CAPILLARY BLOOD SPEC: CPT

## 2020-10-15 NOTE — TELEPHONE ENCOUNTER
Patient calling and he have a appointment today at 1:45 trying to see if he can come in like 2 or 1 hour earlier if not he will keep his appointment please let him know      Please advise  [phone]

## 2020-11-03 ENCOUNTER — ANTI-COAG VISIT (OUTPATIENT)
Dept: INTERNAL MEDICINE CLINIC | Facility: CLINIC | Age: 65
End: 2020-11-03
Payer: COMMERCIAL

## 2020-11-03 DIAGNOSIS — Z79.01 LONG TERM (CURRENT) USE OF ANTICOAGULANTS: ICD-10-CM

## 2020-11-03 DIAGNOSIS — I48.20 CHRONIC ATRIAL FIBRILLATION (HCC): ICD-10-CM

## 2020-11-03 DIAGNOSIS — Z51.81 ENCOUNTER FOR THERAPEUTIC DRUG MONITORING: ICD-10-CM

## 2020-11-03 PROCEDURE — 85610 PROTHROMBIN TIME: CPT

## 2020-11-03 PROCEDURE — 36416 COLLJ CAPILLARY BLOOD SPEC: CPT

## 2020-11-09 ENCOUNTER — OFFICE VISIT (OUTPATIENT)
Dept: INTERNAL MEDICINE CLINIC | Facility: CLINIC | Age: 65
End: 2020-11-09
Payer: COMMERCIAL

## 2020-11-09 VITALS
SYSTOLIC BLOOD PRESSURE: 103 MMHG | BODY MASS INDEX: 31.08 KG/M2 | WEIGHT: 250 LBS | HEART RATE: 52 BPM | DIASTOLIC BLOOD PRESSURE: 79 MMHG | HEIGHT: 75 IN | RESPIRATION RATE: 16 BRPM

## 2020-11-09 DIAGNOSIS — I50.23 ACUTE ON CHRONIC SYSTOLIC HEART FAILURE (HCC): ICD-10-CM

## 2020-11-09 DIAGNOSIS — R53.1 WEAKNESS: Primary | ICD-10-CM

## 2020-11-09 DIAGNOSIS — G47.33 OSA (OBSTRUCTIVE SLEEP APNEA): ICD-10-CM

## 2020-11-09 PROCEDURE — 99212 OFFICE O/P EST SF 10 MIN: CPT | Performed by: INTERNAL MEDICINE

## 2020-11-09 PROCEDURE — 3074F SYST BP LT 130 MM HG: CPT | Performed by: INTERNAL MEDICINE

## 2020-11-09 PROCEDURE — 3078F DIAST BP <80 MM HG: CPT | Performed by: INTERNAL MEDICINE

## 2020-11-09 PROCEDURE — 3008F BODY MASS INDEX DOCD: CPT | Performed by: INTERNAL MEDICINE

## 2020-11-09 PROCEDURE — 99214 OFFICE O/P EST MOD 30 MIN: CPT | Performed by: INTERNAL MEDICINE
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
## 2020-11-13 PROBLEM — M17.12 PRIMARY OSTEOARTHRITIS OF LEFT KNEE: Status: ACTIVE | Noted: 2020-11-13

## 2020-11-17 ENCOUNTER — TELEPHONE (OUTPATIENT)
Dept: INTERNAL MEDICINE CLINIC | Facility: CLINIC | Age: 65
End: 2020-11-17

## 2020-11-17 NOTE — TELEPHONE ENCOUNTER
Called patient as he missed [address] [employer] appointment earlier today. Asked him to return call to [employer] to reschedule.

## 2020-11-30 ENCOUNTER — TELEPHONE (OUTPATIENT)
Dept: INTERNAL MEDICINE CLINIC | Facility: CLINIC | Age: 65
End: 2020-11-30

## 2020-11-30 NOTE — TELEPHONE ENCOUNTER
Charito López as he missed his [facility] appointment this morning. Will need to get him set up with Fernando Guadalupe so that he will receive automated reminder calls. Left voice message and let him know there are open slots this afternoon or he may reschedule for tomorrow.

## 2020-12-04 ENCOUNTER — ANTI-COAG VISIT (OUTPATIENT)
Dept: INTERNAL MEDICINE CLINIC | Facility: CLINIC | Age: 65
End: 2020-12-04
Payer: COMMERCIAL

## 2020-12-04 DIAGNOSIS — Z79.01 LONG TERM (CURRENT) USE OF ANTICOAGULANTS: ICD-10-CM

## 2020-12-04 DIAGNOSIS — Z51.81 ENCOUNTER FOR THERAPEUTIC DRUG MONITORING: ICD-10-CM

## 2020-12-04 DIAGNOSIS — I48.20 CHRONIC ATRIAL FIBRILLATION (HCC): ICD-10-CM

## 2020-12-04 PROCEDURE — 36416 COLLJ CAPILLARY BLOOD SPEC: CPT

## 2020-12-04 PROCEDURE — 85610 PROTHROMBIN TIME: CPT

## 2021-01-06 ENCOUNTER — ANTI-COAG VISIT (OUTPATIENT)
Dept: INTERNAL MEDICINE CLINIC | Facility: CLINIC | Age: 66
End: 2021-01-06
Payer: COMMERCIAL

## 2021-01-06 DIAGNOSIS — Z51.81 ENCOUNTER FOR THERAPEUTIC DRUG MONITORING: ICD-10-CM

## 2021-01-06 DIAGNOSIS — Z79.01 LONG TERM (CURRENT) USE OF ANTICOAGULANTS: ICD-10-CM

## 2021-01-06 DIAGNOSIS — I48.20 CHRONIC ATRIAL FIBRILLATION (HCC): ICD-10-CM

## 2021-01-06 LAB
INR: 1.6 (ref 0.8–1.2)
TEST STRIP EXPIRATION DATE: ABNORMAL DATE

## 2021-01-06 PROCEDURE — 85610 PROTHROMBIN TIME: CPT

## 2021-01-06 PROCEDURE — 93793 ANTICOAG MGMT PT WARFARIN: CPT

## 2021-01-06 PROCEDURE — 36416 COLLJ CAPILLARY BLOOD SPEC: CPT

## 2021-01-11 ENCOUNTER — OFFICE VISIT (OUTPATIENT)
Dept: INTERNAL MEDICINE CLINIC | Facility: CLINIC | Age: 66
End: 2021-01-11
Payer: COMMERCIAL

## 2021-01-11 VITALS
WEIGHT: 259 LBS | SYSTOLIC BLOOD PRESSURE: 113 MMHG | DIASTOLIC BLOOD PRESSURE: 77 MMHG | HEIGHT: 75 IN | BODY MASS INDEX: 32.2 KG/M2 | RESPIRATION RATE: 16 BRPM | HEART RATE: 77 BPM

## 2021-01-11 DIAGNOSIS — I50.23 ACUTE ON CHRONIC SYSTOLIC HEART FAILURE (HCC): ICD-10-CM

## 2021-01-11 DIAGNOSIS — R53.1 WEAKNESS: Primary | ICD-10-CM

## 2021-01-11 DIAGNOSIS — F32.89 OTHER DEPRESSION: ICD-10-CM

## 2021-01-11 PROBLEM — F32.A DEPRESSION: Status: ACTIVE | Noted: 2021-01-11

## 2021-01-11 PROCEDURE — 3074F SYST BP LT 130 MM HG: CPT | Performed by: INTERNAL MEDICINE

## 2021-01-11 PROCEDURE — 3008F BODY MASS INDEX DOCD: CPT | Performed by: INTERNAL MEDICINE

## 2021-01-11 PROCEDURE — 3078F DIAST BP <80 MM HG: CPT | Performed by: INTERNAL MEDICINE

## 2021-01-11 PROCEDURE — 99214 OFFICE O/P EST MOD 30 MIN: CPT | Performed by: INTERNAL MEDICINE

## 2021-01-11 RX ORDER — ACETAMINOPHEN AND CODEINE PHOSPHATE 300; 30 MG/1; MG/1
1 TABLET ORAL EVERY 4 HOURS PRN
Qty: 40 TABLET | Refills: 0 | Status: SHIPPED | OUTPATIENT
Start: 2021-01-11 | End: 2021-09-17

## 2021-01-11 RX ORDER — ESCITALOPRAM OXALATE 10 MG/1
10 TABLET ORAL DAILY
Qty: 30 TABLET | Refills: 2 | Status: SHIPPED | OUTPATIENT
Start: 2021-01-11 | End: 2021-05-05
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
## 2021-02-01 DIAGNOSIS — Z23 NEED FOR VACCINATION: ICD-10-CM

## 2021-02-03 ENCOUNTER — TELEPHONE (OUTPATIENT)
Dept: INTERNAL MEDICINE CLINIC | Facility: CLINIC | Age: 66
End: 2021-02-03

## 2021-02-03 NOTE — TELEPHONE ENCOUNTER
Detailed message left (HIPAA verified) for Marcos Butt as he missed [facility] appointment scheduled for this morning. My Chart reminder was sent but not read. Let him know there is an opening today at 1pm or he may reschedule for a different day.  Number left to

## 2021-02-21 NOTE — PHYSICAL THERAPY NOTE
Emergency Department Attending Note    Patient: Moises Kaiser Age: 61 year old Sex: male   MRN: [id number] : 1960 Encounter Date: 2021     CC:   Chief Complaint   Patient presents with   • Vomiting   • Palpitations       HISTORY OF PRESENT ILLNESS  This is a 61 year old male with past medical history significant for hypertension, hyperlipidemia, cataract status post repair, GERD, anxiety presents for evaluation of palpitations.  Patient reports palpitations been ongoing for several months, mostly occur at night.  He denies any exacerbating or relieving factors.  He reports episodes last several seconds.  Denies any other associated symptoms with this.  The patient also reports he noticed some right eye flashes, reports he has noticed 2-3 episodes in the past month but noticed slight increase in frequency 2 days ago.  No other vision changes.  No diplopia or blurry vision.  No other associated symptoms, no other exacerbating leading factors.  He denies any fevers, chills, nausea, vomiting, chest pain, shortness breath, cough, abdominal pain, diarrhea, bloody stool, dysuria, hematuria, headaches, neck pain, back pain.  No other complaints.    Social History: no etoh/tobacco/drugs    REVIEW OF SYSTEMS  Review of Systems   Constitutional: Negative for chills, fatigue and fever.   HENT: Negative for congestion, ear pain and sore throat.    Eyes: Positive for visual disturbance. Negative for pain, discharge and redness.   Respiratory: Negative for cough, shortness of breath and wheezing.    Cardiovascular: Positive for palpitations. Negative for chest pain and leg swelling.   Gastrointestinal: Negative for abdominal distention, abdominal pain, blood in stool, diarrhea, nausea and vomiting.   Genitourinary: Negative for dysuria, flank pain and hematuria.   Musculoskeletal: Negative for arthralgias, back pain, neck pain and neck stiffness.   Skin: Negative for rash.   Neurological: Negative for  PHYSICAL THERAPY TREATMENT NOTE - INPATIENT     Room Number: 552/552-A       Presenting Problem: Pt admitted t ER w/ progressive weakness, LT knee pain and inabilit to walk for few weeks    Problem List  Principal Problem:    Myopathy  Active Problems: dizziness, facial asymmetry, speech difficulty, weakness, light-headedness, numbness and headaches.   Psychiatric/Behavioral: Negative for behavioral problems, self-injury and suicidal ideas.              PAST HISTORY           Past Medical History:   Diagnosis Date   • Cataracts, bilateral    • Essential hypertension 2/10/2021   • GERD (gastroesophageal reflux disease)    • Hyperlipidemia    • Prostate CA (CMS/HCC)    • Sarcoidosis     Past Surgical History:   Procedure Laterality Date   • CATARACT EXTRACTION, BILATERAL     • PROSTATE SURGERY        Additional PMH: Additional PSH:          Social History     Tobacco Use   • Smoking status: Never Smoker   • Smokeless tobacco: Never Used   Substance Use Topics   • Alcohol use: No     Frequency: Never   • Drug use: No    Family History   Problem Relation Age of Onset   • Hypertension Mother    • Hypertension Father    • Patient is unaware of any medical problems Sister       Additional SH: Additional FH:          Current Discharge Medication List      Prior to Admission Medications    Details   sertraline (ZOLOFT) 50 MG tablet Take 1 tablet by mouth daily. At bed time  Qty: 30 tablet, Refills: 3      metoPROLOL succinate (TOPROL-XL) 25 MG 24 hr tablet Take 1 tablet by mouth daily.  Qty: 30 tablet, Refills: 3      brimonidine (ALPHAGAN) 0.2 % ophthalmic solution APPLY 1 DROP IN BOTH EYES TWICE DAILY      omeprazole (PriLOSEC) 40 MG capsule Take 1 capsule by mouth daily. With the last meal of the day   Qty: 30 capsule, Refills: 0      latanoprost (XALATAN) 0.005 % ophthalmic solution       atorvastatin (LIPITOR) 40 MG tablet Take 1 tablet by mouth daily.  Qty: 90 tablet, Refills: 3      orphenadrine (NORFLEX) 100 MG extended release tablet Take 1 tablet by mouth at bedtime as needed for Muscle spasms.  Qty: 5 tablet, Refills: 0      sildenafil (VIAGRA) 100 MG tablet Take 1 tablet by mouth as needed for Erectile Dysfunction.  Qty: 10 tablet, Refills: 6           No Known  need to use rolling walker at this time. DISCHARGE RECOMMENDATIONS  PT Discharge Recommendations: 24 hour care/supervision;Acute rehabilitation     PLAN  PT Treatment Plan: Body mechanics; Bed mobility; Patient education;Gait training;Stair training;Khan Allergies       PHYSICAL EXAMINATION:  ED Triage Vitals [02/21/21 0751]   BP (!) 160/78   Heart Rate 78   Resp 16   Temp 97.2 °F (36.2 °C)   SpO2 96 %       Physical Exam  Vitals signs reviewed.   Constitutional:       General: He is not in acute distress.     Appearance: He is not ill-appearing.   HENT:      Head: Normocephalic and atraumatic.      Right Ear: Tympanic membrane normal.      Left Ear: Tympanic membrane normal.      Nose: Nose normal.      Mouth/Throat:      Mouth: Mucous membranes are moist.      Pharynx: Oropharynx is clear. No oropharyngeal exudate or posterior oropharyngeal erythema.   Eyes:      General: No scleral icterus.        Right eye: No discharge.         Left eye: No discharge.      Extraocular Movements: Extraocular movements intact.      Conjunctiva/sclera: Conjunctivae normal.      Pupils: Pupils are equal, round, and reactive to light.   Neck:      Musculoskeletal: Normal range of motion and neck supple. No neck rigidity or muscular tenderness.   Cardiovascular:      Rate and Rhythm: Normal rate and regular rhythm.      Pulses: Normal pulses.      Heart sounds: Normal heart sounds.   Pulmonary:      Effort: Pulmonary effort is normal. No respiratory distress.      Breath sounds: Normal breath sounds.   Abdominal:      General: Bowel sounds are normal. There is no distension.      Palpations: Abdomen is soft.      Tenderness: There is no abdominal tenderness. There is no right CVA tenderness, left CVA tenderness, guarding or rebound.   Musculoskeletal: Normal range of motion.         General: No swelling, tenderness, deformity or signs of injury.      Right lower leg: No edema.      Left lower leg: No edema.   Skin:     General: Skin is warm and dry.      Capillary Refill: Capillary refill takes less than 2 seconds.      Findings: No rash.   Neurological:      General: No focal deficit present.      Mental Status: He is alert and oriented to person, place, and time.      Cranial  Session: Up in chair;Needs met;Call light within reach;RN aware of session/findings; All patient questions and concerns addressed; Alarm set(bedside chair elevated )    CURRENT GOALS   Goals to be met by: 7.10.20  Patient Goal Patient's self-stated goal is:  Nerves: No cranial nerve deficit.      Sensory: No sensory deficit.      Motor: No weakness.      Coordination: Coordination normal.      Gait: Gait normal.   Psychiatric:         Mood and Affect: Mood normal.         Behavior: Behavior normal.         Thought Content: Thought content normal.         Labs:   Results for orders placed or performed during the hospital encounter of 02/21/21   Comprehensive Metabolic Panel   Result Value Ref Range    Fasting Status      Sodium 140 135 - 145 mmol/L    Potassium 3.7 3.4 - 5.1 mmol/L    Chloride 103 98 - 107 mmol/L    Carbon Dioxide 28 21 - 32 mmol/L    Anion Gap 13 10 - 20 mmol/L    Glucose 90 65 - 99 mg/dL    BUN 17 6 - 20 mg/dL    Creatinine 1.05 0.67 - 1.17 mg/dL    Glomerular Filtration Rate 88 (L) >90 mL/min/1.73m2    BUN/ Creatinine Ratio 16 7 - 25    Calcium 10.0 8.4 - 10.2 mg/dL    Bilirubin, Total 1.8 (H) 0.2 - 1.0 mg/dL    GOT/AST 24 <=37 Units/L    GPT/ALT 38 <64 Units/L    Alkaline Phosphatase 71 45 - 117 Units/L    Albumin 4.7 3.6 - 5.1 g/dL    Protein, Total 8.1 6.4 - 8.2 g/dL    Globulin 3.4 2.0 - 4.0 g/dL    A/G Ratio 1.4 1.0 - 2.4   Troponin I Ultra Sensitive   Result Value Ref Range    Troponin I, Ultra Sensitive <0.02 <=0.04 ng/mL   Lipase   Result Value Ref Range    Lipase 143 73 - 393 Units/L   CBC with Automated Differential (performable only)   Result Value Ref Range    WBC 5.2 4.2 - 11.0 K/mcL    RBC 5.14 4.50 - 5.90 mil/mcL    HGB 14.1 13.0 - 17.0 g/dL    HCT 42.8 39.0 - 51.0 %    MCV 83.3 78.0 - 100.0 fl    MCH 27.4 26.0 - 34.0 pg    MCHC 32.9 32.0 - 36.5 g/dL    RDW-CV 12.6 11.0 - 15.0 %    RDW-SD 38.0 (L) 39.0 - 50.0 fL     140 - 450 K/mcL    NRBC 0 <=0 /100 WBC    Neutrophil, Percent 70 %    Lymphocytes, Percent 21 %    Mono, Percent 8 %    Eosinophils, Percent 0 %    Basophils, Percent 1 %    Immature Granulocytes 0 %    Absolute Neutrophils 3.6 1.8 - 7.7 K/mcL    Absolute Lymphocytes 1.1 1.0 - 4.0 K/mcL    Absolute Monocytes 0.4 0.3 -  0.9 K/mcL    Absolute Eosinophils  0.0 0.0 - 0.5 K/mcL    Absolute Basophils 0.0 0.0 - 0.3 K/mcL    Absolute Immmature Granulocytes 0.0 0.0 - 0.2 K/mcL   Magnesium   Result Value Ref Range    Magnesium 1.9 1.7 - 2.4 mg/dL   Thyroid Stimulating Hormone Reflex   Result Value Ref Range    TSH 1.790 0.350 - 5.000 mcUnits/mL     Imaging:   US LIVER GALLBLADDER PANCREAS   Final Result      1.    Unremarkable right upper quadrant ultrasound.            Electronically Signed by: TREE MEADE MD    Signed on: 2/21/2021 10:18 AM          XR CHEST PA AND LATERAL 2 VIEWS   Final Result   FINDINGS/IMPRESSION:   Normal heart size.  Clear lungs.  No pneumothorax or effusion.  Intact   osseous structures for age.  Clips project over the right lung apex similar   to prior study.      Electronically Signed by: MARY CONDON    Signed on: 2/21/2021 9:02 AM                 MEDICAL DECISION MAKING:  This is a 61 year old male presents with several months of intermittent palpitations and a month of intermittent flashes in the right eye that he noticed more so over the past 2 days.  Patient is afebrile, hemodynamically stable nontoxic-appearing.  Will plan for labs, EKG, chest x-ray, ultrasound of the right eye, visual acuities and reevaluation.  Right eye exam unremarkable on initial physical exam.    EKG: Rate 63, normal sinus rhythm, normal axis, no ST or T wave ischemic changes,        ED Medication Orders (From admission, onward)    Ordered Start     Status Ordering Provider    02/21/21 0851 02/21/21 0852  alum-mag hydroxide+simethicone/lidocaine viscous (2:1) (MAGIC MOUTHWASH/GI COCKTAIL) (compounded) oral suspension 15 mL  ONCE      Last MAR action: Given JOSE SOLARES    02/21/21 0849 02/21/21 0850  ondansetron (ZOFRAN ODT) disintegrating tablet 4 mg  ONCE      Last MAR action: Given CANDACE JOHNSON    02/21/21 0849 02/21/21 0850  lactated ringers bolus 1,000 mL  ONCE      Last MAR action: New Bag JJ  CANDACE BERMUDEZ        Procedures    Reevaluation/Reexamination: Improving.  Labs reviewed, slight elevation of bilirubin but otherwise no acute findings.  Will proceed with right upper quadrant ultrasound to ensure no acute findings for the hyperbilirubinemia.  Chest x-ray no acute findings.  EKG nonischemic.  Bedside ultrasound of the right eye was performed, no evidence of retinal detachment.  Postoperative changes were noted from previous cataract surgery.  Visual acuity 20/100 in the right eye, 20/40 in the left eye.  No surrounding erythema or pain.  I discussed the patient with patient's ophthalmologist who also performed his cataract surgery Dr. Jes Valladares, she stated patient may follow-up in the clinic tomorrow, February 22 for further evaluation.  Patient already had an appointment scheduled for this Wednesday, Feb 24th. Will have patient f/u in ophthalmology clinic tomorrow, Friday 22nd for further evaluation management.    IMPRESSION AND PLAN:  Diagnosis: Right eye floater, palpitations, hyperbilirubinemia  Disposition: Discharge home    Teaching-Supervisory Addendum-Brief    I participated in the following activities of this patients care: the medical history, the physical exam, medical decision making, the procedure.    I personally performed: supervision of the patient's care, the medical history, the physical exam, the medical decision making.    The case was discussed with: the resident.    Procedures: I directly supervised the entire procedure.    Evaluation and management service: I agree with the evaluation and management decisions made in this patient's care.    Results interpretation: I agree with the study interpretation in this patient's care.         MD Wesley Poole MD  02/21/21 1058

## 2021-03-07 ENCOUNTER — IMMUNIZATION (OUTPATIENT)
Dept: LAB | Age: 66
End: 2021-03-07
Attending: HOSPITALIST
Payer: COMMERCIAL

## 2021-03-07 DIAGNOSIS — Z23 NEED FOR VACCINATION: Primary | ICD-10-CM

## 2021-03-07 PROCEDURE — 0001A SARSCOV2 VAC 30MCG/0.3ML IM: CPT

## 2021-03-22 ENCOUNTER — APPOINTMENT (OUTPATIENT)
Dept: GENERAL RADIOLOGY | Facility: HOSPITAL | Age: 66
End: 2021-03-22
Attending: EMERGENCY MEDICINE
Payer: COMMERCIAL

## 2021-03-22 ENCOUNTER — HOSPITAL ENCOUNTER (EMERGENCY)
Facility: HOSPITAL | Age: 66
Discharge: HOME | End: 2021-03-22
Attending: EMERGENCY MEDICINE
Payer: COMMERCIAL

## 2021-03-22 ENCOUNTER — NURSE TRIAGE (OUTPATIENT)
Dept: INTERNAL MEDICINE CLINIC | Facility: CLINIC | Age: 66
End: 2021-03-22

## 2021-03-22 VITALS
RESPIRATION RATE: 26 BRPM | WEIGHT: 200 LBS | TEMPERATURE: 98 F | SYSTOLIC BLOOD PRESSURE: 136 MMHG | DIASTOLIC BLOOD PRESSURE: 105 MMHG | HEART RATE: 103 BPM | OXYGEN SATURATION: 98 % | BODY MASS INDEX: 24.36 KG/M2 | HEIGHT: 76 IN

## 2021-03-22 DIAGNOSIS — S90.31XA CONTUSION OF RIGHT FOOT, INITIAL ENCOUNTER: ICD-10-CM

## 2021-03-22 DIAGNOSIS — I48.91 ATRIAL FIBRILLATION WITH RAPID VENTRICULAR RESPONSE (HCC): Primary | ICD-10-CM

## 2021-03-22 LAB
ANION GAP SERPL CALC-SCNC: 5 MMOL/L (ref 0–18)
BASOPHILS # BLD AUTO: 0.04 X10(3) UL (ref 0–0.2)
BASOPHILS NFR BLD AUTO: 0.5 %
BUN BLD-MCNC: 14 MG/DL (ref 7–18)
BUN/CREAT SERPL: 13.2 (ref 10–20)
CALCIUM BLD-MCNC: 9.1 MG/DL (ref 8.5–10.1)
CHLORIDE SERPL-SCNC: 109 MMOL/L (ref 98–112)
CO2 SERPL-SCNC: 25 MMOL/L (ref 21–32)
CREAT BLD-MCNC: 1.06 MG/DL
DEPRECATED RDW RBC AUTO: 47.9 FL (ref 35.1–46.3)
DIGOXIN SERPL-MCNC: 0.14 NG/ML (ref 0.8–2)
EOSINOPHIL # BLD AUTO: 0.02 X10(3) UL (ref 0–0.7)
EOSINOPHIL NFR BLD AUTO: 0.2 %
ERYTHROCYTE [DISTWIDTH] IN BLOOD BY AUTOMATED COUNT: 15.2 % (ref 11–15)
GLUCOSE BLD-MCNC: 100 MG/DL (ref 70–99)
HAV IGM SER QL: 2.3 MG/DL (ref 1.6–2.6)
HCT VFR BLD AUTO: 46.8 %
HGB BLD-MCNC: 15.1 G/DL
IMM GRANULOCYTES # BLD AUTO: 0.02 X10(3) UL (ref 0–1)
IMM GRANULOCYTES NFR BLD: 0.2 %
INR BLD: 1.33 (ref 0.9–1.2)
LYMPHOCYTES # BLD AUTO: 1.98 X10(3) UL (ref 1–4)
LYMPHOCYTES NFR BLD AUTO: 23.1 %
MCH RBC QN AUTO: 27.6 PG (ref 26–34)
MCHC RBC AUTO-ENTMCNC: 32.3 G/DL (ref 31–37)
MCV RBC AUTO: 85.6 FL
MONOCYTES # BLD AUTO: 1.1 X10(3) UL (ref 0.1–1)
MONOCYTES NFR BLD AUTO: 12.8 %
NEUTROPHILS # BLD AUTO: 5.43 X10 (3) UL (ref 1.5–7.7)
NEUTROPHILS # BLD AUTO: 5.43 X10(3) UL (ref 1.5–7.7)
NEUTROPHILS NFR BLD AUTO: 63.2 %
OSMOLALITY SERPL CALC.SUM OF ELEC: 289 MOSM/KG (ref 275–295)
PLATELET # BLD AUTO: 224 10(3)UL (ref 150–450)
POTASSIUM SERPL-SCNC: 3.6 MMOL/L (ref 3.5–5.1)
PROTHROMBIN TIME: 16.2 SECONDS (ref 11.8–14.5)
RBC # BLD AUTO: 5.47 X10(6)UL
SODIUM SERPL-SCNC: 139 MMOL/L (ref 136–145)
WBC # BLD AUTO: 8.6 X10(3) UL (ref 4–11)

## 2021-03-22 PROCEDURE — 96374 THER/PROPH/DIAG INJ IV PUSH: CPT

## 2021-03-22 PROCEDURE — 73630 X-RAY EXAM OF FOOT: CPT | Performed by: EMERGENCY MEDICINE

## 2021-03-22 PROCEDURE — 80162 ASSAY OF DIGOXIN TOTAL: CPT | Performed by: EMERGENCY MEDICINE

## 2021-03-22 PROCEDURE — 80048 BASIC METABOLIC PNL TOTAL CA: CPT | Performed by: EMERGENCY MEDICINE

## 2021-03-22 PROCEDURE — 85025 COMPLETE CBC W/AUTO DIFF WBC: CPT | Performed by: EMERGENCY MEDICINE

## 2021-03-22 PROCEDURE — 83735 ASSAY OF MAGNESIUM: CPT | Performed by: EMERGENCY MEDICINE

## 2021-03-22 PROCEDURE — 99284 EMERGENCY DEPT VISIT MOD MDM: CPT

## 2021-03-22 PROCEDURE — 85610 PROTHROMBIN TIME: CPT | Performed by: EMERGENCY MEDICINE

## 2021-03-22 PROCEDURE — 93005 ELECTROCARDIOGRAM TRACING: CPT

## 2021-03-22 PROCEDURE — 93010 ELECTROCARDIOGRAM REPORT: CPT | Performed by: EMERGENCY MEDICINE

## 2021-03-22 RX ORDER — DIGOXIN 250 MCG
125 TABLET ORAL DAILY
Status: DISCONTINUED | OUTPATIENT
Start: 2021-03-22 | End: 2021-03-22

## 2021-03-22 RX ORDER — CARVEDILOL 3.12 MG/1
3.12 TABLET ORAL ONCE
Status: COMPLETED | OUTPATIENT
Start: 2021-03-22 | End: 2021-03-22

## 2021-03-22 NOTE — TELEPHONE ENCOUNTER
Action Requested: Summary for Provider     []  Critical Lab, Recommendations Needed  [] Need Additional Advice  [x]   FYI    []   Need Orders  [] Need Medications Sent to Pharmacy  []  Other     SUMMARY: Pt states last night his right leg gave out and he f

## 2021-03-22 NOTE — TELEPHONE ENCOUNTER
Agree the patient is to go to the emergency room for x-rays to make sure he does not have a fracture

## 2021-03-22 NOTE — ED INITIAL ASSESSMENT (HPI)
Patient was walking at home when is right leg gave out and he fell yesterday. Complains of right foot pain. No LOC  Onto wood floor. Patient did not hit head. Denies pain while sitting.

## 2021-03-22 NOTE — ED NOTES
Patient cleared for discharge by MD. Muellers with patient. Patient discharge instructions reviewed with patient including when and how to follow up  with healthcare provider and when to seek medical treatment. Peripheral IV removed.

## 2021-03-23 NOTE — ED PROVIDER NOTES
Patient Seen in: [facility] AND [facility] Emergency Department      History   Patient presents with:  Fall    Stated Complaint: fall    HPI/Subjective:   HPI    78year old male with h/o a fib on coumadin, chf, pre-dm, obesity who  Presents for R foot injury.  P Years: 20.00        Quit date: 2000        Years since quittin.1      Smokeless tobacco: Never Used      Tobacco comment: Was smoking 4-5 cigarettes per day    Vaping Use      Vaping Use: Never used    Alcohol use:  Yes      Alcohol/week: 0.0 passive range of motion without pain, normal range of motion and neck supple. No rigidity.       Right hip: Normal.      Left hip: Normal.      Right knee: Normal.      Left knee: Normal.      Right ankle: Normal.      Right Achilles Tendon: Normal.      Jesus Alberto Hess ------                     CBC W/ DIFFERENTIAL[[id number]]          Abnormal            Final result                 Please view results for these tests on the individual orders.    RAINBOW DRAW BLUE   RAINBOW DRAW LAVENDER   RAINBOW DRAW LIGHT GREEN   RAINB [phone]    Schedule an appointment as soon as possible for a visit in 2 days  Call for next available appointment    We recommend that you schedule follow up care with a primary care provider within the next three months to obtain basic health screen

## 2021-03-23 NOTE — TELEPHONE ENCOUNTER
ED  Discharged   3/22/2021 (3 hours)  [facility] Emergency Department     Mara Maria MD  Last attending • Treatment team  Atrial fibrillation with rapid ventricular response [facility]) +1 more  Clinical impression  Fall  Chief complaint

## 2021-03-28 ENCOUNTER — IMMUNIZATION (OUTPATIENT)
Dept: LAB | Age: 66
End: 2021-03-28
Attending: HOSPITALIST
Payer: COMMERCIAL

## 2021-03-28 DIAGNOSIS — Z23 NEED FOR VACCINATION: Primary | ICD-10-CM

## 2021-03-28 PROCEDURE — 0002A SARSCOV2 VAC 30MCG/0.3ML IM: CPT

## 2021-03-30 ENCOUNTER — OFFICE VISIT (OUTPATIENT)
Dept: INTERNAL MEDICINE CLINIC | Facility: CLINIC | Age: 66
End: 2021-03-30
Payer: COMMERCIAL

## 2021-03-30 VITALS
BODY MASS INDEX: 29.22 KG/M2 | HEIGHT: 76 IN | WEIGHT: 240 LBS | DIASTOLIC BLOOD PRESSURE: 74 MMHG | SYSTOLIC BLOOD PRESSURE: 124 MMHG

## 2021-03-30 DIAGNOSIS — R53.1 WEAKNESS: Primary | ICD-10-CM

## 2021-03-30 DIAGNOSIS — F32.89 OTHER DEPRESSION: ICD-10-CM

## 2021-03-30 DIAGNOSIS — I50.23 ACUTE ON CHRONIC SYSTOLIC HEART FAILURE (HCC): ICD-10-CM

## 2021-03-30 PROCEDURE — 99214 OFFICE O/P EST MOD 30 MIN: CPT | Performed by: INTERNAL MEDICINE

## 2021-03-30 PROCEDURE — 3074F SYST BP LT 130 MM HG: CPT | Performed by: INTERNAL MEDICINE

## 2021-03-30 PROCEDURE — 3008F BODY MASS INDEX DOCD: CPT | Performed by: INTERNAL MEDICINE

## 2021-03-30 PROCEDURE — 3078F DIAST BP <80 MM HG: CPT | Performed by: INTERNAL MEDICINE

## 2021-03-30 RX ORDER — WARFARIN SODIUM 7.5 MG/1
7.5 TABLET ORAL NIGHTLY
Qty: 20 TABLET | Refills: 0 | Status: SHIPPED | OUTPATIENT
Start: 2021-03-30 | End: 2021-04-20

## 2021-03-30 NOTE — PROGRESS NOTES
Niki Hanson is a 78year old male. HPI:   1. Weakness     Had been in hospital with CHF and swelling and meds adjusted. Needed in home therapy and had not had this fdollow up done.  Was in [facility] and is still weak and unable to get around and do the Past Medical History:   Diagnosis Date   • Alcohol abuse 2003   • Atrial fibrillation [facility])    • B12 deficiency    • Clot 2009    Right leg clot, coumadin   • Congestive heart failure ([facility] [address] 2010    medication   • Hyperthyroidism     Amiodarone induce emergency room after going there with difficulty walking. The patient has been very active and his muscle tone is decreased and strength is diminished.   He is encouraged to do more for himself at home walk stand and exercise as able to get his strength ba

## 2021-04-12 NOTE — LETTER
10/19/2019      Nae Rea  [phone] [id number]      RE: Appointment Needed for Continuous Medication Refill     Dear Nae Rea:        Your health care is very important to us.    Part of this process is monitoring your medica
Please follow up with Dr. Vuong as scheduled on 4/27/21.  Please call to confirm your appointment time.  Please call to change appointment if you are still in rehab.    Please follow up with your primary care provider in 4 weeks from hospital discharge for continuum of care and medication adjustment as needed

## 2021-04-19 ENCOUNTER — TELEPHONE (OUTPATIENT)
Dept: INTERNAL MEDICINE CLINIC | Facility: CLINIC | Age: 66
End: 2021-04-19

## 2021-04-19 NOTE — TELEPHONE ENCOUNTER
Pt spouse calling to report pt had a \"mild stroke\" last week and was taken to [facility].      Pt spouse has questions about a change in pt blood thinner. Advised HFU and pt spouse agrees to plan. OV scheduled 4/20/21 with Dr Blake Chou.

## 2021-04-20 ENCOUNTER — OFFICE VISIT (OUTPATIENT)
Dept: INTERNAL MEDICINE CLINIC | Facility: CLINIC | Age: 66
End: 2021-04-20
Payer: COMMERCIAL

## 2021-04-20 VITALS — HEIGHT: 75 IN | RESPIRATION RATE: 16 BRPM | BODY MASS INDEX: 28.6 KG/M2 | WEIGHT: 230 LBS

## 2021-04-20 DIAGNOSIS — I50.23 ACUTE ON CHRONIC SYSTOLIC HEART FAILURE (HCC): ICD-10-CM

## 2021-04-20 DIAGNOSIS — G45.9 TRANSIENT ISCHEMIC ATTACK (TIA): Primary | ICD-10-CM

## 2021-04-20 DIAGNOSIS — F32.89 OTHER DEPRESSION: ICD-10-CM

## 2021-04-20 PROCEDURE — 3008F BODY MASS INDEX DOCD: CPT | Performed by: INTERNAL MEDICINE

## 2021-04-20 PROCEDURE — 1111F DSCHRG MED/CURRENT MED MERGE: CPT | Performed by: INTERNAL MEDICINE

## 2021-04-20 PROCEDURE — 99214 OFFICE O/P EST MOD 30 MIN: CPT | Performed by: INTERNAL MEDICINE

## 2021-04-20 RX ORDER — FUROSEMIDE 20 MG/1
20 TABLET ORAL 2 TIMES DAILY
Qty: 90 TABLET | Refills: 1 | Status: SHIPPED | OUTPATIENT
Start: 2021-04-20 | End: 2021-06-21

## 2021-04-20 RX ORDER — APIXABAN 5 MG/1
5 TABLET, FILM COATED ORAL 2 TIMES DAILY
COMMUNITY
Start: 2021-04-17 | End: 2021-04-21

## 2021-04-20 RX ORDER — SACUBITRIL AND VALSARTAN 24; 26 MG/1; MG/1
1 TABLET, FILM COATED ORAL 2 TIMES DAILY
Qty: 60 TABLET | Refills: 3 | COMMUNITY
Start: 2021-04-20 | End: 2021-05-25

## 2021-04-20 RX ORDER — ATORVASTATIN CALCIUM 10 MG/1
10 TABLET, FILM COATED ORAL NIGHTLY
Qty: 90 TABLET | Refills: 3 | Status: SHIPPED | OUTPATIENT
Start: 2021-04-20
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
## 2021-05-05 RX ORDER — ESCITALOPRAM OXALATE 10 MG/1
TABLET ORAL
Qty: 30 TABLET | Refills: 0 | Status: SHIPPED | OUTPATIENT
Start: 2021-05-05 | End: 2021-06-03

## 2021-05-18 ENCOUNTER — HOSPITAL ENCOUNTER (OUTPATIENT)
Dept: ULTRASOUND IMAGING | Age: 66
Discharge: HOME | End: 2021-05-18
Attending: INTERNAL MEDICINE
Payer: COMMERCIAL

## 2021-05-18 DIAGNOSIS — G45.9 TRANSIENT ISCHEMIC ATTACK (TIA): ICD-10-CM

## 2021-05-18 PROCEDURE — 93880 EXTRACRANIAL BILAT STUDY: CPT | Performed by: INTERNAL MEDICINE

## 2021-05-20 ENCOUNTER — TELEPHONE (OUTPATIENT)
Dept: INTERNAL MEDICINE CLINIC | Facility: CLINIC | Age: 66
End: 2021-05-20

## 2021-05-25 ENCOUNTER — OFFICE VISIT (OUTPATIENT)
Dept: INTERNAL MEDICINE CLINIC | Facility: CLINIC | Age: 66
End: 2021-05-25
Payer: COMMERCIAL

## 2021-05-25 VITALS — BODY MASS INDEX: 28.1 KG/M2 | HEIGHT: 75 IN | RESPIRATION RATE: 16 BRPM | WEIGHT: 226 LBS

## 2021-05-25 DIAGNOSIS — I50.23 ACUTE ON CHRONIC SYSTOLIC HEART FAILURE (HCC): Primary | ICD-10-CM

## 2021-05-25 PROCEDURE — 3008F BODY MASS INDEX DOCD: CPT | Performed by: INTERNAL MEDICINE

## 2021-05-25 PROCEDURE — 99214 OFFICE O/P EST MOD 30 MIN: CPT | Performed by: INTERNAL MEDICINE

## 2021-05-25 RX ORDER — SACUBITRIL AND VALSARTAN 24; 26 MG/1; MG/1
1 TABLET, FILM COATED ORAL 2 TIMES DAILY
Qty: 180 TABLET | Refills: 1 | Status: SHIPPED | OUTPATIENT
Start: 2021-05-25 | End: 2022-01-14

## 2021-05-25 NOTE — PROGRESS NOTES
Chante Kasper is a 78year old male. HPI:     1. Congestive Heart Failure    Has been treated for CHF and was in [facility] for 3 weeks but has not been doing anything for himself. Wife said he is very inactive and doesn't do \"anything\" for himself.  Ruperto Kawasaki Atrial [facility] ([facility]    • B12 deficiency    • Clot 2009    Right leg clot, coumadin   • Congest[facility] heart failure [facility] 2010    medication   • Hyperthyroidism     Amiodarone induced Hyperthyroidism, D/C amiodarone   • Lipoma     Excision lipoma of back bed several inches if short of breath at night. Take meds as directed and measure your weight daily. Call if weight goes up or down by more than 3-5 pounds for medication adjustment. Follow up with cardiology as discussed. Continue to walk regularly and judah

## 2021-05-27 ENCOUNTER — TELEPHONE (OUTPATIENT)
Dept: INTERNAL MEDICINE CLINIC | Facility: CLINIC | Age: 66
End: 2021-05-27

## 2021-05-27 NOTE — TELEPHONE ENCOUNTER
Patient no longer on warfarin, Dr. Eden Pathak started Daryn Morin on Eliquis 4/21/21. Episode of care resolved, lab orders discontinued.

## 2021-06-03 RX ORDER — ESCITALOPRAM OXALATE 10 MG/1
TABLET ORAL
Qty: 30 TABLET | Refills: 0 | Status: SHIPPED | OUTPATIENT
Start: 2021-06-03 | End: 2021-06-29

## 2021-06-21 RX ORDER — FUROSEMIDE 20 MG/1
TABLET ORAL
Qty: 180 TABLET | Refills: 3 | Status: SHIPPED | OUTPATIENT
Start: 2021-06-21 | End: 2021-09-17 | Stop reason: ALTCHOICE

## 2021-06-29 RX ORDER — ESCITALOPRAM OXALATE 10 MG/1
TABLET ORAL
Qty: 30 TABLET | Refills: 0 | Status: SHIPPED | OUTPATIENT
Start: 2021-06-29 | End: 2021-09-17

## 2021-09-15 ENCOUNTER — TELEPHONE (OUTPATIENT)
Dept: INTERNAL MEDICINE CLINIC | Facility: CLINIC | Age: 66
End: 2021-09-15

## 2021-09-15 NOTE — TELEPHONE ENCOUNTER
Patient called asking for which providers he can see now that Dr. Cory Shaffer has retired  Gave him list of names and her prefers Dr. Rigoberto Mathis  When transferred phone call to [facility], phone call cut out  Called patient back twice but each time received voi

## 2021-09-17 ENCOUNTER — OFFICE VISIT (OUTPATIENT)
Dept: INTERNAL MEDICINE CLINIC | Facility: CLINIC | Age: 66
End: 2021-09-17
Payer: COMMERCIAL

## 2021-09-17 VITALS
HEIGHT: 75 IN | SYSTOLIC BLOOD PRESSURE: 97 MMHG | BODY MASS INDEX: 30.34 KG/M2 | HEART RATE: 44 BPM | WEIGHT: 244 LBS | DIASTOLIC BLOOD PRESSURE: 72 MMHG

## 2021-09-17 DIAGNOSIS — F32.89 OTHER DEPRESSION: ICD-10-CM

## 2021-09-17 DIAGNOSIS — I10 PRIMARY HYPERTENSION: Primary | ICD-10-CM

## 2021-09-17 DIAGNOSIS — I48.20 CHRONIC ATRIAL FIBRILLATION (HCC): ICD-10-CM

## 2021-09-17 DIAGNOSIS — E78.2 MIXED HYPERLIPIDEMIA: ICD-10-CM

## 2021-09-17 DIAGNOSIS — G47.33 OSA (OBSTRUCTIVE SLEEP APNEA): ICD-10-CM

## 2021-09-17 DIAGNOSIS — I42.0 DILATED CARDIOMYOPATHY (HCC): ICD-10-CM

## 2021-09-17 DIAGNOSIS — G89.4 CHRONIC PAIN SYNDROME: ICD-10-CM

## 2021-09-17 PROBLEM — R26.2 UNABLE TO WALK: Status: RESOLVED | Noted: 2020-09-01 | Resolved: 2021-09-17

## 2021-09-17 PROBLEM — Z12.5 SCREENING FOR PROSTATE CANCER: Status: RESOLVED | Noted: 2018-08-20 | Resolved: 2021-09-17

## 2021-09-17 PROBLEM — Z51.81 ENCOUNTER FOR THERAPEUTIC DRUG MONITORING: Status: RESOLVED | Noted: 2019-09-04 | Resolved: 2021-09-17

## 2021-09-17 PROBLEM — I50.9 ACUTE ON CHRONIC CONGESTIVE HEART FAILURE, UNSPECIFIED HEART FAILURE TYPE (HCC): Status: RESOLVED | Noted: 2020-08-03 | Resolved: 2021-09-17

## 2021-09-17 PROBLEM — R53.1 WEAKNESS: Status: RESOLVED | Noted: 2021-01-11 | Resolved: 2021-09-17

## 2021-09-17 PROBLEM — R13.10 DYSPHAGIA: Status: RESOLVED | Noted: 2018-03-05 | Resolved: 2021-09-17

## 2021-09-17 PROBLEM — G45.9 TRANSIENT ISCHEMIC ATTACK (TIA): Status: RESOLVED | Noted: 2021-04-20 | Resolved: 2021-09-17

## 2021-09-17 PROBLEM — R26.2 INABILITY TO AMBULATE DUE TO KNEE: Status: RESOLVED | Noted: 2020-07-02 | Resolved: 2021-09-17

## 2021-09-17 PROBLEM — Z09 HOSPITAL DISCHARGE FOLLOW-UP: Status: RESOLVED | Noted: 2020-08-14 | Resolved: 2021-09-17

## 2021-09-17 PROBLEM — Z79.01 LONG TERM (CURRENT) USE OF ANTICOAGULANTS: Status: RESOLVED | Noted: 2019-09-04 | Resolved: 2021-09-17

## 2021-09-17 PROBLEM — I50.23 ACUTE ON CHRONIC SYSTOLIC HEART FAILURE (HCC): Status: RESOLVED | Noted: 2020-08-13 | Resolved: 2021-09-17

## 2021-09-17 PROBLEM — M25.50 POLYARTHRALGIA: Status: RESOLVED | Noted: 2020-09-01 | Resolved: 2021-09-17

## 2021-09-17 PROBLEM — L57.8 SUN-DAMAGED SKIN: Status: RESOLVED | Noted: 2017-02-14 | Resolved: 2021-09-17

## 2021-09-17 PROBLEM — I48.91 ATRIAL FIBRILLATION WITH RAPID VENTRICULAR RESPONSE (HCC): Status: RESOLVED | Noted: 2020-08-03 | Resolved: 2021-09-17

## 2021-09-17 PROBLEM — R63.5 WEIGHT GAIN: Status: RESOLVED | Noted: 2018-08-20 | Resolved: 2021-09-17

## 2021-09-17 PROBLEM — R63.2 INCREASED APPETITE: Status: RESOLVED | Noted: 2018-08-20 | Resolved: 2021-09-17

## 2021-09-17 PROCEDURE — 3078F DIAST BP <80 MM HG: CPT | Performed by: INTERNAL MEDICINE

## 2021-09-17 PROCEDURE — 3074F SYST BP LT 130 MM HG: CPT | Performed by: INTERNAL MEDICINE

## 2021-09-17 PROCEDURE — 3008F BODY MASS INDEX DOCD: CPT | Performed by: INTERNAL MEDICINE

## 2021-09-17 PROCEDURE — 99214 OFFICE O/P EST MOD 30 MIN: CPT | Performed by: INTERNAL MEDICINE

## 2021-09-17 RX ORDER — ESCITALOPRAM OXALATE 10 MG/1
10 TABLET ORAL DAILY
Qty: 90 TABLET | Refills: 0 | Status: SHIPPED | OUTPATIENT
Start: 2021-09-17 | End: 2022-01-18

## 2021-09-17 RX ORDER — ACETAMINOPHEN AND CODEINE PHOSPHATE 300; 30 MG/1; MG/1
1 TABLET ORAL EVERY 6 HOURS PRN
Qty: 40 TABLET | Refills: 1 | Status: SHIPPED | OUTPATIENT
Start: 2021-09-17 | End: 2022-02-01 | Stop reason: ALTCHOICE

## 2021-09-17 NOTE — PROGRESS NOTES
Patient ID: Magi Triana is a 78year old male. Patient presents with:  Establish Care: previous pt of PVR       HISTORY OF PRESENT ILLNESS:   HPI  Patient presents for above. Here to establish care after previous PCP retired.     History of hypertensi Acute on chronic systolic heart failure ([facility] 8/13/2020   • Alcohol abuse 2003   • Atrial fibrillation [facility])    • Atrial fibrillation ([facility] 5/10/2016   • Atrial fibrillation with rapid ventricular response ([facility] 8/3/2020   • B12 deficiency    • Clot 2009 by mouth 2 (two) times daily. , Disp: 180 tablet, Rfl: 1  •  ELIQUIS 5 MG Oral Tab, Take 1 tablet (5 mg total) by mouth 2 (two) times a day., Disp: 180 tablet, Rfl: 3  •  atorvastatin 10 MG Oral Tab, Take 1 tablet (10 mg total) by mouth nightly., Disp: 90 t Expenses: Not on file  Food Insecurity:       Worried About [address] in the Last Year: Not on file      Ran Out of Food in the Last Year: Not on file  Transportation Needs:       Lack of Transportation (Medical):  Not on file      Lack of Transpo Abdominal:      General: Abdomen is flat. Bowel sounds are normal. There is no distension. Palpations: Abdomen is soft. Skin:     General: Skin is warm. Neurological:      General: No focal deficit present. Mental Status: He is alert.

## 2021-09-20 ENCOUNTER — TELEPHONE (OUTPATIENT)
Dept: INTERNAL MEDICINE CLINIC | Facility: CLINIC | Age: 66
End: 2021-09-20

## 2021-09-20 NOTE — TELEPHONE ENCOUNTER
Spoke to patient. He lost the paperwork from his appointment with Dr. Swetha Fernandez on 9/17. Patient was asking for referral name/phone number. Relayed information to patient.

## 2021-10-18 ENCOUNTER — OFFICE VISIT (OUTPATIENT)
Dept: PAIN CLINIC | Facility: HOSPITAL | Age: 66
End: 2021-10-18
Attending: ANESTHESIOLOGY
Payer: COMMERCIAL

## 2021-10-18 ENCOUNTER — TELEPHONE (OUTPATIENT)
Dept: INTERNAL MEDICINE CLINIC | Facility: CLINIC | Age: 66
End: 2021-10-18

## 2021-10-18 ENCOUNTER — TELEPHONE (OUTPATIENT)
Dept: NEUROLOGY | Facility: CLINIC | Age: 66
End: 2021-10-18

## 2021-10-18 VITALS — SYSTOLIC BLOOD PRESSURE: 101 MMHG | HEART RATE: 59 BPM | DIASTOLIC BLOOD PRESSURE: 71 MMHG

## 2021-10-18 DIAGNOSIS — G89.29 CHRONIC LOW BACK PAIN WITH SCIATICA, SCIATICA LATERALITY UNSPECIFIED, UNSPECIFIED BACK PAIN LATERALITY: ICD-10-CM

## 2021-10-18 DIAGNOSIS — M48.07 NEUROFORAMINAL STENOSIS OF LUMBOSACRAL SPINE: ICD-10-CM

## 2021-10-18 DIAGNOSIS — M54.16 BILATERAL LUMBAR RADICULOPATHY: Primary | ICD-10-CM

## 2021-10-18 DIAGNOSIS — M54.40 CHRONIC LOW BACK PAIN WITH SCIATICA, SCIATICA LATERALITY UNSPECIFIED, UNSPECIFIED BACK PAIN LATERALITY: ICD-10-CM

## 2021-10-18 PROCEDURE — 99202 OFFICE O/P NEW SF 15 MIN: CPT

## 2021-10-18 NOTE — PROGRESS NOTES
PT presents ambulatory to the CPM.  Pt reports 7-8/10  Lower back and BLE pain. Dr. Yahaira Sandoval saw PT to establish care. See notes for POC.

## 2021-10-18 NOTE — TELEPHONE ENCOUNTER
Bilateral L5/S1 TFESI CPT CODE: 61837E8 Dx M54.1, M51.36- APPROVED   Called [facility], spoke to Karishma LITTLEJOHN who states, authorization is not required   Reference call #  [phone]     Notified OLESYA Pierre for scheduling

## 2021-10-18 NOTE — CHRONIC PAIN
[facility]  Pain Consultation     HISTORY OF PRESENT ILLNESS:  Oanh Rule is a 78year old old male referred to the pain clinic by Dr. Gonzalo Villarreal for Bilateral lumbar radiculopathy  (primary encounter diagnosis)  Chronic low back pain with s cyanocobalamin  1,000 mcg Intramuscular Q30 Days     Continuous Infusions:  PRN Meds:.         ALLERGIES:  No Known Allergies    SURGICAL HISTORY:  Past Surgical History:   Procedure Laterality Date   • ELECTROCARDIOGRAM, COMPLETE  2/27/2012    scanned to m Depression     Chronic pain syndrome    Past Medical History:   Diagnosis Date   • Acute on chronic congestive heart failure, unspecified heart failure type [facility] 8/3/2020   • Acute on chronic systolic heart failure ([facility] 8/13/2020   • Alcohol abuse 2003 Sexual activity: Not on file    Other Topics      Concerns:         Service: Not Asked        Blood Transfusions: Not Asked        Caffeine Concern: Yes          tea, coffee, 8 cups daily        Occupational Exposure: Not Asked        Stephanie Rico Last Year: Not on file      Number of Places Lived in the Last Year: Not on file      Unstable Housing in the Last Year: Not on file    ADVANCE CARE PLANNING:  Advance Care Plan NOT discussed.     PHYSICAL EXAMINATION:   10/18/21  1145   BP: 101/71   BP Loc substantially compromised by patient motion artifact. NUMERATION: For the purposes of this examination, the lowest fully formed disc space is designated as L5-S1.   ALIGNMENT: There is preservation of the expected lumbar lordosis.  Mild dextroscoliosis. 03/22/2021    MCV 85.6 03/22/2021    MCH 27.6 03/22/2021    MCHC 32.3 03/22/2021    RDW 15.2 (H) 03/22/2021    .0 03/22/2021    MPV 10.0 10/31/2018     Lab Results   Component Value Date     05/26/2021    K 4.27 05/26/2021     05/26/2021 34 minutes    Penny Stanley MD  10/18/2021  Anesthesia Chronic Pain Service

## 2021-10-25 ENCOUNTER — TELEPHONE (OUTPATIENT)
Dept: PAIN CLINIC | Facility: HOSPITAL | Age: 66
End: 2021-10-25

## 2021-10-25 NOTE — TELEPHONE ENCOUNTER
Returned patients call. Patient is asking about \"exercises\" for his pain. He states that \"my pain is not that bad and im deathly afraid of needles. \" Offered MAC for his procedure but he declines. Recommended PT. Patient states that he doesn't drive.  Doc Hawk

## 2022-01-18 ENCOUNTER — TELEPHONE (OUTPATIENT)
Dept: INTERNAL MEDICINE CLINIC | Facility: CLINIC | Age: 67
End: 2022-01-18

## 2022-01-18 DIAGNOSIS — F32.89 OTHER DEPRESSION: ICD-10-CM

## 2022-01-18 RX ORDER — ESCITALOPRAM OXALATE 10 MG/1
TABLET ORAL
Qty: 90 TABLET | Refills: 0 | OUTPATIENT
Start: 2022-01-18

## 2022-01-18 RX ORDER — ESCITALOPRAM OXALATE 10 MG/1
10 TABLET ORAL DAILY
Qty: 90 TABLET | Refills: 1 | Status: SHIPPED | OUTPATIENT
Start: 2022-01-18

## 2022-01-18 NOTE — TELEPHONE ENCOUNTER
Refilled per protocol.   Refill Protocol Appointment Criteria  · Appointment scheduled in the past 12 months or in the next 3 months  Recent Outpatient Visits            3 months ago Bilateral lumbar radiculopathy    [facility]

## 2022-02-01 ENCOUNTER — MED REC SCAN ONLY (OUTPATIENT)
Dept: INTERNAL MEDICINE CLINIC | Facility: CLINIC | Age: 67
End: 2022-02-01

## 2022-02-01 ENCOUNTER — OFFICE VISIT (OUTPATIENT)
Dept: INTERNAL MEDICINE CLINIC | Facility: CLINIC | Age: 67
End: 2022-02-01
Payer: COMMERCIAL

## 2022-02-01 VITALS
SYSTOLIC BLOOD PRESSURE: 104 MMHG | DIASTOLIC BLOOD PRESSURE: 71 MMHG | BODY MASS INDEX: 32.08 KG/M2 | HEART RATE: 70 BPM | WEIGHT: 258 LBS | HEIGHT: 75 IN

## 2022-02-01 DIAGNOSIS — Z02.89 ENCOUNTER FOR COMPLETION OF FORM WITH PATIENT: ICD-10-CM

## 2022-02-01 DIAGNOSIS — G89.29 CHRONIC PAIN OF BOTH KNEES: ICD-10-CM

## 2022-02-01 DIAGNOSIS — M25.562 CHRONIC PAIN OF BOTH KNEES: ICD-10-CM

## 2022-02-01 DIAGNOSIS — G89.4 CHRONIC PAIN SYNDROME: Primary | ICD-10-CM

## 2022-02-01 DIAGNOSIS — M25.561 CHRONIC PAIN OF BOTH KNEES: ICD-10-CM

## 2022-02-01 PROCEDURE — 99214 OFFICE O/P EST MOD 30 MIN: CPT | Performed by: INTERNAL MEDICINE

## 2022-02-01 PROCEDURE — 3074F SYST BP LT 130 MM HG: CPT | Performed by: INTERNAL MEDICINE

## 2022-02-01 PROCEDURE — 3078F DIAST BP <80 MM HG: CPT | Performed by: INTERNAL MEDICINE

## 2022-02-01 PROCEDURE — 3008F BODY MASS INDEX DOCD: CPT | Performed by: INTERNAL MEDICINE

## 2022-03-15 RX ORDER — ATORVASTATIN CALCIUM 10 MG/1
10 TABLET, FILM COATED ORAL NIGHTLY
Qty: 90 TABLET | Refills: 1 | OUTPATIENT
Start: 2022-03-15

## 2022-03-15 RX ORDER — ATORVASTATIN CALCIUM 10 MG/1
10 TABLET, FILM COATED ORAL NIGHTLY
Qty: 90 TABLET | Refills: 3 | Status: SHIPPED | OUTPATIENT
Start: 2022-03-15

## 2022-03-15 NOTE — TELEPHONE ENCOUNTER
Please review. Protocol Failed or has no Protocol  Requested Prescriptions   Pending Prescriptions Disp Refills    atorvastatin 10 MG Oral Tab 90 tablet 1     Sig: Take 1 tablet (10 mg total) by mouth nightly.         Cholesterol Medication Protocol Failed - 3/15/2022  1:46 PM        Failed - ALT in past 12 months        Failed - LDL in past 12 months        Failed - Last ALT < 80       Lab Results   Component Value Date    ALT 23 11/25/2019             Failed - Last LDL < 130     Lab Results   Component Value Date    LDL 99 11/25/2019               Passed - Appointment in past 12 or next 3 months            Future Appointments         Provider Department Appt Notes    In 1 month Michelle Jimenez MD [facility], [facility], [address] [place] [address]          Recent Outpatient Visits              1 month ago Chronic pain syndrome    Adelina Paul Christal Gilding, MD    Office Visit    4 months ago Bilateral lumbar radiculopathy    [facility] Ed Joseph MD    Office Visit    5 months ago Primary hypertension    Jessie Talbot Bantamjay Jimenez MD    Office Visit    6 months ago Dilated cardiomyopathy [facility])    Cardiology - Chery Veliz MD    Office Visit    7 months ago Chronic atrial fibrillation [facility])    Cardiology - Haile Church MD    Office Visit

## 2022-06-15 DIAGNOSIS — F32.89 OTHER DEPRESSION: ICD-10-CM

## 2022-06-15 RX ORDER — ESCITALOPRAM OXALATE 10 MG/1
10 TABLET ORAL DAILY
Qty: 90 TABLET | Refills: 1 | Status: SHIPPED | OUTPATIENT
Start: 2022-06-15

## 2022-06-15 NOTE — TELEPHONE ENCOUNTER
Refill passed per [facility] protocol   Requested Prescriptions   Pending Prescriptions Disp Refills    ESCITALOPRAM 10 MG Oral Tab [Pharmacy Med Name: Escitalopram Oxalate 10 MG Oral Tablet] 90 tablet 3     Sig: TAKE 1 TABLET BY MOUTH  DAILY        Psychiatric Non-Scheduled (Anti-Anxiety) Passed - 6/15/2022  5:37 AM        Passed - Appointment in last 6 or next 3 months

## 2022-07-19 ENCOUNTER — OFFICE VISIT (OUTPATIENT)
Dept: INTERNAL MEDICINE CLINIC | Facility: CLINIC | Age: 67
End: 2022-07-19
Payer: COMMERCIAL

## 2022-07-19 ENCOUNTER — LAB ENCOUNTER (OUTPATIENT)
Dept: LAB | Age: 67
End: 2022-07-19
Attending: INTERNAL MEDICINE
Payer: COMMERCIAL

## 2022-07-19 VITALS
BODY MASS INDEX: 32.45 KG/M2 | HEIGHT: 75 IN | SYSTOLIC BLOOD PRESSURE: 106 MMHG | DIASTOLIC BLOOD PRESSURE: 72 MMHG | WEIGHT: 261 LBS | HEART RATE: 69 BPM

## 2022-07-19 DIAGNOSIS — I42.0 DILATED CARDIOMYOPATHY (HCC): ICD-10-CM

## 2022-07-19 DIAGNOSIS — Z12.11 COLON CANCER SCREENING: ICD-10-CM

## 2022-07-19 DIAGNOSIS — I48.20 CHRONIC ATRIAL FIBRILLATION (HCC): ICD-10-CM

## 2022-07-19 DIAGNOSIS — G89.4 CHRONIC PAIN SYNDROME: ICD-10-CM

## 2022-07-19 DIAGNOSIS — Z00.00 ANNUAL PHYSICAL EXAM: Primary | ICD-10-CM

## 2022-07-19 DIAGNOSIS — I10 PRIMARY HYPERTENSION: ICD-10-CM

## 2022-07-19 DIAGNOSIS — E78.2 MIXED HYPERLIPIDEMIA: ICD-10-CM

## 2022-07-19 DIAGNOSIS — G47.33 OSA (OBSTRUCTIVE SLEEP APNEA): ICD-10-CM

## 2022-07-19 DIAGNOSIS — Z23 NEED FOR VACCINATION: ICD-10-CM

## 2022-07-19 DIAGNOSIS — Z00.00 ANNUAL PHYSICAL EXAM: ICD-10-CM

## 2022-07-19 LAB
ALBUMIN SERPL-MCNC: 3.3 G/DL (ref 3.4–5)
ALBUMIN/GLOB SERPL: 0.8 {RATIO} (ref 1–2)
ALP LIVER SERPL-CCNC: 62 U/L
ALT SERPL-CCNC: 24 U/L
ANION GAP SERPL CALC-SCNC: 6 MMOL/L (ref 0–18)
AST SERPL-CCNC: 20 U/L (ref 15–37)
BASOPHILS # BLD AUTO: 0.04 X10(3) UL (ref 0–0.2)
BASOPHILS NFR BLD AUTO: 0.6 %
BILIRUB SERPL-MCNC: 0.6 MG/DL (ref 0.1–2)
BUN BLD-MCNC: 12 MG/DL (ref 7–18)
BUN/CREAT SERPL: 9.8 (ref 10–20)
CALCIUM BLD-MCNC: 9.2 MG/DL (ref 8.5–10.1)
CHLORIDE SERPL-SCNC: 107 MMOL/L (ref 98–112)
CHOLEST SERPL-MCNC: 145 MG/DL (ref ?–200)
CO2 SERPL-SCNC: 26 MMOL/L (ref 21–32)
CREAT BLD-MCNC: 1.23 MG/DL
CREAT UR-SCNC: 170 MG/DL
DEPRECATED RDW RBC AUTO: 47.5 FL (ref 35.1–46.3)
EOSINOPHIL # BLD AUTO: 0.04 X10(3) UL (ref 0–0.7)
EOSINOPHIL NFR BLD AUTO: 0.6 %
ERYTHROCYTE [DISTWIDTH] IN BLOOD BY AUTOMATED COUNT: 13.2 % (ref 11–15)
FASTING PATIENT LIPID ANSWER: NO
FASTING STATUS PATIENT QL REPORTED: NO
GLOBULIN PLAS-MCNC: 4.1 G/DL (ref 2.8–4.4)
GLUCOSE BLD-MCNC: 69 MG/DL (ref 70–99)
HCT VFR BLD AUTO: 48.1 %
HDLC SERPL-MCNC: 39 MG/DL (ref 40–59)
HGB BLD-MCNC: 15.5 G/DL
IMM GRANULOCYTES # BLD AUTO: 0.01 X10(3) UL (ref 0–1)
IMM GRANULOCYTES NFR BLD: 0.2 %
LDLC SERPL CALC-MCNC: 83 MG/DL (ref ?–100)
LYMPHOCYTES # BLD AUTO: 2.08 X10(3) UL (ref 1–4)
LYMPHOCYTES NFR BLD AUTO: 32.1 %
MCH RBC QN AUTO: 31.8 PG (ref 26–34)
MCHC RBC AUTO-ENTMCNC: 32.2 G/DL (ref 31–37)
MCV RBC AUTO: 98.6 FL
MICROALBUMIN UR-MCNC: 2.7 MG/DL
MICROALBUMIN/CREAT 24H UR-RTO: 15.9 UG/MG (ref ?–30)
MONOCYTES # BLD AUTO: 0.68 X10(3) UL (ref 0.1–1)
MONOCYTES NFR BLD AUTO: 10.5 %
NEUTROPHILS # BLD AUTO: 3.63 X10 (3) UL (ref 1.5–7.7)
NEUTROPHILS # BLD AUTO: 3.63 X10(3) UL (ref 1.5–7.7)
NEUTROPHILS NFR BLD AUTO: 56 %
NONHDLC SERPL-MCNC: 106 MG/DL (ref ?–130)
OSMOLALITY SERPL CALC.SUM OF ELEC: 286 MOSM/KG (ref 275–295)
PLATELET # BLD AUTO: 221 10(3)UL (ref 150–450)
POTASSIUM SERPL-SCNC: 4.5 MMOL/L (ref 3.5–5.1)
PROT SERPL-MCNC: 7.4 G/DL (ref 6.4–8.2)
PSA SERPL-MCNC: 1.79 NG/ML (ref ?–4)
RBC # BLD AUTO: 4.88 X10(6)UL
SODIUM SERPL-SCNC: 139 MMOL/L (ref 136–145)
TRIGL SERPL-MCNC: 130 MG/DL (ref 30–149)
TSI SER-ACNC: 2.31 MIU/ML (ref 0.36–3.74)
VLDLC SERPL CALC-MCNC: 21 MG/DL (ref 0–30)
WBC # BLD AUTO: 6.5 X10(3) UL (ref 4–11)

## 2022-07-19 PROCEDURE — 80053 COMPREHEN METABOLIC PANEL: CPT

## 2022-07-19 PROCEDURE — 3074F SYST BP LT 130 MM HG: CPT | Performed by: INTERNAL MEDICINE

## 2022-07-19 PROCEDURE — 84443 ASSAY THYROID STIM HORMONE: CPT

## 2022-07-19 PROCEDURE — 82570 ASSAY OF URINE CREATININE: CPT

## 2022-07-19 PROCEDURE — 3078F DIAST BP <80 MM HG: CPT | Performed by: INTERNAL MEDICINE

## 2022-07-19 PROCEDURE — 90677 PCV20 VACCINE IM: CPT | Performed by: INTERNAL MEDICINE

## 2022-07-19 PROCEDURE — 82043 UR ALBUMIN QUANTITATIVE: CPT

## 2022-07-19 PROCEDURE — 90471 IMMUNIZATION ADMIN: CPT | Performed by: INTERNAL MEDICINE

## 2022-07-19 PROCEDURE — 36415 COLL VENOUS BLD VENIPUNCTURE: CPT

## 2022-07-19 PROCEDURE — 3008F BODY MASS INDEX DOCD: CPT | Performed by: INTERNAL MEDICINE

## 2022-07-19 PROCEDURE — 84153 ASSAY OF PSA TOTAL: CPT

## 2022-07-19 PROCEDURE — 85025 COMPLETE CBC W/AUTO DIFF WBC: CPT

## 2022-07-19 PROCEDURE — 80061 LIPID PANEL: CPT

## 2022-07-19 PROCEDURE — 99397 PER PM REEVAL EST PAT 65+ YR: CPT | Performed by: INTERNAL MEDICINE

## 2022-11-15 ENCOUNTER — HOSPITAL ENCOUNTER (EMERGENCY)
Facility: HOSPITAL | Age: 67
Discharge: HOME | End: 2022-11-15
Attending: EMERGENCY MEDICINE
Payer: COMMERCIAL

## 2022-11-15 ENCOUNTER — APPOINTMENT (OUTPATIENT)
Dept: CT IMAGING | Facility: HOSPITAL | Age: 67
End: 2022-11-15
Attending: EMERGENCY MEDICINE
Payer: COMMERCIAL

## 2022-11-15 ENCOUNTER — APPOINTMENT (OUTPATIENT)
Dept: GENERAL RADIOLOGY | Facility: HOSPITAL | Age: 67
End: 2022-11-15
Attending: EMERGENCY MEDICINE
Payer: COMMERCIAL

## 2022-11-15 VITALS
DIASTOLIC BLOOD PRESSURE: 82 MMHG | HEART RATE: 69 BPM | HEIGHT: 75 IN | BODY MASS INDEX: 29.84 KG/M2 | RESPIRATION RATE: 22 BRPM | OXYGEN SATURATION: 100 % | WEIGHT: 240 LBS | SYSTOLIC BLOOD PRESSURE: 125 MMHG | TEMPERATURE: 98 F

## 2022-11-15 DIAGNOSIS — S22.41XA CLOSED FRACTURE OF MULTIPLE RIBS OF RIGHT SIDE, INITIAL ENCOUNTER: Primary | ICD-10-CM

## 2022-11-15 LAB
ANION GAP SERPL CALC-SCNC: 6 MMOL/L (ref 0–18)
APTT PPP: 31.3 SECONDS (ref 23.3–35.6)
BASOPHILS # BLD AUTO: 0.04 X10(3) UL (ref 0–0.2)
BASOPHILS NFR BLD AUTO: 0.5 %
BUN BLD-MCNC: 17 MG/DL (ref 7–18)
BUN/CREAT SERPL: 14.9 (ref 10–20)
CALCIUM BLD-MCNC: 9.7 MG/DL (ref 8.5–10.1)
CHLORIDE SERPL-SCNC: 109 MMOL/L (ref 98–112)
CO2 SERPL-SCNC: 26 MMOL/L (ref 21–32)
CREAT BLD-MCNC: 1.14 MG/DL
D DIMER PPP FEU-MCNC: 1.32 UG/ML FEU (ref ?–0.68)
DEPRECATED RDW RBC AUTO: 48.6 FL (ref 35.1–46.3)
EOSINOPHIL # BLD AUTO: 0.02 X10(3) UL (ref 0–0.7)
EOSINOPHIL NFR BLD AUTO: 0.3 %
ERYTHROCYTE [DISTWIDTH] IN BLOOD BY AUTOMATED COUNT: 13.2 % (ref 11–15)
GFR SERPLBLD BASED ON 1.73 SQ M-ARVRAT: 70 ML/MIN/1.73M2 (ref 60–?)
GLUCOSE BLD-MCNC: 107 MG/DL (ref 70–99)
HCT VFR BLD AUTO: 53.9 %
HGB BLD-MCNC: 17 G/DL
IMM GRANULOCYTES # BLD AUTO: 0.03 X10(3) UL (ref 0–1)
IMM GRANULOCYTES NFR BLD: 0.4 %
INR BLD: 1.26 (ref 0.85–1.16)
LYMPHOCYTES # BLD AUTO: 1.27 X10(3) UL (ref 1–4)
LYMPHOCYTES NFR BLD AUTO: 16.1 %
MCH RBC QN AUTO: 31.5 PG (ref 26–34)
MCHC RBC AUTO-ENTMCNC: 31.5 G/DL (ref 31–37)
MCV RBC AUTO: 100 FL
MONOCYTES # BLD AUTO: 0.72 X10(3) UL (ref 0.1–1)
MONOCYTES NFR BLD AUTO: 9.1 %
NEUTROPHILS # BLD AUTO: 5.81 X10 (3) UL (ref 1.5–7.7)
NEUTROPHILS # BLD AUTO: 5.81 X10(3) UL (ref 1.5–7.7)
NEUTROPHILS NFR BLD AUTO: 73.6 %
NT-PROBNP SERPL-MCNC: 900 PG/ML (ref ?–125)
OSMOLALITY SERPL CALC.SUM OF ELEC: 294 MOSM/KG (ref 275–295)
PLATELET # BLD AUTO: 169 10(3)UL (ref 150–450)
POTASSIUM SERPL-SCNC: 4.5 MMOL/L (ref 3.5–5.1)
PROTHROMBIN TIME: 15.7 SECONDS (ref 11.6–14.8)
Q-T INTERVAL: 390 MS
QRS DURATION: 96 MS
QTC CALCULATION (BEZET): 435 MS
R AXIS: -18 DEGREES
RBC # BLD AUTO: 5.39 X10(6)UL
SODIUM SERPL-SCNC: 141 MMOL/L (ref 136–145)
T AXIS: 0 DEGREES
TROPONIN I HIGH SENSITIVITY: 8 NG/L
VENTRICULAR RATE: 75 BPM
WBC # BLD AUTO: 7.9 X10(3) UL (ref 4–11)

## 2022-11-15 PROCEDURE — 99285 EMERGENCY DEPT VISIT HI MDM: CPT

## 2022-11-15 PROCEDURE — 93010 ELECTROCARDIOGRAM REPORT: CPT | Performed by: EMERGENCY MEDICINE

## 2022-11-15 PROCEDURE — 96374 THER/PROPH/DIAG INJ IV PUSH: CPT

## 2022-11-15 PROCEDURE — 96375 TX/PRO/DX INJ NEW DRUG ADDON: CPT

## 2022-11-15 PROCEDURE — 83880 ASSAY OF NATRIURETIC PEPTIDE: CPT | Performed by: EMERGENCY MEDICINE

## 2022-11-15 PROCEDURE — 93005 ELECTROCARDIOGRAM TRACING: CPT

## 2022-11-15 PROCEDURE — 85730 THROMBOPLASTIN TIME PARTIAL: CPT | Performed by: EMERGENCY MEDICINE

## 2022-11-15 PROCEDURE — 71260 CT THORAX DX C+: CPT | Performed by: EMERGENCY MEDICINE

## 2022-11-15 PROCEDURE — 85379 FIBRIN DEGRADATION QUANT: CPT | Performed by: EMERGENCY MEDICINE

## 2022-11-15 PROCEDURE — 85025 COMPLETE CBC W/AUTO DIFF WBC: CPT | Performed by: EMERGENCY MEDICINE

## 2022-11-15 PROCEDURE — 85610 PROTHROMBIN TIME: CPT | Performed by: EMERGENCY MEDICINE

## 2022-11-15 PROCEDURE — 71101 X-RAY EXAM UNILAT RIBS/CHEST: CPT | Performed by: EMERGENCY MEDICINE

## 2022-11-15 PROCEDURE — 84484 ASSAY OF TROPONIN QUANT: CPT | Performed by: EMERGENCY MEDICINE

## 2022-11-15 PROCEDURE — 80048 BASIC METABOLIC PNL TOTAL CA: CPT | Performed by: EMERGENCY MEDICINE

## 2022-11-15 RX ORDER — FUROSEMIDE 10 MG/ML
20 INJECTION INTRAMUSCULAR; INTRAVENOUS ONCE
Status: COMPLETED | OUTPATIENT
Start: 2022-11-15 | End: 2022-11-15

## 2022-11-15 RX ORDER — MORPHINE SULFATE 2 MG/ML
2 INJECTION, SOLUTION INTRAMUSCULAR; INTRAVENOUS ONCE
Status: COMPLETED | OUTPATIENT
Start: 2022-11-15 | End: 2022-11-15

## 2022-11-15 NOTE — ED INITIAL ASSESSMENT (HPI)
Pt with mechanical fall on Sunday, tripped on sidewalk injuring right ribs. Denies LOC, head injury. Pt taking Eliquis.  Currently denies chest pain, SOB

## 2022-11-15 NOTE — DISCHARGE INSTRUCTIONS
Return to emergency department for any worsening symptoms including but not limited to worsening chest pain, shortness of breath, worsening symptoms with exertion, lower extremity swelling, weakness, numbness, abdominal pain, etc. Please follow with your primary care provider in the next few days for reevaluation of your symptoms. These follow with your cardiologist regarding your BMP results today. The Emergency Department is not intended to be a substitute for an effort to provide complete medical care. The imaging, if any, have often been interpreted on a preliminary basis pending final reading by the radiologist. If your blood pressure was greater than 140/90, please have this blood pressure rechecked by your primary care provider in the next several days.

## 2022-11-21 DIAGNOSIS — F32.89 OTHER DEPRESSION: ICD-10-CM

## 2022-11-21 RX ORDER — ESCITALOPRAM OXALATE 10 MG/1
10 TABLET ORAL DAILY
Qty: 90 TABLET | Refills: 1 | Status: SHIPPED | OUTPATIENT
Start: 2022-11-21

## 2022-11-21 NOTE — TELEPHONE ENCOUNTER
Refill passed per [address] protocol.       Requested Prescriptions   Pending Prescriptions Disp Refills    ESCITALOPRAM 10 MG Oral Tab [Pharmacy Med Name: Escitalopram Oxalate 10 MG Oral Tablet] 90 tablet 3     Sig: TAKE 1 TABLET BY MOUTH  DAILY       Psychiatric Non-Scheduled (Anti-Anxiety) Passed - 11/21/2022  4:38 AM        Passed - In person appointment or virtual visit in the past 6 mos or appointment in next 3 mos     Recent Outpatient Visits              4 months ago Annual physical exam    Guevara Flowers MD    Office Visit    9 months ago Chronic pain syndrome    Adelina Angeles Twyla Richters, MD    Office Visit    1 year ago Bilateral lumbar radiculopathy    [facility] Selvin Mari MD    Office Visit    1 year ago Primary hypertension    Saul Angeles MD    Office Visit    1 year ago Dilated cardiomyopathy [facility])    Cardiology - Xena Salazar, Aniceto Cabral MD    Office Visit          Future Appointments         Provider Department Appt Notes    In 1 month Idalmis Harden MD [address], [address] [place] [address] Nilesh Charles 6 month f/u                     Future Appointments         Provider Department Appt Notes    In 1 month Idalmis Harden MD [address] 6 month f/u            Recent Outpatient Visits              4 months ago Annual physical exam    Guevara Flowers MD    Office Visit    9 months ago Chronic pain syndrome    Guevara Flowers MD    Office Visit    1 year ago Bilateral lumbar radiculopathy    [facility] Selvin Mari MD    Office Visit    1 year ago Primary hypertension    Saul Angeles MD    Office Visit    1 year ago Dilated cardiomyopathy [facility]) Cardiology - Cecilio Ko MD    Office Visit

## 2023-01-19 ENCOUNTER — OFFICE VISIT (OUTPATIENT)
Dept: INTERNAL MEDICINE CLINIC | Facility: CLINIC | Age: 68
End: 2023-01-19

## 2023-01-19 VITALS
WEIGHT: 265 LBS | BODY MASS INDEX: 32.95 KG/M2 | DIASTOLIC BLOOD PRESSURE: 74 MMHG | SYSTOLIC BLOOD PRESSURE: 104 MMHG | HEIGHT: 75 IN

## 2023-01-19 DIAGNOSIS — I10 PRIMARY HYPERTENSION: Primary | ICD-10-CM

## 2023-01-19 DIAGNOSIS — I48.20 CHRONIC ATRIAL FIBRILLATION (HCC): ICD-10-CM

## 2023-01-19 DIAGNOSIS — Z12.11 COLON CANCER SCREENING: ICD-10-CM

## 2023-01-19 DIAGNOSIS — I42.0 DILATED CARDIOMYOPATHY (HCC): ICD-10-CM

## 2023-01-19 PROCEDURE — 99214 OFFICE O/P EST MOD 30 MIN: CPT | Performed by: INTERNAL MEDICINE

## 2023-01-19 PROCEDURE — 3008F BODY MASS INDEX DOCD: CPT | Performed by: INTERNAL MEDICINE

## 2023-01-19 PROCEDURE — 3074F SYST BP LT 130 MM HG: CPT | Performed by: INTERNAL MEDICINE

## 2023-01-19 PROCEDURE — 1126F AMNT PAIN NOTED NONE PRSNT: CPT | Performed by: INTERNAL MEDICINE

## 2023-01-19 PROCEDURE — 3078F DIAST BP <80 MM HG: CPT | Performed by: INTERNAL MEDICINE

## 2023-01-19 RX ORDER — SPIRONOLACTONE 25 MG/1
25 TABLET ORAL DAILY
Qty: 90 TABLET | Refills: 1 | Status: SHIPPED | OUTPATIENT
Start: 2023-01-19

## 2023-01-23 RX ORDER — ATORVASTATIN CALCIUM 10 MG/1
10 TABLET, FILM COATED ORAL NIGHTLY
Qty: 90 TABLET | Refills: 3 | OUTPATIENT
Start: 2023-01-23

## 2023-01-23 NOTE — TELEPHONE ENCOUNTER
Duplicate request, previously addressed. atorvastatin 10 MG Oral Tab 90 tablet 3 3/15/2022     Sig - Route: Take 1 tablet (10 mg total) by mouth nightly. - Oral    Sent to pharmacy as:  Atorvastatin Calcium 10 MG Oral Tablet (LIPITOR)    E-Prescribing Status: Receipt confirmed by pharmacy (3/15/2022  5:14 PM CDT)

## 2023-04-11 ENCOUNTER — TELEPHONE (OUTPATIENT)
Facility: CLINIC | Age: 68
End: 2023-04-11

## 2023-04-11 ENCOUNTER — OFFICE VISIT (OUTPATIENT)
Facility: CLINIC | Age: 68
End: 2023-04-11

## 2023-04-11 ENCOUNTER — TELEPHONE (OUTPATIENT)
Dept: GASTROENTEROLOGY | Facility: CLINIC | Age: 68
End: 2023-04-11

## 2023-04-11 VITALS
SYSTOLIC BLOOD PRESSURE: 124 MMHG | HEIGHT: 75 IN | WEIGHT: 260 LBS | DIASTOLIC BLOOD PRESSURE: 80 MMHG | BODY MASS INDEX: 32.33 KG/M2

## 2023-04-11 DIAGNOSIS — Z12.11 COLON CANCER SCREENING: Primary | ICD-10-CM

## 2023-04-11 DIAGNOSIS — Z12.11 SCREEN FOR COLON CANCER: Primary | ICD-10-CM

## 2023-04-11 PROCEDURE — 99213 OFFICE O/P EST LOW 20 MIN: CPT | Performed by: NURSE PRACTITIONER

## 2023-04-11 PROCEDURE — 3008F BODY MASS INDEX DOCD: CPT | Performed by: NURSE PRACTITIONER

## 2023-04-11 PROCEDURE — 3074F SYST BP LT 130 MM HG: CPT | Performed by: NURSE PRACTITIONER

## 2023-04-11 PROCEDURE — 3079F DIAST BP 80-89 MM HG: CPT | Performed by: NURSE PRACTITIONER

## 2023-04-11 RX ORDER — POLYETHYLENE GLYCOL 3350, SODIUM CHLORIDE, SODIUM BICARBONATE, POTASSIUM CHLORIDE 420; 11.2; 5.72; 1.48 G/4L; G/4L; G/4L; G/4L
POWDER, FOR SOLUTION ORAL
Qty: 4000 ML | Refills: 0 | Status: SHIPPED | OUTPATIENT
Start: 2023-04-11

## 2023-04-11 NOTE — TELEPHONE ENCOUNTER
Scheduled for:  Colonoscopy [id number]  Provider Name:  Dr. Kaity Snyder  Date:  8/8/2023  Location:  [facility]  Sedation:  MAC  Time:  9:00 am, (pt is aware that [facility] will call the day before to confirm arrival time)  Prep:  Trilyte  Meds/Allergies Reconciled?:  Liane/ZAIRA  Diagnosis with codes:  Screening for colon cancer Z12.11  Was patient informed to call insurance with codes (Y/N):  Yes  Referral sent?:  Referral was sent at the time of electronic surgical scheduling. [facility] or [facility] Th  notified?:  I sent an electronic request to Endo Scheduling and received a confirmation today. Medication Orders:  See other TE from 4/11/2023 for Eliquis orders. Misc Orders:  N/A     Further instructions given by staff:  I discussed the prep instructions with the patient which he verbally understood and provided patient with prep instruction sheet.

## 2023-04-11 NOTE — PATIENT INSTRUCTIONS
1. Schedule colonoscopy with MAC w/ Dr. Prosper Wilson or Dr. Montse Morales [Diagnosis: crc screening]    2.  bowel prep from pharmacy (split trilyte)    3. RN staff to contact Dr. Trevor Hawk for okay to hold eliquis 48 h prior to cln. 4. Read all bowel prep instructions carefully    5. AVOID seeds, nuts, popcorn, raw fruits and vegetables (cooked is okay) for 2-3 days before procedure    6. You will need to be tested for COVID within 72 hours of your procedure. You will be contacted with instructions on how to do this.       >>>Please note: if you were prescribed Suprep for the bowel prep and it is too expensive or not covered by insurance, it is okay to substitute Trilyte (or any similar generic prep). This can be done by notifying the pharmacy or calling our office.

## 2023-04-11 NOTE — TELEPHONE ENCOUNTER
Dr. Jillian Cespedes,    Please advise on clearance to hold Eliquis 48 hours prior to colonoscopy. Scheduled colonoscopy 8/8/23 with Dr. Cuco Byrd. Thank you.

## 2023-04-11 NOTE — TELEPHONE ENCOUNTER
Nursing:  Please contact Dr. Nilda Salazar for okay to hold eliquis 48 h prior to cln.     Thanks,  C

## 2023-04-18 NOTE — TELEPHONE ENCOUNTER
Noted. Mychart sent to patient. Will await confirmation that message is read. Procedure is on 8/8/23.

## 2023-04-19 ENCOUNTER — OFFICE VISIT (OUTPATIENT)
Dept: INTERNAL MEDICINE CLINIC | Facility: CLINIC | Age: 68
End: 2023-04-19

## 2023-04-19 ENCOUNTER — LAB ENCOUNTER (OUTPATIENT)
Dept: LAB | Age: 68
End: 2023-04-19
Attending: INTERNAL MEDICINE
Payer: MEDICARE

## 2023-04-19 VITALS
SYSTOLIC BLOOD PRESSURE: 104 MMHG | HEIGHT: 75 IN | HEART RATE: 69 BPM | OXYGEN SATURATION: 98 % | DIASTOLIC BLOOD PRESSURE: 76 MMHG | WEIGHT: 259 LBS | BODY MASS INDEX: 32.2 KG/M2

## 2023-04-19 DIAGNOSIS — E78.2 MIXED HYPERLIPIDEMIA: ICD-10-CM

## 2023-04-19 DIAGNOSIS — F32.89 OTHER DEPRESSION: ICD-10-CM

## 2023-04-19 DIAGNOSIS — G89.4 CHRONIC PAIN SYNDROME: ICD-10-CM

## 2023-04-19 DIAGNOSIS — I10 PRIMARY HYPERTENSION: ICD-10-CM

## 2023-04-19 DIAGNOSIS — I42.0 DILATED CARDIOMYOPATHY (HCC): ICD-10-CM

## 2023-04-19 DIAGNOSIS — Z00.00 ENCOUNTER FOR ANNUAL HEALTH EXAMINATION: ICD-10-CM

## 2023-04-19 DIAGNOSIS — E66.01 MORBID OBESITY DUE TO EXCESS CALORIES (HCC): ICD-10-CM

## 2023-04-19 DIAGNOSIS — Z12.11 COLON CANCER SCREENING: ICD-10-CM

## 2023-04-19 DIAGNOSIS — G47.33 OSA (OBSTRUCTIVE SLEEP APNEA): ICD-10-CM

## 2023-04-19 DIAGNOSIS — Z00.00 ENCOUNTER FOR ANNUAL HEALTH EXAMINATION: Primary | ICD-10-CM

## 2023-04-19 DIAGNOSIS — I48.20 CHRONIC ATRIAL FIBRILLATION (HCC): ICD-10-CM

## 2023-04-19 PROBLEM — S76.302A HAMSTRING INJURY, LEFT, INITIAL ENCOUNTER: Status: RESOLVED | Noted: 2020-07-02 | Resolved: 2023-04-19

## 2023-04-19 PROBLEM — E55.9 VITAMIN D DEFICIENCY: Status: RESOLVED | Noted: 2019-01-08 | Resolved: 2023-04-19

## 2023-04-19 PROBLEM — M25.571 ACUTE RIGHT ANKLE PAIN: Status: RESOLVED | Noted: 2019-01-08 | Resolved: 2023-04-19

## 2023-04-19 PROBLEM — G72.9 MYOPATHY: Status: RESOLVED | Noted: 2020-07-02 | Resolved: 2023-04-19

## 2023-04-19 PROBLEM — M25.562 CHRONIC PAIN OF LEFT KNEE: Status: RESOLVED | Noted: 2020-07-02 | Resolved: 2023-04-19

## 2023-04-19 PROBLEM — M17.11 ARTHRITIS OF RIGHT KNEE: Status: RESOLVED | Noted: 2018-03-16 | Resolved: 2023-04-19

## 2023-04-19 PROBLEM — M24.811 INTERNAL DERANGEMENT OF RIGHT SHOULDER: Status: RESOLVED | Noted: 2020-07-02 | Resolved: 2023-04-19

## 2023-04-19 PROBLEM — L30.9 DERMATITIS: Status: RESOLVED | Noted: 2017-02-14 | Resolved: 2023-04-19

## 2023-04-19 PROBLEM — M25.562 LEFT KNEE PAIN: Status: RESOLVED | Noted: 2020-06-15 | Resolved: 2023-04-19

## 2023-04-19 PROBLEM — L57.0 ACTINIC KERATOSIS: Status: RESOLVED | Noted: 2017-02-14 | Resolved: 2023-04-19

## 2023-04-19 PROBLEM — G89.29 CHRONIC PAIN OF LEFT KNEE: Status: RESOLVED | Noted: 2020-07-02 | Resolved: 2023-04-19

## 2023-04-19 PROBLEM — M17.12 PRIMARY OSTEOARTHRITIS OF LEFT KNEE: Status: RESOLVED | Noted: 2020-11-13 | Resolved: 2023-04-19

## 2023-04-19 PROBLEM — E53.8 VITAMIN B12 DEFICIENCY: Status: RESOLVED | Noted: 2019-01-08 | Resolved: 2023-04-19

## 2023-04-19 LAB
ALBUMIN SERPL-MCNC: 3.6 G/DL (ref 3.4–5)
ALBUMIN/GLOB SERPL: 0.9 {RATIO} (ref 1–2)
ALP LIVER SERPL-CCNC: 62 U/L
ALT SERPL-CCNC: 23 U/L
ANION GAP SERPL CALC-SCNC: 7 MMOL/L (ref 0–18)
AST SERPL-CCNC: 16 U/L (ref 15–37)
BASOPHILS # BLD AUTO: 0.04 X10(3) UL (ref 0–0.2)
BASOPHILS NFR BLD AUTO: 0.7 %
BILIRUB SERPL-MCNC: 0.6 MG/DL (ref 0.1–2)
BUN BLD-MCNC: 19 MG/DL (ref 7–18)
BUN/CREAT SERPL: 14.3 (ref 10–20)
CALCIUM BLD-MCNC: 9.4 MG/DL (ref 8.5–10.1)
CHLORIDE SERPL-SCNC: 111 MMOL/L (ref 98–112)
CHOLEST SERPL-MCNC: 145 MG/DL (ref ?–200)
CO2 SERPL-SCNC: 26 MMOL/L (ref 21–32)
CREAT BLD-MCNC: 1.33 MG/DL
CREAT UR-SCNC: 199 MG/DL
DEPRECATED RDW RBC AUTO: 47.6 FL (ref 35.1–46.3)
EOSINOPHIL # BLD AUTO: 0.02 X10(3) UL (ref 0–0.7)
EOSINOPHIL NFR BLD AUTO: 0.3 %
ERYTHROCYTE [DISTWIDTH] IN BLOOD BY AUTOMATED COUNT: 13.3 % (ref 11–15)
FASTING PATIENT LIPID ANSWER: NO
FASTING STATUS PATIENT QL REPORTED: NO
GFR SERPLBLD BASED ON 1.73 SQ M-ARVRAT: 58 ML/MIN/1.73M2 (ref 60–?)
GLOBULIN PLAS-MCNC: 3.8 G/DL (ref 2.8–4.4)
GLUCOSE BLD-MCNC: 107 MG/DL (ref 70–99)
HCT VFR BLD AUTO: 50.3 %
HDLC SERPL-MCNC: 42 MG/DL (ref 40–59)
HGB BLD-MCNC: 16.4 G/DL
IMM GRANULOCYTES # BLD AUTO: 0.02 X10(3) UL (ref 0–1)
IMM GRANULOCYTES NFR BLD: 0.3 %
LDLC SERPL CALC-MCNC: 87 MG/DL (ref ?–100)
LYMPHOCYTES # BLD AUTO: 1.67 X10(3) UL (ref 1–4)
LYMPHOCYTES NFR BLD AUTO: 28.3 %
MCH RBC QN AUTO: 31.5 PG (ref 26–34)
MCHC RBC AUTO-ENTMCNC: 32.6 G/DL (ref 31–37)
MCV RBC AUTO: 96.7 FL
MICROALBUMIN UR-MCNC: 2.08 MG/DL
MICROALBUMIN/CREAT 24H UR-RTO: 10.5 UG/MG (ref ?–30)
MONOCYTES # BLD AUTO: 0.61 X10(3) UL (ref 0.1–1)
MONOCYTES NFR BLD AUTO: 10.3 %
NEUTROPHILS # BLD AUTO: 3.54 X10 (3) UL (ref 1.5–7.7)
NEUTROPHILS # BLD AUTO: 3.54 X10(3) UL (ref 1.5–7.7)
NEUTROPHILS NFR BLD AUTO: 60.1 %
NONHDLC SERPL-MCNC: 103 MG/DL (ref ?–130)
OSMOLALITY SERPL CALC.SUM OF ELEC: 301 MOSM/KG (ref 275–295)
PLATELET # BLD AUTO: 214 10(3)UL (ref 150–450)
POTASSIUM SERPL-SCNC: 4.7 MMOL/L (ref 3.5–5.1)
PROT SERPL-MCNC: 7.4 G/DL (ref 6.4–8.2)
PSA SERPL-MCNC: 0.76 NG/ML (ref ?–4)
RBC # BLD AUTO: 5.2 X10(6)UL
SODIUM SERPL-SCNC: 144 MMOL/L (ref 136–145)
TRIGL SERPL-MCNC: 80 MG/DL (ref 30–149)
TSI SER-ACNC: 1.85 MIU/ML (ref 0.36–3.74)
VLDLC SERPL CALC-MCNC: 13 MG/DL (ref 0–30)
WBC # BLD AUTO: 5.9 X10(3) UL (ref 4–11)

## 2023-04-19 PROCEDURE — 80061 LIPID PANEL: CPT

## 2023-04-19 PROCEDURE — 84443 ASSAY THYROID STIM HORMONE: CPT

## 2023-04-19 PROCEDURE — 84153 ASSAY OF PSA TOTAL: CPT

## 2023-04-19 PROCEDURE — 85025 COMPLETE CBC W/AUTO DIFF WBC: CPT

## 2023-04-19 PROCEDURE — 36415 COLL VENOUS BLD VENIPUNCTURE: CPT

## 2023-04-19 PROCEDURE — 82043 UR ALBUMIN QUANTITATIVE: CPT

## 2023-04-19 PROCEDURE — 82570 ASSAY OF URINE CREATININE: CPT

## 2023-04-19 PROCEDURE — 80053 COMPREHEN METABOLIC PANEL: CPT

## 2023-07-13 NOTE — TELEPHONE ENCOUNTER
Contacted patient and reviewed Eliquis orders and reviewed other medication instructions. Patient verbalized understanding.

## 2023-07-17 DIAGNOSIS — I10 PRIMARY HYPERTENSION: ICD-10-CM

## 2023-07-17 DIAGNOSIS — I42.0 DILATED CARDIOMYOPATHY (HCC): ICD-10-CM

## 2023-07-17 DIAGNOSIS — I48.20 CHRONIC ATRIAL FIBRILLATION (HCC): ICD-10-CM

## 2023-07-18 NOTE — TELEPHONE ENCOUNTER
Refill passed per [facility], [facility] protocol.   High drug interaction warning with Entresto    Requested Prescriptions   Pending Prescriptions Disp Refills    SPIRONOLACTONE 25 MG Oral Tab [Pharmacy Med Name: Spironolactone 25 Mg Tab Amne] 90 tablet 0     Sig: TAKE ONE TABLET BY MOUTH ONE TIME DAILY       Hypertensive Medications Protocol Passed - 7/17/2023  9:55 AM        Passed - In person appointment in the past 12 or next 3 months     Recent Outpatient Visits              3 months ago Encounter for annual health examination    Milena Jones MD    Office Visit    3 months ago Colon cancer screening    [address] [facility] [place] [facility]    Office Visit    6 months ago Primary hypertension    Adelina Trammell Rosalinda Sabina, MD    Office Visit    12 months ago Annual physical exam    Adelina Trammell Rosalinda Sabina, MD    Office Visit    1 year ago Chronic pain syndrome    Andrew Trammell MD    Office Visit          Future Appointments         Provider Department Appt Notes    In 3 weeks [address], [address] [facility],[address], [place] [facility] @ [address] [place] [address]    In 3 months Dinorah Chandler MD [facility], [address] [place] [address] 6 mth follow up               Passed - Last BP reading less than 140/90     BP Readings from Last 1 Encounters:  04/19/23 : 104/76              Passed - CMP or BMP in past 6 months     Recent Results (from the past 4392 hour(s))   COMP METABOLIC PANEL (14)    Collection Time: 04/19/23 10:18 AM   Result Value Ref Range    Glucose 107 (H) 70 - 99 mg/dL    Sodium 144 136 - 145 mmol/L    Potassium 4.7 3.5 - 5.1 mmol/L    Chloride 111 98 - 112 mmol/L    CO2 26.0 21.0 - 32.0 mmol/L    Anion Gap 7 0 - 18 mmol/L    BUN 19 (H) 7 - 18 mg/dL Creatinine 1.33 (H) 0.70 - 1.30 mg/dL    BUN/CREA Ratio 14.3 10.0 - 20.0    Calcium, Total 9.4 8.5 - 10.1 mg/dL    Calculated Osmolality 301 (H) 275 - 295 mOsm/kg    eGFR-Cr 58 (L) >=60 mL/min/1.73m2    ALT 23 16 - 61 U/L    AST 16 15 - 37 U/L    Alkaline Phosphatase 62 45 - 117 U/L    Bilirubin, Total 0.6 0.1 - 2.0 mg/dL    Total Protein 7.4 6.4 - 8.2 g/dL    Albumin 3.6 3.4 - 5.0 g/dL    Globulin  3.8 2.8 - 4.4 g/dL    A/G Ratio 0.9 (L) 1.0 - 2.0    Patient Fasting for CMP? No      *Note: Due to a large number of results and/or encounters for the requested time period, some results have not been displayed. A complete set of results can be found in Results Review.                Passed - In person appointment or virtual visit in the past 6 months     Recent Outpatient Visits              3 months ago Encounter for annual health examination    Kenya Rehman MD    Office Visit    3 months ago Colon cancer screening    Cristian Freed Donia Hamper, APRN    Office Visit    6 months ago Primary hypertension    Kenya Rehman MD    Office Visit    12 months ago Annual physical exam    Kenya Rehman MD    Office Visit    1 year ago Chronic pain syndrome    Alaina Rehman MD    Office Visit          Future Appointments         Provider Department Appt Notes    In 3 weeks [address], [address], [place] [facility] @ [facility]    In 3 months Jenifer Acevedo MD [facility] 6 mth follow up               Passed [facility] or GFRNAA > 50     GFR Evaluation  EGFRCR: 58 , resulted on 4/19/2023               Recent Outpatient Visits              3 months ago Encounter for annual health examination Adelina Reddy Evander Gray, MD    Office Visit    3 months ago Colon cancer screening    Cristian Brock, ZAIRA Hester    Office Visit    6 months ago Primary hypertension    Kneya Reddy MD    Office Visit    12 months ago Annual physical exam    Kenya Reddy MD    Office Visit    1 year ago Chronic pain syndrome    Kenya Reddy MD    Office Visit            Future Appointments         Provider Department Appt Notes    In 3 weeks [address], [facility],[address], [place] [facility] @ [facility]    In 3 months Muriel Ansari MD [facility], [address] [place] [address] 6 mth follow up

## 2023-07-19 RX ORDER — SPIRONOLACTONE 25 MG/1
25 TABLET ORAL DAILY
Qty: 90 TABLET | Refills: 3 | Status: SHIPPED | OUTPATIENT
Start: 2023-07-19

## 2023-08-05 NOTE — PAT NURSING NOTE
This RN spoke with client and clients wife they were informed to hold anticoagulant for 2 days pre op as indicated on note dated 4/17/23.

## 2023-08-08 ENCOUNTER — ANESTHESIA (OUTPATIENT)
Dept: ENDOSCOPY | Facility: HOSPITAL | Age: 68
End: 2023-08-08
Payer: MEDICARE

## 2023-08-08 ENCOUNTER — HOSPITAL ENCOUNTER (OUTPATIENT)
Facility: HOSPITAL | Age: 68
Setting detail: HOSPITAL OUTPATIENT SURGERY
Discharge: HOME | End: 2023-08-08
Attending: INTERNAL MEDICINE | Admitting: INTERNAL MEDICINE
Payer: MEDICARE

## 2023-08-08 ENCOUNTER — ANESTHESIA EVENT (OUTPATIENT)
Dept: ENDOSCOPY | Facility: HOSPITAL | Age: 68
End: 2023-08-08
Payer: MEDICARE

## 2023-08-08 VITALS
WEIGHT: 250 LBS | OXYGEN SATURATION: 99 % | HEIGHT: 75.5 IN | SYSTOLIC BLOOD PRESSURE: 100 MMHG | RESPIRATION RATE: 21 BRPM | BODY MASS INDEX: 30.76 KG/M2 | HEART RATE: 74 BPM | DIASTOLIC BLOOD PRESSURE: 82 MMHG

## 2023-08-08 DIAGNOSIS — Z12.11 SCREEN FOR COLON CANCER: ICD-10-CM

## 2023-08-08 PROBLEM — K57.30 DIVERTICULA OF COLON: Status: ACTIVE | Noted: 2023-08-08

## 2023-08-08 PROBLEM — K63.5 POLYP OF COLON: Status: ACTIVE | Noted: 2023-08-08

## 2023-08-08 PROCEDURE — 0DBN8ZX EXCISION OF SIGMOID COLON, VIA NATURAL OR ARTIFICIAL OPENING ENDOSCOPIC, DIAGNOSTIC: ICD-10-PCS | Performed by: INTERNAL MEDICINE

## 2023-08-08 PROCEDURE — 0DBK8ZX EXCISION OF ASCENDING COLON, VIA NATURAL OR ARTIFICIAL OPENING ENDOSCOPIC, DIAGNOSTIC: ICD-10-PCS | Performed by: INTERNAL MEDICINE

## 2023-08-08 PROCEDURE — 45385 COLONOSCOPY W/LESION REMOVAL: CPT | Performed by: INTERNAL MEDICINE

## 2023-08-08 RX ORDER — SODIUM CHLORIDE, SODIUM LACTATE, POTASSIUM CHLORIDE, CALCIUM CHLORIDE 600; 310; 30; 20 MG/100ML; MG/100ML; MG/100ML; MG/100ML
INJECTION, SOLUTION INTRAVENOUS CONTINUOUS
Status: DISCONTINUED | OUTPATIENT
Start: 2023-08-08 | End: 2023-08-08

## 2023-08-08 RX ORDER — LIDOCAINE HYDROCHLORIDE 10 MG/ML
INJECTION, SOLUTION EPIDURAL; INFILTRATION; INTRACAUDAL; PERINEURAL AS NEEDED
Status: DISCONTINUED | OUTPATIENT
Start: 2023-08-08 | End: 2023-08-08 | Stop reason: SURG

## 2023-08-08 RX ADMIN — SODIUM CHLORIDE, SODIUM LACTATE, POTASSIUM CHLORIDE, CALCIUM CHLORIDE: 600; 310; 30; 20 INJECTION, SOLUTION INTRAVENOUS at 09:32:00

## 2023-08-08 RX ADMIN — LIDOCAINE HYDROCHLORIDE 50 MG: 10 INJECTION, SOLUTION EPIDURAL; INFILTRATION; INTRACAUDAL; PERINEURAL at 09:00:00

## 2023-08-08 NOTE — OPERATIVE REPORT
COLONOSCOPY REPORT    Brian Patel     3/22/1955 Age 70year old   PCP Griffin Ibarra MD Endoscopist Aris Patterson MD     Date of procedure: 23    Procedure: Colonoscopy w/cold snare polypectomy    Pre-operative diagnosis: Screening    Post-operative diagnosis: Colon polyps x3, diverticulosis, internal hemorrhoids    Medications: MAC    Withdrawal time: 16 minutes    Procedure:  Informed consent was obtained from the patient after the risks of the procedure were discussed, including but not limited to bleeding, perforation, aspiration, infection, or possibility of a missed lesion. After discussions of the risks/benefits and alternatives to this procedure, as well as the planned sedation, the patient was placed in the left lateral decubitus position and begun on continuous blood pressure pulse oximetry and EKG monitoring and this was maintained throughout the procedure. Once an adequate level of sedation was obtained a digital rectal exam was completed. Then the lubricated tip of the [id number] diagnostic video colonoscope was inserted and advanced without difficulty to the cecum using the CO2 insufflation technique. The cecum was identified by localizing the trifold, the appendix and the ileocecal valve. Withdrawal was begun with thorough washing and careful examination of the colonic walls and folds. A routine second examination of the cecum/ascending colon was performed. Photodocumentation was obtained. The bowel prep was good. Views of the colon were good with washing. I then carefully withdrew the instrument from the patient who tolerated the procedure well. Complications: none. Findings:   1. Three polyp(s) noted as follows:      A. 8 mm polyp in the ascending colon; sessile morphology; cold snare polypectomy and retrieved. Persistent oozing from polypectomy site controlled with a single endoclip placement.          B. 8 mm polyp in the sigmoid colon; sessile morphology; cold snare polypectomy and retrieved. Persistent oozing from polypectomy site controlled with 2 endoclip placement. C. 7 mm polyp in the sigmoid colon; flat morphology; cold snare polypectomy and retrieved. Persistent oozing from polypectomy site controlled with a single endoclip placement. 2. Diverticulosis: moderate in the left colon. 3. Terminal ileum: the visualized mucosa appeared normal.    4. The colonic mucosa throughout the colon showed normal vascular pattern, without evidence of angioectasias or inflammation. 5. A retroflexed view of the rectum revealed small internal hemorrhoids. 6. JJ: normal rectal tone, no masses palpated. Impression: Three small colon polyps removed. Small internal hemorrhoids and diverticulosis. Recommend:  Await pathology. The interval for the next colonoscopy will be determined after reviewing pathology. If new signs or symptoms develop, colonoscopy may need to be repeated sooner. Eliquis held for 2 days, resume tomorrow. High fiber diet. Monitor for blood in the stool. If having more than just tinge of blood, call office or go to the ER.    >>>If tissue was obtained and you have not received your pathology results either by phone or letter within 2 weeks, please call our office at [phone]. Specimens: colon    Blood loss: <1 ml      ----------------------------------------------------------------------------------------------------------------------------------    INTERVAL FOR COLONOSCOPY:   - If there is no family history of colon cancer and no colon polyps identified in an adequately prepped colon - colonoscopy should be repeated in 10 years.  Various factors should be considered regarding repeat colonoscopy - including co-morbid conditions, ability to tolerate procedure with advanced age, and desire for repeat testing.   - If no colon polyps were identified and a positive family history of colon cancer - the colonoscopy should be repeated in 5 years.   - If colon polyps were removed, the colonoscopy should be repeated sooner depending on size/type/location of polyp.

## 2023-08-08 NOTE — DISCHARGE INSTRUCTIONS
Home Care Instructions for Colonoscopy with Sedation    Diet:  - Resume your regular diet as tolerated unless otherwise instructed. - Start with light meals to minimize bloating.  - Do not drink alcohol today. RESUME ELIQUIS 8/9/23    Medication:  - If you have questions about resuming your normal medications, please contact your Primary Care Physician. Activities:  - Take it easy today. Do not return to work today. - Do not drive today. - Do not operate any machinery today (including kitchen equipment). Colonoscopy:  - You may notice some rectal \"spotting\" (a little blood on the toilet tissue) for a day or two after the exam. This is normal.  - If you experience any rectal bleeding (not spotting), persistent tenderness or sharp severe abdominal pains, oral temperature over 100 degrees Fahrenheit, light-headedness or dizziness, or any other problems, contact your doctor. **If unable to reach your doctor, please go to the [facility] AND [facility] Emergency Room**    - Your referring physician will receive a full report of your examination.  - If you do not hear from your doctor's office within two weeks of your biopsy, please call them for your results. You may be able to see your laboratory results in [facility] between 4 and 7 business days. In some cases, your physician may not have viewed the results before they are released to [address] If you have questions regarding your results contact the physician who ordered the test/exam by phone or via [address] by choosing \"Ask a Medical Question. \"

## 2023-08-08 NOTE — ANESTHESIA POSTPROCEDURE EVALUATION
Patient: Benigno Feliz    Procedure Summary       Date: 08/08/23 Room / Location: [address] [place] [address] ENDOSCOPY 01 / [address] [place] [address] ENDOSCOPY    Anesthesia Start: 8006 Anesthesia Stop:     Procedure: COLONOSCOPY-SCREENING Diagnosis:       Screen for colon cancer      (polyp,diverticulosis,hemorrhoids)    Surgeons: Dian Alvarez MD Anesthesiologist: Nicole Gruber CRNA    Anesthesia Type: general ASA Status: 3            Anesthesia Type: general    Vitals Value Taken Time   BP 96/70 08/08/23 0930   Temp  08/08/23 0932   Pulse 79 08/08/23 0932   Resp 24 08/08/23 0932   SpO2 97 % 08/08/23 0932   Vitals shown include unvalidated device data.     [address] [place] [address] AN Post Evaluation:   Patient Evaluated in PACU  Patient Participation: complete - patient participated  Level of Consciousness: responsive to verbal stimuli  Pain Management: adequate  Airway Patency:patent  Yes    Cardiovascular Status: hemodynamically stable  Respiratory Status: room air  Postoperative Hydration acceptable      Hanna Bailey CRNA  8/8/2023 9:32 AM
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
## 2023-08-18 ENCOUNTER — MED REC SCAN ONLY (OUTPATIENT)
Facility: CLINIC | Age: 68
End: 2023-08-18

## 2023-08-22 RX ORDER — ATORVASTATIN CALCIUM 10 MG/1
10 TABLET, FILM COATED ORAL NIGHTLY
Qty: 90 TABLET | Refills: 3 | Status: SHIPPED | OUTPATIENT
Start: 2023-08-22

## 2023-08-23 NOTE — TELEPHONE ENCOUNTER
Refill passed per [facility] protocol   Requested Prescriptions   Pending Prescriptions Disp Refills    ATORVASTATIN 10 MG Oral Tab [Pharmacy Med Name: Atorvastatin Calcium 10 Mg Tab Apot] 30 tablet 0     Sig: TAKE ONE TABLET BY MOUTH EVERY NIGHT AT BEDTIME       Cholesterol Medication Protocol Passed - 8/21/2023  4:32 PM        Passed - ALT in past 12 months        Passed - LDL in past 12 months        Passed - Last ALT < 80     Lab Results   Component Value Date    ALT 23 04/19/2023             Passed - Last LDL < 130     Lab Results   Component Value Date    LDL 87 04/19/2023             Passed - In person appointment or virtual visit in the past 12 mos or appointment in next 3 mos     Recent Outpatient Visits              4 months ago Encounter for annual health examination    Alex Head MD    Office Visit    4 months ago Colon cancer screening    Cristian Bueno [facility], [facility]    Office Visit    7 months ago Primary hypertension    Jonathan Boltonmhjay Carlos MD    Office Visit    1 year ago Annual physical exam    Rei Bolton MD    Office Visit    1 year ago Chronic pain syndrome    Rei Bolton MD    Office Visit          Future Appointments         Provider Department Appt Notes    In 1 month MD Mirelal Rasmussen, [facility]

## 2023-08-29 ENCOUNTER — TELEPHONE (OUTPATIENT)
Dept: GASTROENTEROLOGY | Facility: CLINIC | Age: 68
End: 2023-08-29

## 2023-09-25 DIAGNOSIS — F32.89 OTHER DEPRESSION: ICD-10-CM

## 2023-09-25 RX ORDER — ESCITALOPRAM OXALATE 10 MG/1
10 TABLET ORAL DAILY
Qty: 90 TABLET | Refills: 1 | Status: SHIPPED | OUTPATIENT
Start: 2023-09-25

## 2023-09-26 NOTE — TELEPHONE ENCOUNTER
Refill passed per [place] [email or web address] protocol.      Requested Prescriptions   Pending Prescriptions Disp Refills    ESCITALOPRAM 10 MG Oral Tab [Pharmacy Med Name: Escitalopram Oxalate 10 Mg Tab Auro] 90 tablet 0     Sig: TAKE ONE TABLET BY MOUTH ONE TIME DAILY       Psychiatric Non-Scheduled (Anti-Anxiety) Passed - 9/25/2023  3:20 PM        Passed - In person appointment or virtual visit in the past 6 mos or appointment in next 3 mos     Recent Outpatient Visits              5 months ago Encounter for annual health examination    Adelina Montana Heriberto Spar, MD    Office Visit    5 months ago Colon cancer screening    [address] [place] [address] Jaylin Foster APRN    Office Visit    8 months ago Primary hypertension    Bridgett Montana MD    Office Visit    1 year ago Annual physical exam    Bridgett Montana MD    Office Visit    1 year ago Chronic pain syndrome    Bridgett Montana MD    Office Visit          Future Appointments         Provider Department Appt Notes    In 3 weeks Maddie Hdez MD [address], [place] 6 mth follow up                          Future Appointments         Provider Department Appt Notes    In 3 weeks Maddie Hdez MD [address], Romana Bowman, [place] 6 mth follow up             Recent Outpatient Visits              5 months ago Encounter for annual health examination    Kenya Montnaa MD    Office Visit    5 months ago Colon cancer screening    [address] [place] [address] [place] ZAIRA Coreas    Office Visit    8 months ago Primary hypertension    Kenya Montana MD Office Visit    1 year ago Annual physical exam    Kenya Og MD    Office Visit    1 year ago Chronic pain syndrome    Kenya Og MD    Office Visit

## 2023-10-19 ENCOUNTER — OFFICE VISIT (OUTPATIENT)
Dept: INTERNAL MEDICINE CLINIC | Facility: CLINIC | Age: 68
End: 2023-10-19

## 2023-10-19 VITALS
HEIGHT: 75.5 IN | RESPIRATION RATE: 16 BRPM | DIASTOLIC BLOOD PRESSURE: 71 MMHG | HEART RATE: 95 BPM | SYSTOLIC BLOOD PRESSURE: 98 MMHG | BODY MASS INDEX: 31.87 KG/M2 | WEIGHT: 259 LBS | OXYGEN SATURATION: 90 %

## 2023-10-19 DIAGNOSIS — G89.29 CHRONIC PAIN OF LEFT KNEE: ICD-10-CM

## 2023-10-19 DIAGNOSIS — I10 PRIMARY HYPERTENSION: Primary | ICD-10-CM

## 2023-10-19 DIAGNOSIS — I48.20 CHRONIC ATRIAL FIBRILLATION (HCC): ICD-10-CM

## 2023-10-19 DIAGNOSIS — M25.562 CHRONIC PAIN OF LEFT KNEE: ICD-10-CM

## 2023-10-19 PROCEDURE — 3078F DIAST BP <80 MM HG: CPT | Performed by: INTERNAL MEDICINE

## 2023-10-19 PROCEDURE — 3074F SYST BP LT 130 MM HG: CPT | Performed by: INTERNAL MEDICINE

## 2023-10-19 PROCEDURE — 3008F BODY MASS INDEX DOCD: CPT | Performed by: INTERNAL MEDICINE

## 2023-10-19 PROCEDURE — 1126F AMNT PAIN NOTED NONE PRSNT: CPT | Performed by: INTERNAL MEDICINE

## 2023-10-19 PROCEDURE — 1159F MED LIST DOCD IN RCRD: CPT | Performed by: INTERNAL MEDICINE

## 2023-10-19 PROCEDURE — 1160F RVW MEDS BY RX/DR IN RCRD: CPT | Performed by: INTERNAL MEDICINE

## 2023-10-19 PROCEDURE — 99214 OFFICE O/P EST MOD 30 MIN: CPT | Performed by: INTERNAL MEDICINE

## 2023-10-19 NOTE — PATIENT INSTRUCTIONS
Establish care with a partner of Dr. Kimberly Florez (cardiology). Get knee xray done. Make appointment with orthopedic surgery.   Make appointment with Dr. Powell Sportsrosa for 6 months for a complete physical.

## 2023-11-20 ENCOUNTER — NURSE TRIAGE (OUTPATIENT)
Dept: INTERNAL MEDICINE CLINIC | Facility: CLINIC | Age: 68
End: 2023-11-20

## 2023-11-20 ENCOUNTER — HOSPITAL ENCOUNTER (OUTPATIENT)
Dept: GENERAL RADIOLOGY | Age: 68
Discharge: HOME | End: 2023-11-20
Attending: INTERNAL MEDICINE
Payer: MEDICARE

## 2023-11-20 ENCOUNTER — OFFICE VISIT (OUTPATIENT)
Dept: INTERNAL MEDICINE CLINIC | Facility: CLINIC | Age: 68
End: 2023-11-20

## 2023-11-20 VITALS
BODY MASS INDEX: 32.15 KG/M2 | SYSTOLIC BLOOD PRESSURE: 105 MMHG | RESPIRATION RATE: 16 BRPM | HEART RATE: 62 BPM | DIASTOLIC BLOOD PRESSURE: 74 MMHG | HEIGHT: 76 IN | WEIGHT: 264 LBS

## 2023-11-20 DIAGNOSIS — G89.29 CHRONIC PAIN OF LEFT KNEE: ICD-10-CM

## 2023-11-20 DIAGNOSIS — R19.7 DIARRHEA, UNSPECIFIED TYPE: Primary | ICD-10-CM

## 2023-11-20 DIAGNOSIS — M25.562 CHRONIC PAIN OF LEFT KNEE: ICD-10-CM

## 2023-11-20 PROCEDURE — 3074F SYST BP LT 130 MM HG: CPT | Performed by: NURSE PRACTITIONER

## 2023-11-20 PROCEDURE — 3008F BODY MASS INDEX DOCD: CPT | Performed by: NURSE PRACTITIONER

## 2023-11-20 PROCEDURE — 3078F DIAST BP <80 MM HG: CPT | Performed by: NURSE PRACTITIONER

## 2023-11-20 PROCEDURE — 1159F MED LIST DOCD IN RCRD: CPT | Performed by: NURSE PRACTITIONER

## 2023-11-20 PROCEDURE — 1170F FXNL STATUS ASSESSED: CPT | Performed by: NURSE PRACTITIONER

## 2023-11-20 PROCEDURE — 1160F RVW MEDS BY RX/DR IN RCRD: CPT | Performed by: NURSE PRACTITIONER

## 2023-11-20 PROCEDURE — 73564 X-RAY EXAM KNEE 4 OR MORE: CPT | Performed by: INTERNAL MEDICINE

## 2023-11-20 PROCEDURE — 99213 OFFICE O/P EST LOW 20 MIN: CPT | Performed by: NURSE PRACTITIONER

## 2023-11-20 RX ORDER — SACCHAROMYCES BOULARDII 250 MG
250 CAPSULE ORAL 2 TIMES DAILY
Qty: 180 CAPSULE | Refills: 0 | Status: SHIPPED | OUTPATIENT
Start: 2023-11-20

## 2023-11-20 NOTE — TELEPHONE ENCOUNTER
Action Requested: Summary for Provider     []  Critical Lab, Recommendations Needed  [] Need Additional Advice  [x]   FYI    []   Need Orders  [] Need Medications Sent to Pharmacy  []  Other     SUMMARY: Stool Incontinence. Formed stool in past 24 hours, occasionally loose stools. Appointment today. [address]     Reason for call: Acute  Onset: past few days    Wife and patient calling office. Patient's date of birth and full name both confirmed by patient, wife present, call is on speaker phone. Can no longer control his bowel movements. Sometimes it is a formed stool, sometimes it is loose . Denies abdominal pain  Denies fever. Denies diarrhea in the past 24 hours. Denies nausea, vomiting, weakness, fever. Triaged per protocol and advised care advice per protocol. Evaluation advised Today. Appointment made. Advised to monitor symptoms. RN advised if symptoms get severely worse, patient should seek care at Emergency Room or Immediate Care. RN also informed patient to seek immediate medical attention at ER if patient experiences severe/worsening symptoms, shortness of breath, chest pain, or severe pain. Patient verbalizes understanding and is agreeable to instructions.      Future Appointments   Date Time Provider Nanda Qiu   11/20/2023  2:20 PM [facility] Randolph [facility]   11/30/2023  9:15 AM Monie Multani MD [facility]       Reason for Disposition   Nursing judgment    Protocols used: No Protocol Available - Ivette Gilbert

## 2023-11-20 NOTE — PATIENT INSTRUCTIONS
Modify your diet    Eating a diet of boiled starches and cereals (eg, potatoes, noodles, rice, wheat, and oat) with salt; crackers, bananas, soup, and boiled vegetables are options. Avoid foods with high fat content. Temporary avoidance of many lactose-containing foods may be helpful; dairy products (except yogurt) may be difficult to digest.      Follow up with GI if your symptoms continue     Take a daily probiotic. This can be purchased over the counter.

## 2023-11-20 NOTE — TELEPHONE ENCOUNTER
Wife calling to let ZAIRA Morillo know that Pt will not try to change his diet or try to exercise to help regulate his bowels. Per wife, doctor has advised Pt to lose weight in the past, and Pt did not try at all. Pt did not change his diet or tried exercising. Per wife, Pt will agree to change his diet, however, does not follow through with this.

## 2023-12-28 ENCOUNTER — OFFICE VISIT (OUTPATIENT)
Dept: ORTHOPEDICS CLINIC | Facility: CLINIC | Age: 68
End: 2023-12-28

## 2023-12-28 VITALS — HEIGHT: 76 IN | WEIGHT: 260 LBS | BODY MASS INDEX: 31.66 KG/M2

## 2023-12-28 DIAGNOSIS — M17.12 PRIMARY OSTEOARTHRITIS OF LEFT KNEE: Primary | ICD-10-CM

## 2023-12-28 DIAGNOSIS — S83.512A RUPTURE OF ANTERIOR CRUCIATE LIGAMENT OF LEFT KNEE, INITIAL ENCOUNTER: ICD-10-CM

## 2023-12-28 PROCEDURE — 1160F RVW MEDS BY RX/DR IN RCRD: CPT | Performed by: ORTHOPAEDIC SURGERY

## 2023-12-28 PROCEDURE — 3008F BODY MASS INDEX DOCD: CPT | Performed by: ORTHOPAEDIC SURGERY

## 2023-12-28 PROCEDURE — 1159F MED LIST DOCD IN RCRD: CPT | Performed by: ORTHOPAEDIC SURGERY

## 2023-12-28 PROCEDURE — 99204 OFFICE O/P NEW MOD 45 MIN: CPT | Performed by: ORTHOPAEDIC SURGERY

## 2024-03-29 DIAGNOSIS — F32.89 OTHER DEPRESSION: ICD-10-CM

## 2024-04-01 RX ORDER — ESCITALOPRAM OXALATE 10 MG/1
10 TABLET ORAL DAILY
Qty: 90 TABLET | Refills: 3 | Status: SHIPPED | OUTPATIENT
Start: 2024-04-01

## 2024-04-01 NOTE — TELEPHONE ENCOUNTER
Refill passed per [facility] protocol.  Requested Prescriptions   Pending Prescriptions Disp Refills    ESCITALOPRAM 10 MG Oral Tab [Pharmacy Med Name: Escitalopram Oxalate 10 Mg Tab Auro] 90 tablet 0     Sig: Take 1 tablet (10 mg total) by mouth daily.       Psychiatric Non-Scheduled (Anti-Anxiety) Passed - 3/29/2024 11:12 AM        Passed - In person appointment or virtual visit in the past 6 mos or appointment in next 3 mos     Recent Outpatient Visits              3 months [facility]    [facility]    4 months ago Diarrhea, unspecified type    [facility] Shannan Stanton APRN    Office Visit    5 months ago [facility]    [facility]    11 months ago [facility]counter for annual health examination    [facility]    [facility]    11 months ago Colon cancer screening    [facility], Liane Nelson APRN    Office Visit                      Passed - Depression Screening completed within the past 12 months           Recent Outpatient Visits              3 months ago Primary osteoarthritis of left knee    [facility], Ralph Urias MD    Office Visit    4 months ago Diarrhea, unspecified type    [facility], Shannan Stanton APRN    Office Visit    5 months ago [facility] hypertension    [facility]    [facility]    11 months ago Encounter for annual health examination    [facility]    [facility]    11 months ago Colon cancer screening    [facility], Liane Nelson, APRN    Office Visit

## 2024-04-23 ENCOUNTER — HOSPITAL ENCOUNTER (OUTPATIENT)
Dept: INTERVENTIONAL RADIOLOGY/VASCULAR | Facility: HOSPITAL | Age: 69
Discharge: HOME | End: 2024-04-23
Attending: INTERNAL MEDICINE | Admitting: INTERNAL MEDICINE
Payer: MEDICARE

## 2024-04-23 VITALS
SYSTOLIC BLOOD PRESSURE: 106 MMHG | TEMPERATURE: 97 F | DIASTOLIC BLOOD PRESSURE: 66 MMHG | BODY MASS INDEX: 31.33 KG/M2 | WEIGHT: 252 LBS | HEART RATE: 54 BPM | HEIGHT: 75 IN | RESPIRATION RATE: 24 BRPM | OXYGEN SATURATION: 100 %

## 2024-04-23 DIAGNOSIS — I25.10 CAD (CORONARY ARTERY DISEASE), NATIVE CORONARY ARTERY: ICD-10-CM

## 2024-04-23 LAB — ISTAT ACTIVATED CLOTTING TIME: 293 SECONDS (ref 125–137)

## 2024-04-23 PROCEDURE — 36415 COLL VENOUS BLD VENIPUNCTURE: CPT

## 2024-04-23 PROCEDURE — 93799 UNLISTED CV SVC/PROCEDURE: CPT | Performed by: INTERNAL MEDICINE

## 2024-04-23 PROCEDURE — 4A023N7 MEASUREMENT OF CARDIAC SAMPLING AND PRESSURE, LEFT HEART, PERCUTANEOUS APPROACH: ICD-10-PCS | Performed by: INTERNAL MEDICINE

## 2024-04-23 PROCEDURE — 93458 L HRT ARTERY/VENTRICLE ANGIO: CPT | Performed by: INTERNAL MEDICINE

## 2024-04-23 PROCEDURE — 99153 MOD SED SAME PHYS/QHP EA: CPT | Performed by: INTERNAL MEDICINE

## 2024-04-23 PROCEDURE — 85347 COAGULATION TIME ACTIVATED: CPT

## 2024-04-23 PROCEDURE — 4A033BC MEASUREMENT OF ARTERIAL PRESSURE, CORONARY, PERCUTANEOUS APPROACH: ICD-10-PCS | Performed by: INTERNAL MEDICINE

## 2024-04-23 PROCEDURE — B2111ZZ FLUOROSCOPY OF MULTIPLE CORONARY ARTERIES USING LOW OSMOLAR CONTRAST: ICD-10-PCS | Performed by: INTERNAL MEDICINE

## 2024-04-23 PROCEDURE — 99152 MOD SED SAME PHYS/QHP 5/>YRS: CPT | Performed by: INTERNAL MEDICINE

## 2024-04-23 RX ORDER — VERAPAMIL HYDROCHLORIDE 2.5 MG/ML
INJECTION, SOLUTION INTRAVENOUS
Status: COMPLETED
Start: 2024-04-23 | End: 2024-04-23

## 2024-04-23 RX ORDER — HEPARIN SODIUM 1000 [USP'U]/ML
INJECTION, SOLUTION INTRAVENOUS; SUBCUTANEOUS
Status: COMPLETED
Start: 2024-04-23 | End: 2024-04-23

## 2024-04-23 RX ORDER — ASPIRIN 81 MG/1
324 TABLET, CHEWABLE ORAL ONCE
Status: DISCONTINUED | OUTPATIENT
Start: 2024-04-23 | End: 2024-04-23

## 2024-04-23 RX ORDER — LIDOCAINE HYDROCHLORIDE 20 MG/ML
INJECTION, SOLUTION EPIDURAL; INFILTRATION; INTRACAUDAL; PERINEURAL
Status: COMPLETED
Start: 2024-04-23 | End: 2024-04-23

## 2024-04-23 RX ORDER — SODIUM CHLORIDE 9 MG/ML
INJECTION, SOLUTION INTRAVENOUS
Status: COMPLETED | OUTPATIENT
Start: 2024-04-23 | End: 2024-04-23

## 2024-04-23 RX ORDER — NITROGLYCERIN 20 MG/100ML
INJECTION INTRAVENOUS
Status: DISCONTINUED
Start: 2024-04-23 | End: 2024-04-23 | Stop reason: WASHOUT

## 2024-04-23 RX ORDER — MIDAZOLAM HYDROCHLORIDE 1 MG/ML
INJECTION INTRAMUSCULAR; INTRAVENOUS
Status: COMPLETED
Start: 2024-04-23 | End: 2024-04-23

## 2024-04-23 RX ORDER — ATORVASTATIN CALCIUM 40 MG/1
40 TABLET, FILM COATED ORAL NIGHTLY
Qty: 90 TABLET | Refills: 3 | Status: SHIPPED | OUTPATIENT
Start: 2024-04-23

## 2024-04-23 RX ADMIN — SODIUM CHLORIDE: 9 INJECTION, SOLUTION INTRAVENOUS at 13:30:00

## 2024-04-23 NOTE — PROCEDURES
[facility] Cardiology  Cardiac Catheterization Report    (S): Jayson Iglesias MD    PREOPERATIVE DIAGNOSIS: CAD    POSTOPERATIVE DIAGNOSIS: CAD    PROCEDURE PERFORMED: Left heart catheterization with selective coronary arteriography     CONSENT: The risks, benefits, and alternatives of cardiac catheterization were discussed with the patient.  The risks included, but were not limited to: bleeding, limb ischemia, deep venous thrombosis, allergic reaction, infection, stroke, myocardial infarction,  and death.  Benefits of the procedure included: symptomatic improvement, diagnosis of heart disease and prevention of myocardial infarction.  Alternatives to the procedure included: not performing cardiac catheterization, treatment with medications only, and observation. The patient verbalized understanding and agreed to proceed with the procedure.     CATHETERS: 6F JL 3.5, JR4; EBU 3.5    VASCULAR CLOSURE: TR Band    SEDATION: 1 mg Versed, 50 mcg Fentanyl     DESCRIPTION OF PROCEDURE:  The patient was brought to the cardiac catheterization laboratory.      Once local anesthesia was achieved, sedation was administered. The IV was maintained by the RN and moderate conscious sedation with Versed and Fentanyl was given. The patient was assessed and monitoring of oxygen, heart rate and blood pressure by nurse and myself during the exam 1438 (time of 1st dose administered when I was present) to 1516 (procedure end time).     The right radial area was prepped and draped in a sterile manner and anesthetized with 2% lidocaine.  The right radial artery was accessed, and a 6-[place] Glidesheath was placed under modified seldinger technique.  Left and right selective coronary angiography was performed using the above catheters.  A JR4 catheter was used to cross the aortic valve, measure left ventricular pressure.  At the conclusion of the study, the right radial artery hemostasis was performed using a radial band.        IFR:  Therapeutic heparin was given to maintain ACT > 250 sec. A 6F EBU 3.5 guide was used to engage the left coronary artery. A Comet IFR wire was used to wire the LAD after equalization in the proximal LM. iFR 0.94 x 2. Pullback was completed to ensure fidelity. Final angiogram showed no evidence of dissection or perforation.    FINDINGS:      HEMODYNAMICS:    The left ventricular end diastolic pressure is 6 mmHg. There was no significant gradient upon pullback across the aortic valve.     ANGIOGRAPHY:      Left main: Large vessel giving rise to the LAD and circumflex. Angiographically normal.    LAD: Moderate caliber vessel that wraps the apex and gives rise to a large diagonal artery. 50% moderate lesion of the mid LAD that is hemodynamically insignificant (iFR 0.94).    Left circumflex: Large vessel giving rise to large OM1 and OM2 vessels. Angiographically normal.    RCA: Large vessel giving rise to the PDA and RPL branches. Angiographically normal.    CONCLUSIONS:  1. Non-obstructive disease of the mid LAD (iFR 0.94).  2. Normal left sided filling pressure.    RECOMMENDATIONS:  Medical management of hemodynamically insignificant mid LAD stenosis.    MD Char Craft  4/23/2024  3:19 PM

## 2024-04-23 NOTE — DISCHARGE INSTRUCTIONS
Transradial Angiogram Discharge Instructions    The following instructions are for patient who have had an angiogram, cardiac cath, angioplasty, or stent through the radial artery.    Proper skin puncture site care is important during the healing period. This guideline should help to remind you of the verbal instructions you received from your physician or nurse.    Site: right wrist    Site Care:  For 5 days after the procedure, make sure the wrist is not submerged in water or any liquid.  Leave bandage in place for 24 hours. Then, gently wash with soap and water. Do no put any other bandage, ointment, powders, or creams to the site.  For local swelling: apply ice  If bleeding occurs, elevate the hand above the heart and apply local pressure    Activity/Driving:  Avoid wrist flexion, extension, and fine motor activities (i.e. Texting, typing, using a computer mouse, etc.) for 24 hours.  Do not lift push or pull anything heavier than 10 pounds with affected hand for 1 week.  Do not drive for 24 hours.  Do not drink alcohol for 24 hours  Do not make any critical decisions or sign any legal documents for the next 24 hours    May call answering service at [phone] for any problems or concerns     When to contact your physician  Call right away if you experience any of the following:    Swelling, pain, or bleeding at the site that is not relieved by applying ice or pressure  Signs of infection: redness, warmth, drainage at the site, chills, or temperature of 100.5 degrees or greater.  Changes in sensation, numbness, or tingling of affected hand.      When you should NOTIFY YOUR PHYSICIAN  Bleeding can occur at the procedure site - both on the outside of the skin and/or beneath the surface of the skin  Swelling or a large lump at the procedure site can occur, which may be accompanied by moderate to severe pain    If either of the above occurs, apply pressure to the procedure site with 2-3 fingers, as instructed by  the nurse.  Hold pressure for 20 minutes and the bleeding should stop.  Notify your physician of the occurrence  If the bleeding does not stop, call 911 and continue to apply pressure    If you experience signs of a fever, temperature > 101°, chills, infection (redness, swelling, thick yellow drainage, or a foul odor from the procedure site)  If you notice any numbness, tingling, or loss of feeling to your fingers or hand, if wrist access was utilized    Other  You may resume your present diet, unless otherwise specified by your physician.  You may resume all of your medications as prescribed, unless otherwise directed by your physician.  A list of your medications was provided to you at discharge.  Continue the walking program initiated in the hospital and progress your walking as directed.  Or, gradually resume your previous aerobic exercise schedule as tolerated.  Make a follow up appointment with your cardiologist. You should be seen in 7-10 days

## 2024-06-05 ENCOUNTER — OFFICE VISIT (OUTPATIENT)
Dept: INTERNAL MEDICINE CLINIC | Facility: CLINIC | Age: 69
End: 2024-06-05

## 2024-06-05 ENCOUNTER — LAB ENCOUNTER (OUTPATIENT)
Dept: LAB | Age: 69
End: 2024-06-05
Attending: INTERNAL MEDICINE
Payer: MEDICARE

## 2024-06-05 VITALS
WEIGHT: 244 LBS | DIASTOLIC BLOOD PRESSURE: 56 MMHG | RESPIRATION RATE: 18 BRPM | TEMPERATURE: 98 F | HEIGHT: 75 IN | HEART RATE: 88 BPM | BODY MASS INDEX: 30.34 KG/M2 | SYSTOLIC BLOOD PRESSURE: 98 MMHG

## 2024-06-05 DIAGNOSIS — I10 PRIMARY HYPERTENSION: ICD-10-CM

## 2024-06-05 DIAGNOSIS — K57.30 DIVERTICULA OF COLON: ICD-10-CM

## 2024-06-05 DIAGNOSIS — E78.2 MIXED HYPERLIPIDEMIA: ICD-10-CM

## 2024-06-05 DIAGNOSIS — G47.33 OSA (OBSTRUCTIVE SLEEP APNEA): ICD-10-CM

## 2024-06-05 DIAGNOSIS — Z00.00 ENCOUNTER FOR ANNUAL HEALTH EXAMINATION: Primary | ICD-10-CM

## 2024-06-05 DIAGNOSIS — G89.4 CHRONIC PAIN SYNDROME: ICD-10-CM

## 2024-06-05 DIAGNOSIS — I42.0 DILATED CARDIOMYOPATHY (HCC): ICD-10-CM

## 2024-06-05 DIAGNOSIS — I48.20 CHRONIC ATRIAL FIBRILLATION (HCC): ICD-10-CM

## 2024-06-05 DIAGNOSIS — Z12.11 COLON CANCER SCREENING: ICD-10-CM

## 2024-06-05 DIAGNOSIS — K63.5 POLYP OF COLON, UNSPECIFIED PART OF COLON, UNSPECIFIED TYPE: ICD-10-CM

## 2024-06-05 DIAGNOSIS — Z00.00 ENCOUNTER FOR ANNUAL HEALTH EXAMINATION: ICD-10-CM

## 2024-06-05 DIAGNOSIS — E66.01 MORBID OBESITY DUE TO EXCESS CALORIES (HCC): ICD-10-CM

## 2024-06-05 DIAGNOSIS — R15.9 INCONTINENCE OF FECES, UNSPECIFIED FECAL INCONTINENCE TYPE: ICD-10-CM

## 2024-06-05 DIAGNOSIS — F32.89 OTHER DEPRESSION: ICD-10-CM

## 2024-06-05 LAB
ALBUMIN SERPL-MCNC: 4.3 G/DL (ref 3.2–4.8)
ALBUMIN/GLOB SERPL: 1.2 {RATIO} (ref 1–2)
ALP LIVER SERPL-CCNC: 79 U/L
ALT SERPL-CCNC: 27 U/L
ANION GAP SERPL CALC-SCNC: 8 MMOL/L (ref 0–18)
AST SERPL-CCNC: 28 U/L (ref ?–34)
BASOPHILS # BLD AUTO: 0.04 X10(3) UL (ref 0–0.2)
BASOPHILS NFR BLD AUTO: 0.6 %
BILIRUB SERPL-MCNC: 0.9 MG/DL (ref 0.2–1.1)
BUN BLD-MCNC: 15 MG/DL (ref 9–23)
BUN/CREAT SERPL: 13.2 (ref 10–20)
CALCIUM BLD-MCNC: 9.6 MG/DL (ref 8.7–10.4)
CHLORIDE SERPL-SCNC: 110 MMOL/L (ref 98–112)
CHOLEST SERPL-MCNC: 159 MG/DL (ref ?–200)
CO2 SERPL-SCNC: 24 MMOL/L (ref 21–32)
CREAT BLD-MCNC: 1.14 MG/DL
CREAT UR-SCNC: 143.7 MG/DL
DEPRECATED RDW RBC AUTO: 46.4 FL (ref 35.1–46.3)
EGFRCR SERPLBLD CKD-EPI 2021: 69 ML/MIN/1.73M2 (ref 60–?)
EOSINOPHIL # BLD AUTO: 0.02 X10(3) UL (ref 0–0.7)
EOSINOPHIL NFR BLD AUTO: 0.3 %
ERYTHROCYTE [DISTWIDTH] IN BLOOD BY AUTOMATED COUNT: 13.2 % (ref 11–15)
FASTING PATIENT LIPID ANSWER: NO
FASTING STATUS PATIENT QL REPORTED: NO
GLOBULIN PLAS-MCNC: 3.5 G/DL (ref 2–3.5)
GLUCOSE BLD-MCNC: 87 MG/DL (ref 70–99)
HCT VFR BLD AUTO: 51.2 %
HDLC SERPL-MCNC: 37 MG/DL (ref 40–59)
HGB BLD-MCNC: 17.3 G/DL
IMM GRANULOCYTES # BLD AUTO: 0.01 X10(3) UL (ref 0–1)
IMM GRANULOCYTES NFR BLD: 0.2 %
LDLC SERPL CALC-MCNC: 101 MG/DL (ref ?–100)
LYMPHOCYTES # BLD AUTO: 1.73 X10(3) UL (ref 1–4)
LYMPHOCYTES NFR BLD AUTO: 27.9 %
MCH RBC QN AUTO: 32.3 PG (ref 26–34)
MCHC RBC AUTO-ENTMCNC: 33.8 G/DL (ref 31–37)
MCV RBC AUTO: 95.5 FL
MICROALBUMIN UR-MCNC: 1.4 MG/DL
MICROALBUMIN/CREAT 24H UR-RTO: 9.7 UG/MG (ref ?–30)
MONOCYTES # BLD AUTO: 0.68 X10(3) UL (ref 0.1–1)
MONOCYTES NFR BLD AUTO: 11 %
NEUTROPHILS # BLD AUTO: 3.72 X10 (3) UL (ref 1.5–7.7)
NEUTROPHILS # BLD AUTO: 3.72 X10(3) UL (ref 1.5–7.7)
NEUTROPHILS NFR BLD AUTO: 60 %
NONHDLC SERPL-MCNC: 122 MG/DL (ref ?–130)
OSMOLALITY SERPL CALC.SUM OF ELEC: 294 MOSM/KG (ref 275–295)
PLATELET # BLD AUTO: 165 10(3)UL (ref 150–450)
POTASSIUM SERPL-SCNC: 4.6 MMOL/L (ref 3.5–5.1)
PROT SERPL-MCNC: 7.8 G/DL (ref 5.7–8.2)
PSA SERPL-MCNC: 0.81 NG/ML (ref ?–4)
RBC # BLD AUTO: 5.36 X10(6)UL
SODIUM SERPL-SCNC: 142 MMOL/L (ref 136–145)
TRIGL SERPL-MCNC: 116 MG/DL (ref 30–149)
TSI SER-ACNC: 2.06 MIU/ML (ref 0.55–4.78)
VLDLC SERPL CALC-MCNC: 19 MG/DL (ref 0–30)
WBC # BLD AUTO: 6.2 X10(3) UL (ref 4–11)

## 2024-06-05 PROCEDURE — 85025 COMPLETE CBC W/AUTO DIFF WBC: CPT

## 2024-06-05 PROCEDURE — 84443 ASSAY THYROID STIM HORMONE: CPT

## 2024-06-05 PROCEDURE — 82570 ASSAY OF URINE CREATININE: CPT

## 2024-06-05 PROCEDURE — G0439 PPPS, SUBSEQ VISIT: HCPCS | Performed by: INTERNAL MEDICINE

## 2024-06-05 PROCEDURE — 36415 COLL VENOUS BLD VENIPUNCTURE: CPT

## 2024-06-05 PROCEDURE — 80053 COMPREHEN METABOLIC PANEL: CPT

## 2024-06-05 PROCEDURE — 99499 UNLISTED E&M SERVICE: CPT | Performed by: INTERNAL MEDICINE

## 2024-06-05 PROCEDURE — 99214 OFFICE O/P EST MOD 30 MIN: CPT | Performed by: INTERNAL MEDICINE

## 2024-06-05 PROCEDURE — 96160 PT-FOCUSED HLTH RISK ASSMT: CPT | Performed by: INTERNAL MEDICINE

## 2024-06-05 PROCEDURE — 84153 ASSAY OF PSA TOTAL: CPT

## 2024-06-05 PROCEDURE — 80061 LIPID PANEL: CPT

## 2024-06-05 PROCEDURE — 82043 UR ALBUMIN QUANTITATIVE: CPT
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
## 2024-06-05 NOTE — PATIENT INSTRUCTIONS
William Almeida's SCREENING SCHEDULE   Tests on this list are recommended by your physician but may not be covered, or covered at this frequency, by your insurer.   Please check with your insurance carrier before scheduling to verify coverage.   PREVENTATIVE SERVICES FREQUENCY &  COVERAGE DETAILS LAST COMPLETION DATE   Diabetes Screening    Fasting Blood Sugar / Glucose    One screening every 12 months if never tested or if previously tested but not diagnosed with pre-diabetes   One screening every 6 months if diagnosed with pre-diabetes Lab Results   Component Value Date     (H) 04/19/2023        Cardiovascular Disease Screening    Lipid Panel  Cholesterol  Lipoprotein (HDL)  Triglycerides Covered every 5 years for all Medicare beneficiaries without apparent signs or symptoms of cardiovascular disease Lab Results   Component Value Date    CHOLEST 145 04/19/2023    HDL 42 04/19/2023    LDL 87 04/19/2023    TRIG 80 04/19/2023         Electrocardiogram (EKG)   Covered if needed at Welcome to Medicare, and non-screening if indicated for medical reasons 11/15/2022      Ultrasound Screening for Abdominal Aortic Aneurysm (AAA) Covered once in a lifetime for one of the following risk factors   • Men who are 65-75 years old and have ever smoked   • Anyone with a family history -     Colorectal Cancer Screening  Covered for ages 50-85; only need ONE of the following:    Colonoscopy   Covered every 10 years    Covered every 2 years if patient is at high risk or previous colonoscopy was abnormal 08/08/2023    Health Maintenance   Topic Date Due   • Colorectal Cancer Screening  08/08/2028       Flexible Sigmoidoscopy   Covered every 4 years -    Fecal Occult Blood Test Covered annually -   Prostate Cancer Screening    Prostate-Specific Antigen (PSA) Annually Lab Results   Component Value Date    PSA 0.76 04/19/2023     Health Maintenance   Topic Date Due   • PSA  04/19/2025      Immunizations    Influenza Covered once per  flu season  Please get every year 09/13/2023  No recommendations at this time    Pneumococcal Each vaccine (Msciqyd69 & Harjaehkf95) covered once after 65 Prevnar 13: 10/16/2020    [id number]: -     No recommendations at this time    Hepatitis B One screening covered for patients with certain risk factors   -  No recommendations at this time    Tetanus Toxoid Not covered by Medicare Part B unless medically necessary (cut with metal); may be covered with your pharmacy prescription benefits -    Tetanus, Diptheria and Pertusis TD and TDaP Not covered by Medicare Part B -  No recommendations at this time    Zoster Not covered by Medicare Part B; may be covered with your pharmacy  prescription benefits -  No recommendations at this time     Annual Monitoring of Persistent Medications (ACE/ARB, digoxin diuretics, anticonvulsants)    Potassium Annually Lab Results   Component Value Date    K 4.7 04/19/2023         Creatinine   Annually Lab Results   Component Value Date    CREATSERUM 1.33 (H) 04/19/2023         BUN Annually Lab Results   Component Value Date    BUN 19 (H) 04/19/2023       Drug Serum Conc Annually Lab Results   Component Value Date    DIG 0.14 (L) 03/22/2021

## 2024-07-10 ENCOUNTER — TELEPHONE (OUTPATIENT)
Facility: CLINIC | Age: 69
End: 2024-07-10

## 2024-07-10 DIAGNOSIS — R19.7 DIARRHEA, UNSPECIFIED TYPE: Primary | ICD-10-CM

## 2024-07-10 NOTE — TELEPHONE ENCOUNTER
Liane,   Pt c/o episodes of stool incontinence x 8 months. Occurs every few days, approx 3x/times a week. BM usually every day otherwise, formed and non-urgent. No straining. Episodes of incontinence/urgency of r/t watery stools only. Is not taking any fiber, laxatives, or softeners.     Denies abdominal pain, N/V, fevers, blood or mucus in stool. Eating and drinking ok. No weight loss.     LOV 4/11/23. Colon done on 8/8/23, three small polyps and small IH and diverticulosis. Pt currently scheduled for clinic with you on 9/23/24. Pt saw PCP recently, no stools tests done.     Please advise if you want to keep pt as scheduled or any recs in the interim.  Thanks,  Lorraine

## 2024-07-10 NOTE — TELEPHONE ENCOUNTER
I contacted the pt.      He reports bm once daily.  Stools will be liquid for a few days and and then will be harder.  He denies straining.  He endorses urgency.  No nocturnal bm.  He denies brbpr and/or melena.  No abd pain. No bloating. No increased gas. Weight stable.     He says incontinence is 2/2 to urgency and not making it to bathroom.    He denies change in medication.    Cbc, cmp, tsh 6/5/24 normal.    Last had diarrhea this morning    Recommend:  Avoid dairy  Stool testing  X-ray  F/U in clinic for on-going symptoms  Er if condition decline    He verbalizes understanding and is in agreement with the plan

## 2024-07-21 DIAGNOSIS — I10 PRIMARY HYPERTENSION: ICD-10-CM

## 2024-07-21 DIAGNOSIS — I48.20 CHRONIC ATRIAL FIBRILLATION (HCC): ICD-10-CM

## 2024-07-21 DIAGNOSIS — I42.0 DILATED CARDIOMYOPATHY (HCC): ICD-10-CM

## 2024-07-24 RX ORDER — SPIRONOLACTONE 25 MG/1
25 TABLET ORAL DAILY
Qty: 90 TABLET | Refills: 3 | Status: SHIPPED | OUTPATIENT
Start: 2024-07-24

## 2024-07-24 NOTE — TELEPHONE ENCOUNTER
Pharmacy following up per Patient's request.  Please see note below regarding drug interaction warning.   purse/clothing/shoes

## 2024-07-24 NOTE — TELEPHONE ENCOUNTER
Refill passed per [facility] protocol.    Please review pended refill request as unable to refill due to high/very high interaction warning copied here:    Current Warnings (1)  High  Drug-Drug: spironolactone and EntrestoThe risk of hyperkalemia may be increased when potassium-sparing diuretics are co-administered with angiotensin II receptor antagonists.  Details  Override reason          Requested Prescriptions   Pending Prescriptions Disp Refills    SPIRONOLACTONE 25 MG Oral Tab [Pharmacy Med Name: Spironolactone 25 Mg Tab Nort] 90 tablet 0     Sig: Take 1 tablet (25 mg total) by mouth daily.       Hypertension Medications Protocol Passed - 7/21/2024 11:30 AM        Passed - CMP or BMP in past 12 months        Passed - Last BP reading less than 140/90     BP Readings from Last 1 Encounters:   06/05/24 98/56               Passed - In person appointment or virtual visit in the past 12 mos or appointment in next 3 mos     Recent Outpatient Visits              1 month ago Encounter for annual health examination    [facility] Moris Jose MD    Office Visit    6 months ago [facility]    [facility]    8 months ago Diarrhea, unspecified type    [facility]    Office Visit    9 months ago [facility]    [facility]    1 year ago [facility] for annual health examination    [facility]          Future Appointments         Provider Department Appt Notes    In 1 month [facility] MD [facility] 3 mth follow up    In 2 months Liane Portillo APRN [facility],  [place] incontenient bowels  informed to bring id/ins                    Passed - EGFRCR or GFRNAA > 50     GFR Evaluation  EGFRCR: 69 , resulted on 6/5/2024             Recent Outpatient Visits              [facility]    [facility]    6 months ago Primary oste[facility] of left knee    [facility]    [facility] Visit    8 months ago Diarrhea, unspecified type    [facility]    Office Visit    9 months ago Primary hypertension    [facility] Moris Donnelly MD    Office Visit    1 year ago Encounter for annual health examination    [facility]    [facility]          Future Appointments         Provider Department Appt Notes    In 1 month Moris Jose MD [facility] [place] 3 mth follow up    In 2 months [facility]  informed to bring id/ins

## 2024-08-12 ENCOUNTER — TELEPHONE (OUTPATIENT)
Facility: CLINIC | Age: 69
End: 2024-08-12

## 2024-08-22 ENCOUNTER — TELEPHONE (OUTPATIENT)
Facility: CLINIC | Age: 69
End: 2024-08-22

## 2024-08-22 NOTE — TELEPHONE ENCOUNTER
Patient called to speak with a nurse in regards to the stool kit. Patient is not sure where he is supposed to pick that kit up. Please call.

## 2024-08-27 ENCOUNTER — TELEPHONE (OUTPATIENT)
Facility: CLINIC | Age: 69
End: 2024-08-27

## 2024-09-05 ENCOUNTER — OFFICE VISIT (OUTPATIENT)
Dept: INTERNAL MEDICINE CLINIC | Facility: CLINIC | Age: 69
End: 2024-09-05

## 2024-09-05 ENCOUNTER — TELEPHONE (OUTPATIENT)
Dept: INTERNAL MEDICINE CLINIC | Facility: CLINIC | Age: 69
End: 2024-09-05

## 2024-09-05 VITALS
TEMPERATURE: 98 F | HEIGHT: 75 IN | SYSTOLIC BLOOD PRESSURE: 96 MMHG | OXYGEN SATURATION: 100 % | DIASTOLIC BLOOD PRESSURE: 50 MMHG | RESPIRATION RATE: 16 BRPM | HEART RATE: 71 BPM | BODY MASS INDEX: 29.72 KG/M2 | WEIGHT: 239 LBS

## 2024-09-05 DIAGNOSIS — I10 PRIMARY HYPERTENSION: Primary | ICD-10-CM

## 2024-09-05 DIAGNOSIS — F32.89 OTHER DEPRESSION: ICD-10-CM

## 2024-09-05 DIAGNOSIS — E66.01 MORBID OBESITY DUE TO EXCESS CALORIES (HCC): ICD-10-CM

## 2024-09-05 PROCEDURE — G2211 COMPLEX E/M VISIT ADD ON: HCPCS | Performed by: INTERNAL MEDICINE

## 2024-09-05 PROCEDURE — 99214 OFFICE O/P EST MOD 30 MIN: CPT | Performed by: INTERNAL MEDICINE

## 2024-09-05 RX ORDER — ESCITALOPRAM OXALATE 20 MG/1
20 TABLET ORAL DAILY
Qty: 90 TABLET | Refills: 3 | Status: SHIPPED | OUTPATIENT
Start: 2024-09-05

## 2024-09-05 RX ORDER — NITROGLYCERIN 0.4 MG/1
0.4 TABLET SUBLINGUAL
COMMUNITY
Start: 2024-03-20

## 2024-09-05 RX ORDER — SOTAGLIFLOZIN 200 MG/1
1 TABLET ORAL DAILY
COMMUNITY
Start: 2024-08-12

## 2024-09-05 RX ORDER — METOPROLOL TARTRATE 1 MG/ML
5 INJECTION, SOLUTION INTRAVENOUS
COMMUNITY
Start: 2024-03-20

## 2024-09-05 NOTE — TELEPHONE ENCOUNTER
Response to cancel request received from pharmacy.  Received: Today  [facility]  [facility] Ref[facility]  The pharmacy received the electronic cancel request, but could not cancel the prescription. Additional follow up tasks may be necessary based on the pharmacy response noted below.    Pharmacy Note: Prescription not found, please contact pharmacy          Pharmacy    [id number] - [place], [place] - [address] [phone], [phone]   [address] [facility] [place] [address]   Phone: [phone] Fax: [phone]   Hours: Not open 24 hours     Outpatient Medication Detail     Disp Refills Start End    escitalopram 10 MG Oral Tab (Discontinued) 90 tablet 3 4/1/2024 9/5/2024    Sig - Route: Take 1 tablet (10 mg total) by mouth daily. - Oral    Sent to pharmacy as: Escitalopram Oxalate 10 MG Oral Tablet (Lexapro)    Reason for Discontinue:  Stop This Medication    E-Prescribing Status: Receipt confirmed by pharmacy (4/1/2024 11:16 AM CDT)    E-Cancel Status: Request denied by pharmacy (9/5/2024 10:10 AM CDT)       E-Cancel Status Note: Prescription not found, please contact pharmacy      Order Providers    Prescribing Provider Encounter Provider   Moris Jose MD Gupta, Amit, MD     Medication Notes         Moris Jose MD   9/5/2024 10:10 AM  Increase to 20mg daily              Associated Diagnoses    Other depression        Encounter    View Encounter              This Order Has Been Discontinued    Order Status Reason By On   Discontinued  Stop This Medication Moris Jose MD 9/5/24 1010
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
## 2024-09-07 ENCOUNTER — APPOINTMENT (OUTPATIENT)
Dept: LAB | Facility: HOSPITAL | Age: 69
End: 2024-09-07
Attending: NURSE PRACTITIONER
Payer: MEDICARE

## 2024-09-07 PROCEDURE — 87045 FECES CULTURE AEROBIC BACT: CPT

## 2024-09-07 PROCEDURE — 87493 C DIFF AMPLIFIED PROBE: CPT

## 2024-09-07 PROCEDURE — 87046 STOOL CULTR AEROBIC BACT EA: CPT

## 2024-09-07 PROCEDURE — 87015 SPECIMEN INFECT AGNT CONCNTJ: CPT

## 2024-09-07 PROCEDURE — 87427 SHIGA-LIKE TOXIN AG IA: CPT

## 2024-09-07 PROCEDURE — 83993 ASSAY FOR CALPROTECTIN FECAL: CPT

## 2024-09-09 ENCOUNTER — LAB ENCOUNTER (OUTPATIENT)
Dept: LAB | Facility: HOSPITAL | Age: 69
End: 2024-09-09
Attending: NURSE PRACTITIONER
Payer: MEDICARE

## 2024-09-09 DIAGNOSIS — R19.7 DIARRHEA, UNSPECIFIED TYPE: ICD-10-CM

## 2024-09-09 LAB — C DIFF TOX B STL QL: NEGATIVE

## 2024-09-10 LAB — CALPROTECTIN STL-MCNT: 42.5 ΜG/G (ref ?–50)

## 2024-09-12 ENCOUNTER — HOSPITAL ENCOUNTER (OUTPATIENT)
Dept: GENERAL RADIOLOGY | Age: 69
Discharge: HOME | End: 2024-09-12
Attending: NURSE PRACTITIONER
Payer: MEDICARE

## 2024-09-12 DIAGNOSIS — R19.7 DIARRHEA, UNSPECIFIED TYPE: ICD-10-CM

## 2024-09-12 PROCEDURE — 74018 RADEX ABDOMEN 1 VIEW: CPT | Performed by: NURSE PRACTITIONER
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
## 2024-09-23 ENCOUNTER — OFFICE VISIT (OUTPATIENT)
Facility: CLINIC | Age: 69
End: 2024-09-23

## 2024-09-23 VITALS
BODY MASS INDEX: 29.22 KG/M2 | HEIGHT: 75 IN | SYSTOLIC BLOOD PRESSURE: 93 MMHG | WEIGHT: 235 LBS | DIASTOLIC BLOOD PRESSURE: 67 MMHG | HEART RATE: 52 BPM

## 2024-09-23 DIAGNOSIS — R19.7 DIARRHEA, UNSPECIFIED TYPE: ICD-10-CM

## 2024-09-23 DIAGNOSIS — Z12.11 COLON CANCER SCREENING: Primary | ICD-10-CM

## 2024-09-23 NOTE — PATIENT INSTRUCTIONS
-cln 5/2028  Bowel clean out  -fibercon or citrucel after completing bowel clean out    Clearing fecal retention with Miralax (or Dulcolax Balance) and Gatorade     1. Buy one 238 gram bottle of Miralax or Dulcolax Balance. Buy 64 oz of the Gatorade flavor of your choice.      2. Pour the contents of the MiraLAX bottle and the 64 oz of. Gatorade into a pitcher and stir. Let the \"punch\" sit for five minutes for the solution to fully dissolve. This will taste better if you drink it cold.      3. Drink 8 oz. of the \"punch\" every 20 to 30 minutes until completed. Okay to have clear liquids while drinking the laxative. Do not eat solid food while drinking the washout solution.     4. Once completed the solution, wait a few hours and then okay to have a light meal later in the day.

## 2024-09-25 ENCOUNTER — TELEPHONE (OUTPATIENT)
Facility: CLINIC | Age: 69
End: 2024-09-25

## 2024-09-25 NOTE — TELEPHONE ENCOUNTER
RN called and spoke to pt and wife. Informed them that fiber supplement can be taken daily and for long term use if needed. Discussed benefits of increasing fiber in diet, drinking enough fluids, and being active. Wife verbalized understanding and had no further needs.

## 2024-09-25 NOTE — TELEPHONE ENCOUNTER
Patient has questions regarding medications - wants to know how long he needs to continue taking medications.  Please call.  Thank you.

## 2024-10-21 ENCOUNTER — TELEPHONE (OUTPATIENT)
Facility: CLINIC | Age: 69
End: 2024-10-21

## 2024-10-21 DIAGNOSIS — R19.8 FULLNESS OF ABDOMEN: ICD-10-CM

## 2024-10-21 DIAGNOSIS — R63.4 WEIGHT LOSS: Primary | ICD-10-CM

## 2024-10-21 DIAGNOSIS — R19.7 DIARRHEA, UNSPECIFIED TYPE: ICD-10-CM

## 2024-10-21 NOTE — TELEPHONE ENCOUNTER
C-scope 8/2023  Good prep  3 polyps removed - two 8 mm polyps, one 7 mm polyp, ih, diverticulosis    Final Diagnosis:      A. Ascending colon polyp; polypectomy:  Fragment of tubular adenoma with surrounding unremarkable colonic mucosa (1.0 cm in maximum dimension).  No evidence of severe dysplasia/carcinoma in situ or infiltrating carcinoma identified.     B. Sigmoid colon polyps (2); polypectomy:  Fragment of sessile serrated adenoma and fragment of neurogenic polyp (0.8 cm in maximum dimension in aggregate).  No evidence of severe dysplasia/carcinoma in situ or infiltrating carcinoma identified.     5 y crc screening interval advised    Labs 6/5/24  Unremarkable tsh, liver, kidney function, cbc    H/o b12 def (2019). Normal (2020)    Stool testing 9/2024  Unremarkable c.diff, culture, calprotectin (giardia ordered, but not done)    KUB 9/2024  Some stool burden- predominantly right sided     Tried bowel purge     Noted ct chest 2022 -   LIMITED ABDOMEN: Small hiatal hernia with wall thickening at the gastroesophageal junction.     I attempted to contact the pt. No answer.  I lmtcb.  I reviewed notes from cards visit 10/2024  Plan to start inpefa after running out of jardiance - I suspect meds likely contributing to weight loss, diarrhea    Recommend:  Use of fiber supplement (fibercon or citrucel)  Squatty potty  Lab testing (b12, ttg iga, iga)  Stool elastase  Ct a/p  Consider egd    Await call back to discuss above

## 2024-10-21 NOTE — TELEPHONE ENCOUNTER
Liane   Pt reports on-going GI issues (variable, incontinence). Pt did clean out and is taking fiber daily but clean out was NOT effective; he noted no increase in bm after purge. Verified that he took entire 238 gm bottle with gatorade.       BM every 2-3 days. Intermittent urgency, occasional incontinence. Sometimes hard or small stools that are harder to pass. No blood in stool, abdominal pain, or fevers.        Decreased appetite x 2-3 months.  Drinks liquids daily but doesn't eat every day. States he may not eat any food for 1-2 days a week. Denies N/V/dysphagia. Current weight at home 237 lbs today.     GI symptoms worse over 1-2 years per wife and states incontinence happens often. Is wondering if his meds are contributing to GI issues, only newer med is jardiance that was started 8-9 months ago.     Please advise.  Thanks,  Lorraine

## 2024-10-22 NOTE — TELEPHONE ENCOUNTER
I contacted the pt.   He denies improvement in symptoms following clean-out.  Has started fiber supplement.  Sx preceded jardiance.   He reports that he has not started inpefa.    Recommendations below  Trial imodium prn  Rtc following testing and consider need for endoscopy    He verbalizes understanding and is in agreement with the plan.

## 2024-11-13 ENCOUNTER — LAB ENCOUNTER (OUTPATIENT)
Dept: LAB | Age: 69
End: 2024-11-13
Attending: NURSE PRACTITIONER
Payer: MEDICARE

## 2024-11-13 ENCOUNTER — HOSPITAL ENCOUNTER (OUTPATIENT)
Dept: CT IMAGING | Age: 69
Discharge: HOME | End: 2024-11-13
Attending: NURSE PRACTITIONER
Payer: MEDICARE

## 2024-11-13 DIAGNOSIS — R63.4 WEIGHT LOSS: ICD-10-CM

## 2024-11-13 DIAGNOSIS — R19.8 FULLNESS OF ABDOMEN: ICD-10-CM

## 2024-11-13 DIAGNOSIS — R19.7 DIARRHEA, UNSPECIFIED TYPE: ICD-10-CM

## 2024-11-13 LAB
CREAT BLD-MCNC: 1.2 MG/DL
EGFRCR SERPLBLD CKD-EPI 2021: 65 ML/MIN/1.73M2 (ref 60–?)
IGA SERPL-MCNC: 271.1 MG/DL (ref 40–350)
VIT B12 SERPL-MCNC: 287 PG/ML (ref 211–911)

## 2024-11-13 PROCEDURE — 74177 CT ABD & PELVIS W/CONTRAST: CPT | Performed by: NURSE PRACTITIONER

## 2024-11-13 PROCEDURE — 82784 ASSAY IGA/IGD/IGG/IGM EACH: CPT

## 2024-11-13 PROCEDURE — 36415 COLL VENOUS BLD VENIPUNCTURE: CPT

## 2024-11-13 PROCEDURE — 82565 ASSAY OF CREATININE: CPT

## 2024-11-13 PROCEDURE — 82607 VITAMIN B-12: CPT

## 2024-11-13 PROCEDURE — 86364 TISS TRNSGLTMNASE EA IG CLAS: CPT

## 2024-11-14 ENCOUNTER — TELEPHONE (OUTPATIENT)
Dept: GASTROENTEROLOGY | Facility: CLINIC | Age: 69
End: 2024-11-14

## 2024-11-14 LAB — TTG IGA SER-ACNC: 0.3 U/ML (ref ?–7)

## 2024-11-14 NOTE — TELEPHONE ENCOUNTER
Ct a/p 11/13/24  CONCLUSION:      Infrarenal AAA has significantly increased in size from July 6, 2020 comparison study.  The aneurysm now measures 4.8 x 4.9 centimeter previously 3.6 x 2.8 centimeter.  No evidence of leaking      Uncomplicated colonic diverticulosis      Subacute/chronic moderate compression fracture of L4 has developed since 2020   Finalized by (CST): Nino Cordero MD on 11/14/2024 at 8:20 AM       Given increase in size of infrarenal AAA, recommend discussion with PCP and referral to vascular surgery    Noted compression fracture- advise discussion with pcp to consider if further w/U indicated    Recommend:  Vascular surgery: Dr. Najjar T. [phone]  Er if condition decline    Lmtcb.

## 2024-11-14 NOTE — TELEPHONE ENCOUNTER
I returned the pt's call.   We discussed his ct results - does not explain sx, however needs f/U.  Plan to see vascular surgery - I recommended Dr. Najjar, but I also suggested he discuss these findings with his pcp.    Noted b12 down from comparison, however still normal.  Pancreatic elastase pending    He says diarrhea has improved and weight stable.  I advised he f/U with me after cleared from vascular standpoint and consider need for egd if on-going sx.     He verbalizes understanding and is in agreement with the plan.

## 2024-11-15 ENCOUNTER — TELEPHONE (OUTPATIENT)
Dept: INTERNAL MEDICINE CLINIC | Facility: CLINIC | Age: 69
End: 2024-11-15

## 2024-11-15 DIAGNOSIS — I71.43 INFRARENAL ABDOMINAL AORTIC ANEURYSM (AAA) WITHOUT RUPTURE (HCC): Primary | ICD-10-CM

## 2024-11-15 NOTE — TELEPHONE ENCOUNTER
1)results Pt/wife called stated received a called from Liane SHARPE, - advised pt Dr Jose received a message from her , pt stated he was not given a referral /number to call   Pt asking what Dr do you recommend - referral pending             2)pt had a home visit by Insurance Rep- she noticed his feet- discoloration,advised need an appt to be evaluated , pt stated he has poor circulation, mentioned marks on toe- appt made fro eval/assessment 11/26/24

## 2024-11-15 NOTE — TELEPHONE ENCOUNTER
Called patient in regards to message below ( identified name and date of birth )     Provided information for  Vascular  at [phone]    Patient verbalizes understanding and agrees with plan.     Referred to Provider Information:  Provider Address Phone   Najjar, Samer F, MD [address] [place] [address] [address] [place] [id number] [phone]

## 2024-11-22 ENCOUNTER — TELEPHONE (OUTPATIENT)
Facility: CLINIC | Age: 69
End: 2024-11-22

## 2024-11-22 NOTE — TELEPHONE ENCOUNTER
1st,overdue reminder letter mailed out to patient   Labs order      Pancreatic Elastase, Fecal (Order #[id number]) on 10/21/24

## 2024-11-26 ENCOUNTER — OFFICE VISIT (OUTPATIENT)
Dept: INTERNAL MEDICINE CLINIC | Facility: CLINIC | Age: 69
End: 2024-11-26

## 2024-11-26 VITALS
SYSTOLIC BLOOD PRESSURE: 98 MMHG | DIASTOLIC BLOOD PRESSURE: 64 MMHG | HEIGHT: 75 IN | BODY MASS INDEX: 29.09 KG/M2 | TEMPERATURE: 97 F | HEART RATE: 49 BPM | RESPIRATION RATE: 16 BRPM | OXYGEN SATURATION: 96 % | WEIGHT: 234 LBS

## 2024-11-26 DIAGNOSIS — B35.1 ONYCHOMYCOSIS: Primary | ICD-10-CM

## 2024-11-26 DIAGNOSIS — R20.0 NUMBNESS OF LEFT FOOT: ICD-10-CM

## 2024-11-26 DIAGNOSIS — I71.43 INFRARENAL ABDOMINAL AORTIC ANEURYSM (AAA) WITHOUT RUPTURE (HCC): ICD-10-CM

## 2024-11-26 DIAGNOSIS — M17.12 PRIMARY OSTEOARTHRITIS OF LEFT KNEE: ICD-10-CM

## 2024-11-26 PROCEDURE — 99214 OFFICE O/P EST MOD 30 MIN: CPT | Performed by: INTERNAL MEDICINE

## 2024-11-26 PROCEDURE — 1126F AMNT PAIN NOTED NONE PRSNT: CPT | Performed by: INTERNAL MEDICINE

## 2024-11-26 PROCEDURE — 1159F MED LIST DOCD IN RCRD: CPT | Performed by: INTERNAL MEDICINE

## 2024-11-26 PROCEDURE — 1160F RVW MEDS BY RX/DR IN RCRD: CPT | Performed by: INTERNAL MEDICINE

## 2024-11-26 PROCEDURE — G2211 COMPLEX E/M VISIT ADD ON: HCPCS | Performed by: INTERNAL MEDICINE

## 2024-11-26 RX ORDER — TERBINAFINE HYDROCHLORIDE 250 MG/1
250 TABLET ORAL DAILY
Qty: 90 TABLET | Refills: 0 | Status: SHIPPED | OUTPATIENT
Start: 2024-11-26

## 2024-11-26 RX ORDER — EZETIMIBE 10 MG/1
10 TABLET ORAL DAILY
COMMUNITY
Start: 2024-10-21
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
## 2024-12-03 ENCOUNTER — OFFICE VISIT (OUTPATIENT)
Facility: CLINIC | Age: 69
End: 2024-12-03

## 2024-12-03 VITALS — HEIGHT: 75 IN | WEIGHT: 234 LBS | BODY MASS INDEX: 29.09 KG/M2

## 2024-12-03 DIAGNOSIS — I71.43 INFRARENAL ABDOMINAL AORTIC ANEURYSM (AAA) WITHOUT RUPTURE (HCC): Primary | ICD-10-CM
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
## 2024-12-13 ENCOUNTER — TELEPHONE (OUTPATIENT)
Facility: CLINIC | Age: 69
End: 2024-12-13

## 2024-12-13 NOTE — TELEPHONE ENCOUNTER
I spoke to the patient and his wife    Patient is not experiencing any new issues at this time    Patient states he is still having constipation with incontinence/urgency that he has had since last office visit on 9/23/2024    Patient was seen on 12/3/2024 by Dr. Buenrostro (vascular) to follow up on his infrarenal AAA     Per TE on 11/14/2024, patient advised to follow up after vascular appointment/clearance    Patient scheduled for office visit on 1/6/2025 at 11:00 am    Location, date and time verified with the patient    Patient verbalized understanding and has no further questions at this time    Your appointments       Date & Time Appointment Department (Center)    Jan 06, 2025 11:00 AM CST [facility]

## 2025-01-07 ENCOUNTER — OFFICE VISIT (OUTPATIENT)
Facility: CLINIC | Age: 70
End: 2025-01-07

## 2025-01-07 ENCOUNTER — TELEPHONE (OUTPATIENT)
Dept: GASTROENTEROLOGY | Facility: CLINIC | Age: 70
End: 2025-01-07

## 2025-01-07 ENCOUNTER — TELEPHONE (OUTPATIENT)
Facility: CLINIC | Age: 70
End: 2025-01-07

## 2025-01-07 VITALS
BODY MASS INDEX: 28.6 KG/M2 | HEART RATE: 96 BPM | DIASTOLIC BLOOD PRESSURE: 84 MMHG | HEIGHT: 75 IN | WEIGHT: 230 LBS | SYSTOLIC BLOOD PRESSURE: 114 MMHG

## 2025-01-07 DIAGNOSIS — R63.4 WEIGHT LOSS: ICD-10-CM

## 2025-01-07 DIAGNOSIS — R19.7 DIARRHEA, UNSPECIFIED TYPE: Primary | ICD-10-CM

## 2025-01-07 PROCEDURE — 1159F MED LIST DOCD IN RCRD: CPT | Performed by: NURSE PRACTITIONER

## 2025-01-07 PROCEDURE — 1160F RVW MEDS BY RX/DR IN RCRD: CPT | Performed by: NURSE PRACTITIONER

## 2025-01-07 PROCEDURE — 99215 OFFICE O/P EST HI 40 MIN: CPT | Performed by: NURSE PRACTITIONER

## 2025-01-07 NOTE — TELEPHONE ENCOUNTER
Nursing:    Please get clearance for pt to hold eliquis 48h prior to procedure from Dr. Iglesias.    Thanks,  JANA Iglesias, Jayson Dickerson MD   [address]    [address]   [place] [address] [phone] (Work)   [phone] (Fax)

## 2025-01-07 NOTE — TELEPHONE ENCOUNTER
Scheduled for:  EGD 45999  Provider Name: Dr. Meza   Date: 4/8/2025    Location:  [facility]  Sedation:  MAC  Time: 11:30 am (pt is aware that [facility]  will call the day before to confirm arrival time)    Prep:  NPO after Midnight  Meds/Allergies Reconciled?:   ZAIRA Deshpande  Diagnosis with codes:    Diarrhea R19.7  Weight loss R63.4  Was patient informed to call insurance with codes (Y/N):  Yes  Referral sent?:  Referral was sent at the time of electronic surgical scheduling.  EM or [facility] notified?:  I sent an electronic request to Endo Scheduling and received a confirmation today.  Medication Orders:     Hold jardiance 4 days prior to procedure  Contact Dr. Iglesias for clearance to hold eliquis 48h prior to procedure     Pt is aware to NOT take iron pills, herbal meds and diet supplements for 7 days before exam. Also to NOT take any form of alcohol, recreational drugs and any forms of ED meds 24 hours before exam.   Misc Orders:  Patient was informed that they will need a COVID 19 test prior to their procedure. Patient verbally understood & will await a phone call from [facility] to schedule.       Further instructions given by staff:  I provide prep instructions to patient at the time of the appointment and reviewed date, time and location, he verbalized that he understood and is aware to call if he has any questions.

## 2025-01-07 NOTE — PATIENT INSTRUCTIONS
-stool for pancreatic elastase  -imodium as needed  -avoid dairy, wheat    Egd w/ mac w/ Dr Meza  Dx: diarrhea, Weight loss    Hold jardiance 4 days prior to procedure  Contact Dr. Iglesias for clearance to hold eliquis 48h prior to procedure
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
## 2025-01-09 NOTE — TELEPHONE ENCOUNTER
Clearance for patient to hold Eliquis for 48 hours prior to procedure on 4/8/2025 faxed to Dr. Iglesias's office at [phone]    Confirmation fax received

## 2025-03-14 ENCOUNTER — TELEPHONE (OUTPATIENT)
Dept: INTERNAL MEDICINE CLINIC | Facility: CLINIC | Age: 70
End: 2025-03-14

## 2025-04-08 ENCOUNTER — HOSPITAL ENCOUNTER (OUTPATIENT)
Facility: HOSPITAL | Age: 70
Setting detail: HOSPITAL OUTPATIENT SURGERY
Discharge: HOME | End: 2025-04-08
Attending: INTERNAL MEDICINE | Admitting: INTERNAL MEDICINE
Payer: MEDICARE

## 2025-04-08 ENCOUNTER — ANESTHESIA (OUTPATIENT)
Dept: ENDOSCOPY | Facility: HOSPITAL | Age: 70
End: 2025-04-08
Payer: MEDICARE

## 2025-04-08 ENCOUNTER — ANESTHESIA EVENT (OUTPATIENT)
Dept: ENDOSCOPY | Facility: HOSPITAL | Age: 70
End: 2025-04-08
Payer: MEDICARE

## 2025-04-08 VITALS
HEIGHT: 76 IN | SYSTOLIC BLOOD PRESSURE: 108 MMHG | RESPIRATION RATE: 21 BRPM | BODY MASS INDEX: 28.01 KG/M2 | WEIGHT: 230 LBS | HEART RATE: 64 BPM | DIASTOLIC BLOOD PRESSURE: 81 MMHG | OXYGEN SATURATION: 99 %

## 2025-04-08 DIAGNOSIS — R19.7 DIARRHEA, UNSPECIFIED TYPE: ICD-10-CM

## 2025-04-08 DIAGNOSIS — R63.4 WEIGHT LOSS: ICD-10-CM

## 2025-04-08 PROBLEM — K31.9 GASTRIC ERYTHEMA: Status: ACTIVE | Noted: 2025-04-08

## 2025-04-08 PROCEDURE — 88342 IMHCHEM/IMCYTCHM 1ST ANTB: CPT | Performed by: INTERNAL MEDICINE

## 2025-04-08 PROCEDURE — 88305 TISSUE EXAM BY PATHOLOGIST: CPT | Performed by: INTERNAL MEDICINE

## 2025-04-08 PROCEDURE — 88312 SPECIAL STAINS GROUP 1: CPT | Performed by: INTERNAL MEDICINE

## 2025-04-08 PROCEDURE — 0DB78ZX EXCISION OF STOMACH, PYLORUS, VIA NATURAL OR ARTIFICIAL OPENING ENDOSCOPIC, DIAGNOSTIC: ICD-10-PCS | Performed by: INTERNAL MEDICINE

## 2025-04-08 PROCEDURE — 0DB98ZX EXCISION OF DUODENUM, VIA NATURAL OR ARTIFICIAL OPENING ENDOSCOPIC, DIAGNOSTIC: ICD-10-PCS | Performed by: INTERNAL MEDICINE

## 2025-04-08 RX ORDER — SODIUM CHLORIDE, SODIUM LACTATE, POTASSIUM CHLORIDE, CALCIUM CHLORIDE 600; 310; 30; 20 MG/100ML; MG/100ML; MG/100ML; MG/100ML
INJECTION, SOLUTION INTRAVENOUS CONTINUOUS
Status: DISCONTINUED | OUTPATIENT
Start: 2025-04-08 | End: 2025-04-08

## 2025-04-08 RX ORDER — NALOXONE HYDROCHLORIDE 0.4 MG/ML
0.08 INJECTION, SOLUTION INTRAMUSCULAR; INTRAVENOUS; SUBCUTANEOUS ONCE AS NEEDED
Status: DISCONTINUED | OUTPATIENT
Start: 2025-04-08 | End: 2025-04-08

## 2025-04-08 RX ORDER — LIDOCAINE HYDROCHLORIDE 10 MG/ML
INJECTION, SOLUTION EPIDURAL; INFILTRATION; INTRACAUDAL; PERINEURAL AS NEEDED
Status: DISCONTINUED | OUTPATIENT
Start: 2025-04-08 | End: 2025-04-08 | Stop reason: SURG

## 2025-04-08 RX ADMIN — SODIUM CHLORIDE, SODIUM LACTATE, POTASSIUM CHLORIDE, CALCIUM CHLORIDE: 600; 310; 30; 20 INJECTION, SOLUTION INTRAVENOUS at 09:31:00

## 2025-04-08 RX ADMIN — LIDOCAINE HYDROCHLORIDE 15 MG: 10 INJECTION, SOLUTION EPIDURAL; INFILTRATION; INTRACAUDAL; PERINEURAL at 09:35:00

## 2025-04-08 NOTE — OPERATIVE REPORT
ESOPHAGOGASTRODUODENOSCOPY (EGD) REPORT    William Almeida     3/22/1955 Age 70 year old   PCP Moris Jose MD Endoscopist Patricia Meza MD     Date of procedure: 25    Procedure: EGD w/cold biopsy    Pre-operative diagnosis: Poor appetite, weight loss, diarrhea    Post-operative diagnosis: Gastric erythema    Medications: MAC    Complications: none    Procedure:  Informed consent was obtained from the patient after the risks of the procedure were discussed, including but not limited to bleeding, perforation, aspiration, infection, or possibility of a missed lesion. After discussions of the risks/benefits and alternatives to this procedure, as well as the planned sedation, the patient was placed in the left lateral decubitus position and begun on continuous blood pressure pulse oximetry and EKG monitoring and this was maintained throughout the procedure. Once an adequate level of sedation was obtained a bite block was placed. Then the lubricated tip of the [id number] diagnostic video upper endoscope was inserted and advanced using direct visualization into the posterior pharynx and ultimately into the esophagus.    Complications: None    Findings:      1. Esophagus: The squamocolumnar junction was noted at 40 cm and appeared regular. The diaphragmatic pinch was noted noted at 40 cm from the incisors. The esophageal mucosa appeared normal. There was no endoscopic findings of esophagitis, stricture or Sanchez's esophagus.    2. Stomach: The stomach distended normally. Normal rugal folds were seen. The pylorus was patent. The gastric mucosa appeared mildly erythematous s/p random gastric biopsies. Retroflexion revealed a normal fundus and a non-patulous cardia.     3. Duodenum: The duodenal mucosa appeared normal in the 1st and 2nd portion of the duodenum. S/p random biopsies.    Impression:  1. Mild gastric erythema.  2. No mass or ulcer noted.    Recommend:  1. Await pathology.  2. Trial of  imodium 1-2x a day to see if that helps reduce diarrhea.   3. F/u with your physician re: aortic aneurysm.  4. Resume blood thinner tomorrow.    >>>If tissue was sampled/removed and you have not received your pathology results either by phone or letter within 2 weeks, please call our office at [phone].    Specimens: gastric, duodenal  Blood loss: <1 ml

## 2025-04-08 NOTE — DISCHARGE INSTRUCTIONS
Home Care Instructions for Gastroscopy with Sedation    Diet:  - Resume your regular diet as tolerated unless otherwise instructed.  - Start with light meals to minimize bloating.  - Do not drink alcohol today.    Medication:  - If you have questions about resuming your normal medications, please contact your Primary Care Physician.    Activities:  - Take it easy today. Do not return to work today.  - Do not drive today.  - Do not operate any machinery today (including kitchen equipment).    Gastroscopy:  - You may have a sore throat for 2-3 days following the exam. This is normal. Gargling with warm salt water (1/2 tsp salt to 1 glass warm water) or using throat lozenges will help.  - If you experience any sharp pain in your neck, abdomen or chest, vomiting of blood, oral temperature over 100 degrees Fahrenheit, light-headedness or dizziness, or any other problems, contact your doctor.    **If unable to reach your doctor, please go to the [facility] Emergency Room**    - Your referring physician will receive a full report of your examination.  - If you do not hear from your doctor's office within two weeks of your biopsy, please call them for your results.    You may be able to see your laboratory results in [employer] between 4 and 7 business days.  In some cases, your physician may not have viewed the results before they are released to [employer].  If you have questions regarding your results contact the physician who ordered the test/exam by phone or via [employer] by choosing \"Ask a Medical Question.\"

## 2025-04-08 NOTE — ANESTHESIA POSTPROCEDURE EVALUATION
Patient: William Almeida    Procedure Summary       Date: 04/08/25 Room / Location: [facility] ENDOSCOPY 03 / [facility] ENDOSCOPY    Anesthesia Start: 0931 Anesthesia Stop: 0945    Procedure: ESOPHAGOGASTRODUODENOSCOPY Diagnosis:       Diarrhea, unspecified type      Weight loss      (Gastric erythema)    Surgeons: MARY Meza MD Anesthesiologist: Reese Jacobson CRNA    Anesthesia Type: MAC ASA Status: 3            Anesthesia Type: MAC    Vitals Value Taken Time   /127 04/08/25 0945   Temp 98 04/08/25 0949   Pulse 65 04/08/25 0948   Resp 27 04/08/25 0948   SpO2 100 % 04/08/25 0948   Vitals shown include unfiled device data.    [facility] AN Post Evaluation:   Patient Evaluated in PACU  Patient Participation: complete - patient cannot participate  Level of Consciousness: awake  Pain Management: adequateYes    Nausea/Vomiting: none  Cardiovascular Status: acceptable and blood pressure returned to baseline  Respiratory Status: acceptable and nasal cannula  Postoperative Hydration acceptable      Reese Jacobson CRNA  4/8/2025 9:49 AM
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
## 2025-04-29 ENCOUNTER — TELEPHONE (OUTPATIENT)
Facility: CLINIC | Age: 70
End: 2025-04-29

## 2025-04-29 ENCOUNTER — TELEPHONE (OUTPATIENT)
Dept: INTERNAL MEDICINE CLINIC | Facility: CLINIC | Age: 70
End: 2025-04-29

## 2025-04-29 NOTE — TELEPHONE ENCOUNTER
Last EGD done 4/8/25. 1 year recall placed into Pt Outreach, next due on 04/2026 per Dr. Meza. Specialty comments updated.

## 2025-05-20 DIAGNOSIS — I10 PRIMARY HYPERTENSION: ICD-10-CM

## 2025-05-20 DIAGNOSIS — I42.0 DILATED CARDIOMYOPATHY (HCC): ICD-10-CM

## 2025-05-20 DIAGNOSIS — I48.20 CHRONIC ATRIAL FIBRILLATION (HCC): ICD-10-CM

## 2025-05-21 RX ORDER — SPIRONOLACTONE 25 MG/1
25 TABLET ORAL DAILY
Qty: 90 TABLET | Refills: 3 | Status: SHIPPED | OUTPATIENT
Start: 2025-05-21

## 2025-05-21 NOTE — TELEPHONE ENCOUNTER
Refill Per Protocol     Requested Prescriptions   Pending Prescriptions Disp Refills    SPIRONOLACTONE 25 MG Oral Tab [Pharmacy Med Name: Spironolactone 25 Mg Tab Nort] 90 tablet 0     Sig: Take 1 tablet (25 mg total) by mouth daily.       Hypertension Medications Protocol Passed - 5/21/2025  3:22 PM        Passed - CMP or BMP in past 12 months        Passed - Last BP reading less than 140/90     BP Readings from Last 1 Encounters:   04/08/25 108/81               Passed - In person appointment or virtual visit in the past 12 mos or appointment in next 3 mos     Recent Outpatient Visits              [facility], [facility]    Office Visit    5 months ago Infrarenal abdominal aortic aneurysm (AAA) without rupture    [facility] [place] Najjar, Samer F, MD    Office Visit    5 months [facility]    [facility]    8 months ago [facility]    Office Visit    8 months ago [facility]    [facility]          Future Appointments         Provider Department Appt Notes    In 6 days [facility]illo [facility] issues.                    Passed - EGFRCR or GFRNAA > 50     GFR Evaluation  EGFRCR: 65 , resulted on 11/13/2024          Passed - Medication is active on med list

## 2025-05-21 NOTE — TELEPHONE ENCOUNTER
Please review pended refill request as unable to refill due to high/very high interaction warning copied here:    Very High  Drug-Drug: spironolactone and EntrestoAngiotensin II receptor blockers, such as Angiotensin II Receptor Antagonists, may enhance the hyperkalemic effect of potassium-sparing diuretics (eg, Potassium-Sparing Diuretics).  Details

## 2025-05-27 ENCOUNTER — TELEPHONE (OUTPATIENT)
Facility: CLINIC | Age: 70
End: 2025-05-27

## 2025-05-30 ENCOUNTER — TELEPHONE (OUTPATIENT)
Facility: CLINIC | Age: 70
End: 2025-05-30

## 2025-05-30 NOTE — TELEPHONE ENCOUNTER
JAGRUTI LYONS FOR PT TO SCHEDULE CTA ABD PELV, ordered by Dr. Najjar 12/2024. Patient is due in June 2025. Informed pt in message to call [phone].
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
## 2025-06-23 ENCOUNTER — OFFICE VISIT (OUTPATIENT)
Facility: CLINIC | Age: 70
End: 2025-06-23

## 2025-06-23 VITALS
HEART RATE: 79 BPM | WEIGHT: 220.31 LBS | SYSTOLIC BLOOD PRESSURE: 92 MMHG | BODY MASS INDEX: 26.83 KG/M2 | DIASTOLIC BLOOD PRESSURE: 55 MMHG | HEIGHT: 76 IN

## 2025-06-23 DIAGNOSIS — K31.A0 INTESTINAL METAPLASIA OF GASTRIC MUCOSA: ICD-10-CM

## 2025-06-23 DIAGNOSIS — R15.2 FECAL URGENCY: Primary | ICD-10-CM

## 2025-06-23 PROCEDURE — 99215 OFFICE O/P EST HI 40 MIN: CPT | Performed by: NURSE PRACTITIONER

## 2025-06-23 PROCEDURE — 1160F RVW MEDS BY RX/DR IN RCRD: CPT | Performed by: NURSE PRACTITIONER

## 2025-06-23 PROCEDURE — 1159F MED LIST DOCD IN RCRD: CPT | Performed by: NURSE PRACTITIONER

## 2025-06-23 NOTE — PATIENT INSTRUCTIONS
-egd 4/2026  -c-scope 2028  -avoid/limit fatty greasy food, dairy, limit caffeine  -trial fiber supplement  -imodium as needed  -consider trial of colestipol  -consider anorectal motility/trial of pfpt  -2-3 L of water  -30 g of fiber  -30 min of physical activity

## (undated) DEVICE — KIT CLEAN ENDOKIT 1.1OZ GOWNX2

## (undated) DEVICE — KIT ENDO ORCAPOD 160/180/190

## (undated) DEVICE — CONMED SCOPE SAVER BITE BLOCK, 20X27 MM: Brand: SCOPE SAVER

## (undated) DEVICE — GIJAW SINGLE-USE BIOPSY FORCEPS WITH NEEDLE: Brand: GIJAW

## (undated) DEVICE — YANKAUER,BULB TIP,W/O VENT,RIGID,STERILE: Brand: MEDLINE

## (undated) DEVICE — Device: Brand: DUAL NARE NASAL CANNULAE FEMALE LUER CON 7FT O2 TUBE

## (undated) DEVICE — 60 ML SYRINGE REGULAR TIP: Brand: MONOJECT

## (undated) DEVICE — MEDI-VAC NON-CONDUCTIVE SUCTION TUBING 6MM X 1.8M (6FT.) L: Brand: CARDINAL HEALTH

## (undated) DEVICE — MEDI-VAC NON-CONDUCTIVE SUCTION TUBING: Brand: CARDINAL HEALTH

## (undated) DEVICE — Device

## (undated) DEVICE — SNARE ENDOSCOPIC 10MM ROUND

## (undated) DEVICE — V2 SPECIMEN COLLECTION MANIFOLD KIT: Brand: NEPTUNE

## (undated) DEVICE — CLIP LGT 11MM OPEN 2.8MM 235CM

## (undated) NOTE — IP AVS SNAPSHOT
Patient Demographics     Address  [address] Phone  [phone] [facility])  [phone] (Mobile) *Preferred* E-mail Address  [email or web address]      Emergency Contact(s)     Name Relation Home Work [address] Next dose due: Tomorrow 9 am      Take 81 mg by mouth daily. lisinopril 20 MG Tabs  Next dose due: Tomorrow 9 am      Take 20 mg by mouth daily.  HCTZ 25mg          predniSONE 20 MG Tabs  Commonly known as:  Alexander Young  Start taking on:  July 14, Temp  97.5 °F (36.4 °C) Filed at 07/13/2020 0951   SpO2  97 % Filed at 07/13/2020 0951      Patient's Most Recent Weight       Most Recent Value   Patient Weight  125.8 kg (277 lb 6.4 oz)      CPAP Settings (Inpatient)       Most Recent Value   Mode  AutoP Body Fluid Culture Result No Growth 5 Days     Body Fluid Smear 1+ WBCs seen      No organisms seen    Rapid SARS-CoV-2 by PCR STAT [[id number]]  (Normal) Collected:  07/02/20 1421    Order Status:  Completed Lab Status:  Final result Updated:  07/02/20 1 PAST MEDICAL HISTORY:  Per the record, the patient had possible cardiomyopathy. Echocardiogram 2018, showed ejection fraction of 30%. No further workup is seen in Epic. He has chronic atrial fibrillation and anticoagulated with Coumadin.   Generalized os intercostal retractions. HEART:  Regular rate and rhythm. S1, S2 auscultated. No murmur. ABDOMEN:  Soft. Nondistended. No tenderness. Positive bowel sounds. EXTREMITIES:  No edema, clubbing or cyanosis. Stasis both lower extremities.    Bradford Clan :  3/22/1955  Admit Date:  2020  Attending Provider:  Roosvelt Habermann, MD                                  Primary Care Physician:  Martha Jaimes MD   Admitting Diagnosis: Myopathy [G72.9]  Inability to ambulate due to knee [R26.2]  Chronic microvascular change of the basal ganglia bilaterally. No evidence for acute infarct. 2. Left maxillary sinus disease.     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   Head:  Normocephalic, without obvious Please consider EMG/muscle biopsy/LP/serology testing for Lyme's/West Nile etc.         Based on patient's current functional status, pt would benefit from Acute Rehabilitation, working with PT/OT/SLP to upgrade present functional status, provide family tr Inability to ambulate due to knee [R26.2]  Chronic pain of left knee [M25.562, G89.29]  Internal derangement of right shoulder [M24.811]    Hospital Discharge Diagnoses: Weakness    Hospital Discharge Diagnoses: Weakness    Lace+ Score: 82  59-90 High Risk The patient is a 50-year-old  male who came in to the emergency department for evaluation of progressive knee pain and inability to bear weight or walk because of left knee pain. CBC and chemistry unremarkable. INR still pending.   White blood ce HL  - atorvastatin 10mg HS  - monitor     HTN  - Q shift vitals  - Lisinopril 20mg daily  - monitor       Discharge Condition: Stable    Discharge Medications:      Discharge Medications      START taking these medications      Instructions Prescription de Greater than 30 minutes spent on preparation and coordination of this discharge[LD.1]    Electronically signed by Edith Smith MD on 7/13/2020  3:50 PM   Attribution Vincent    LD. 1 - Edith Smith MD on 7/13/2020  3:44 PM  LD. 2 - Edith Smith MD on adjustments to AD . Pt remains with decrease overall balance and LE coordination noted.      Pt education was provided for therapy POC / d/c planning with pt / fxn mobility training focus safety and proper sequencing/ fall risk prevention / ankle pumps / pa conservation;Patient education; Family education;Gait training;Balance training;Transfer training;Neuromuscular re-educate;Stair training    SUBJECTIVE  Pt reports a goal for rehab facility, as feels needs to regain strength and endurance from this admissio -   Moving to and from a bed to a chair (including a wheelchair)?: A Little   -   Need to walk in hospital room?: A Little   -   Climbing 3-5 steps with a railing?: A Little     AM-PAC Score:  Raw Score: 18   Approx Degree of Impairment: 46.58%   Standardi 1:35 PM  Version 1 of 1    Author:  Thelma Law OT Service:  Rehab Author Type:  Occupational Therapist    Filed:  7/13/2020  1:35 PM Date of Service:  7/13/2020  1:28 PM Status:  Signed    :  Thelma Law OT (Occupational Therapist) OT Treatment Plan: Balance activities; Energy conservation/work simplification techniques;ADL training;IADL training;Functional transfer training; Endurance training;Patient/Family education;Patient/Family training    SUBJECTIVE  \"I'm ready to get out of he Upper Extremity Dressing: SBA  Lower Extremity Dressing: SBA    Education Provided: Pt educated on OT role and plan of care; rehab expectations  Patient End of Session: Up in chair;Needs met;Call light within reach;RN aware of session/findings; All patient

## (undated) NOTE — LETTER
[facility]  [address] [address]  PH: [phone]  FAX: [phone]        25  William Almeida, :  3/22/1955  [facility] [place] [address]    Hello,      This is the [facility] office of Dr. Moris Jose     Thank you for putting your trust in [facility].  Our goal is to deliver the highest quality healthcare and an exceptional patient experience. Upon reviewing of your medical record shows you are due for the following:       Annual physical                Please call [phone] to schedule your appointment or schedule online via [employer].     If you changed to a new provider at another facility, please notify the clinic to update your records.     If you had any recent testing at another facility, please have your results faxed to our office at [phone].      Thank you and have a great day!

## (undated) NOTE — LETTER
[facility]  Administration of Anesthesia  I, William Almeida agree to be cared for by a physician anesthesiologist alone and/or with a nurse anesthetist, who is specially trained to monitor me and give me medicine to put me to sleep or keep me comfortable during my procedure    I understand that my anesthesiologist and/or anesthetist is not an employee or agent of [facility] or [employer]. He or she works for [facility]    As the patient asking for anesthesia services, I agree to:  Allow the anesthesiologist (anesthesia doctor) to give me medicine and do additional procedures as necessary. Some examples are: Starting or using an “IV” to give me medicine, fluids or blood during my procedure, and having a breathing tube placed to help me breathe when I’m asleep (intubation). In the event that my heart stops working properly, I understand that my anesthesiologist will make every effort to sustain my life, unless otherwise directed by [facility] Do Not Resuscitate documents.  Tell my anesthesia doctor before my procedure:  If I am pregnant.  The last time that I ate or drank.  iii. All of the medicines I take (including prescriptions, herbal supplements, and pills I can buy without a prescription (including street drugs/illegal medications). Failure to inform my anesthesiologist about these medicines may increase my risk of anesthetic complications.  iv.If I am allergic to anything or have had a reaction to anesthesia before.  I understand how the anesthesia medicine will help me (benefits).  I understand that with any type of anesthesia medicine there are risks:  The most common risks are: nausea, vomiting, sore throat, muscle soreness, damage to my eyes, mouth, or teeth (from breathing tube placement).  Rare risks include: remembering what happened during my procedure, allergic reactions to medications, injury to my airway, heart, lungs, vision, nerves, or  muscles and in extremely rare instances death.  My doctor has explained to me other choices available to me for my care (alternatives).  Pregnant Patients (“epidural”):  I understand that the risks of having an epidural (medicine given into my back to help control pain during labor), include itching, low blood pressure, difficulty urinating, headache or slowing of the baby’s heart. Very rare risks include infection, bleeding, seizure, irregular heart rhythms and nerve injury.  Regional Anesthesia (“spinal”, “epidural”, & “nerve blocks”):  I understand that rare but potential complications include headache, bleeding, infection, seizure, irregular heart rhythms, and nerve injury.    _____________________________________________________________________________  Patient (or Representative) Signature/Relationship to Patient  Date   Time    _____________________________________________________________________________   Name (if used)    Language/Organization   Time    _____________________________________________________________________________  Nurse Anesthetist Signature     Date   Time  _____________________________________________________________________________  Anesthesiologist Signature     Date   Time  I have discussed the procedure and information above with the patient (or patient’s representative) and answered their questions. The patient or their representative has agreed to have anesthesia services.    _____________________________________________________________________________  Witness        Date   Time  I have verified that the signature is that of the patient or patient’s representative, and that it was signed before the procedure  Patient Name: William Almeida     : 3/22/1955                 Printed: 4/3/2025 at 3:51 PM    Medical Record #: [id number]                                            Page 1 of 1  ----------ANESTHESIA CONSENT----------

## (undated) NOTE — LETTER
[facility]  [address], [place], [place]    Authorization for Surgical Operation and Procedure                               I hereby authorize MARY Meza MD, my physician and his/her assistants (if applicable), which may include medical students, residents, and/or fellows, to perform the following surgical operation/ procedure and administer such anesthesia as may be determined necessary by my physician: Operation/Procedure name (s) ESOPHAGOGASTRODUODENOSCOPY on William Almeida   2.   I recognize that during the surgical operation/procedure, unforeseen conditions may necessitate additional or different procedures than those listed above.  I, therefore, further authorize and request that the above-named surgeon, assistants, or designees perform such procedures as are, in their judgment, necessary and desirable.    3.   My surgeon/physician has discussed prior to my surgery the potential benefits, risks and side effects of this procedure; the likelihood of achieving goals; and potential problems that might occur during recuperation.  They also discussed reasonable alternatives to the procedure, including risks, benefits, and side effects related to the alternatives and risks related to not receiving this procedure.  I have had all my questions answered and I acknowledge that no guarantee has been made as to the result that may be obtained.    4.   Should the need arise during my operation/procedure, which includes change of level of care prior to discharge, I also consent to the administration of blood and/or blood products.  Further, I understand that despite careful testing and screening of blood or blood products by collecting agencies, I may still be subject to ill effects as a result of receiving a blood transfusion and/or blood products.  The following are some, but not all, of the potential risks that can occur: fever and allergic reactions, hemolytic reactions, transmission of  diseases such as Hepatitis, AIDS and Cytomegalovirus (CMV) and fluid overload.  In the event that I wish to have an autologous transfusion of my own blood, or a directed donor transfusion, I will discuss this with my physician.  Check only if Refusing Blood or Blood Products  I understand refusal of blood or blood products as deemed necessary by my physician may have serious consequences to my condition to include possible death. I hereby assume responsibility for my refusal and release the hospital, its personnel, and my physicians from any responsibility for the consequences of my refusal.    o  Refuse   5.   I authorize the use of any specimen, organs, tissues, body parts or foreign objects that may be removed from my body during the operation/procedure for diagnosis, research or teaching purposes and their subsequent disposal by hospital authorities.  I also authorize the release of specimen test results and/or written reports to my treating physician on the hospital medical staff or other referring or consulting physicians involved in my care, at the discretion of the Pathologist or my treating physician.    6.   I consent to the photographing or videotaping of the operations or procedures to be performed, including appropriate portions of my body for medical, scientific, or educational purposes, provided my identity is not revealed by the pictures or by descriptive texts accompanying them.  If the procedure has been photographed/videotaped, the surgeon will obtain the original picture, image, videotape or CD.  The hospital will not be responsible for storage, release or maintenance of the picture, image, tape or CD.    7.   I consent to the presence of a  or observers in the operating room as deemed necessary by my physician or their designees.    8.   I recognize that in the event my procedure results in extended X-Ray/fluoroscopy time, I may develop a skin reaction.    9. If I have a Do Not  Attempt Resuscitation (DNAR) order in place, that status will be suspended while in the operating room, procedural suite, and during the recovery period unless otherwise explicitly stated by me (or a person authorized to consent on my behalf). The surgeon or my attending physician will determine when the applicable recovery period ends for purposes of reinstating the DNAR order.  10. Patients having a sterilization procedure: I understand that if the procedure is successful the results will be permanent and it will therefore be impossible for me to inseminate, conceive, or bear children.  I also understand that the procedure is intended to result in sterility, although the result has not been guaranteed.   11. I acknowledge that my physician has explained sedation/analgesia administration to me including the risk and benefits I consent to the administration of sedation/analgesia as may be necessary or desirable in the judgment of my physician.    I CERTIFY THAT I HAVE READ AND FULLY UNDERSTAND THE ABOVE CONSENT TO OPERATION and/or OTHER PROCEDURE.     ____________________________________  _________________________________        ______________________________  Signature of Patient    Signature of Responsible Person                Printed Name of Responsible Person                                      ____________________________________  _____________________________                ________________________________  Signature of Witness        Date  Time         Relationship to Patient    STATEMENT OF PHYSICIAN My signature below affirms that prior to the time of the procedure; I have explained to the patient and/or his/her legal representative, the risks and benefits involved in the proposed treatment and any reasonable alternative to the proposed treatment. I have also explained the risks and benefits involved in refusal of the proposed treatment and alternatives to the proposed treatment and have answered the  patient's questions. If I have a significant financial interest in a co-management agreement or a significant financial interest in any product or implant, or other significant relationship used in this procedure/surgery, I have disclosed this and had a discussion with my patient.     _____________________________________________________              _____________________________  (Signature of Physician)                                                                                         (Date)                                   (Time)  Patient Name: William Almeida      : 3/22/1955      Printed: 4/3/2025     Medical Record #: [id number]                                      Page 1 of 1

## (undated) NOTE — LETTER
December 2, 2019     [facility]  [address] [facility] [address]      Dear Judy Silva:    Below are the results from your recent visit:    Blood test is good with normal blood counts, sugar, liver, thyroid, urine, lipids and kidney tests.      Resulte WBC 7.2 4.0 - 11.0 x10(3) uL    RBC 5.06 3.80 - 5.80 x10(6)uL    HGB 15.6 13.0 - 17.5 g/dL    HCT 48.4 39.0 - 53.0 %    MCV 95.7 80.0 - 100.0 fL    MCH 30.8 26.0 - 34.0 pg    MCHC 32.2 31.0 - 37.0 g/dL    RDW-SD 46.6 (H) 35.1 - 46.3 fL    RDW 13.2 11.0 -

## (undated) NOTE — MR AVS SNAPSHOT
[facility] SPECIAL[facility] [place] [address] [address] [address] [phone]               Thank you for choosing us for your health care visit with Jocelyn Tapia MD.  We are glad to serve you and happy to provide you with this summary of y Apply 1 Application topically 3 (three) times daily. carvedilol 25 MG Tabs   Take 1 tablet (25 mg total) by mouth 2 (two) times daily with meals.    Commonly known as:  COREG           ECOTRIN LOW STRENGTH 81 MG Tbec   Generic drug:  aspirin   Chapito Mora

## (undated) NOTE — IP AVS SNAPSHOT
[facility]            (For Outpatient Use Only) Initial Admit Date: 7/2/2020   Inpt/Obs Admit Date: Inpt: 7/2/20 / Obs: N/A   Discharge Date:    Roderick Brunson:  [de-identified]   MRN: [de-identified]   CSN: [id number]   CEID: [id number]        [facility] Group Number:  Insurance Type:    Subscriber Name:  Subscriber :    Subscriber ID:  Pt Rel to Subscriber:    Hospital Account Financial Class: Managed Care    2020

## (undated) NOTE — LETTER
[address]  Authorization for Surgical Operation and Procedure                                                                                           I hereby authorize Gume Kolb MD, my physician and his/her assistants (if applicable), which may include medical students, residents, and/or fellows, to perform the following surgical operation/ procedure and administer such anesthesia as may be determined necessary by my physician: Operation/Procedure name (s) [address] on [facility]   2. I recognize that during the surgical operation/procedure, unforeseen conditions may necessitate additional or different procedures than those listed above. I, therefore, further authorize and request that the above-named surgeon, assistants, or designees perform such procedures as are, in their judgment, necessary and desirable. 3.   My surgeon/physician has discussed prior to my surgery the potential benefits, risks and side effects of this procedure; the likelihood of achieving goals; and potential problems that might occur during recuperation. They also discussed reasonable alternatives to the procedure, including risks, benefits, and side effects related to the alternatives and risks related to not receiving this procedure. I have had all my questions answered and I acknowledge that no guarantee has been made as to the result that may be obtained. 4.   Should the need arise during my operation/procedure, which includes change of level of care prior to discharge, I also consent to the administration of blood and/or blood products. Further, I understand that despite careful testing and screening of blood or blood products by collecting agencies, I may still be subject to ill effects as a result of receiving a blood transfusion and/or blood products.   The following are some, but not all, of the potential risks that can occur: fever and allergic reactions, hemolytic reactions, transmission of diseases such as Hepatitis, AIDS and Cytomegalovirus (CMV) and fluid overload. In the event that I wish to have an autologous transfusion of my own blood, or a directed donor transfusion, I will discuss this with my physician. Check only if Refusing Blood or Blood Products  I understand refusal of blood or blood products as deemed necessary by my physician may have serious consequences to my condition to include possible death. I hereby assume responsibility for my refusal and release the hospital, its personnel, and my physicians from any responsibility for the consequences of my refusal.    o  Refuse   5. I authorize the use of any specimen, organs, tissues, body parts or foreign objects that may be removed from my body during the operation/procedure for diagnosis, research or teaching purposes and their subsequent disposal by hospital authorities. I also authorize the release of specimen test results and/or written reports to my treating physician on the hospital medical staff or other referring or consulting physicians involved in my care, at the discretion of the Pathologist or my treating physician. 6.   I consent to the photographing or videotaping of the operations or procedures to be performed, including appropriate portions of my body for medical, scientific, or educational purposes, provided my identity is not revealed by the pictures or by descriptive texts accompanying them. If the procedure has been photographed/videotaped, the surgeon will obtain the original picture, image, videotape or CD. The hospital will not be responsible for storage, release or maintenance of the picture, image, tape or CD.    7.   I consent to the presence of a  or observers in the operating room as deemed necessary by my physician or their designees.     8.   I recognize that in the event my procedure results in extended X-Ray/fluoroscopy time, I may develop a skin reaction. 9. If I have a Do Not Attempt Resuscitation (DNAR) order in place, that status will be suspended while in the operating room, procedural suite, and during the recovery period unless otherwise explicitly stated by me (or a person authorized to consent on my behalf). The surgeon or my attending physician will determine when the applicable recovery period ends for purposes of reinstating the DNAR order. 10. Patients having a sterilization procedure: I understand that if the procedure is successful the results will be permanent and it will therefore be impossible for me to inseminate, conceive, or bear children. I also understand that the procedure is intended to result in sterility, although the result has not been guaranteed. 11. I acknowledge that my physician has explained sedation/analgesia administration to me including the risk and benefits I consent to the administration of sedation/analgesia as may be necessary or desirable in the judgment of my physician. I CERTIFY THAT I HAVE READ AND FULLY UNDERSTAND THE ABOVE CONSENT TO OPERATION and/or OTHER PROCEDURE.     _________________________________________ _________________________________     ___________________________________  Signature of Patient     Signature of Responsible Person                   Printed Name of Responsible Person                              _________________________________________ ______________________________        ___________________________________  Signature of Witness         Date  Time         Relationship to Patient    STATEMENT OF PHYSICIAN My signature below affirms that prior to the time of the procedure; I have explained to the patient and/or his/her legal representative, the risks and benefits involved in the proposed treatment and any reasonable alternative to the proposed treatment.  I have also explained the risks and benefits involved in refusal of the proposed treatment and alternatives to the proposed treatment and have answered the patient's questions.  If I have a significant financial interest in a co-management agreement or a significant financial interest in any product or implant, or other significant relationship used in this procedure/surgery, I have disclosed this and had a discussion with my patient.     _______________________________________________________________ _____________________________  Shamir Montenegro of Physician)                                                                                         (Date)                                   (Time)  Patient Name: Gina Velázquez    : 3/22/1955   Printed: 2023      Medical Record #: [id number]                                              Page 1 of 1

## (undated) NOTE — LETTER
[address]  [address] [place], [place]      Authorization for Surgical Operation and Procedure     Date:___________                                                                                                         Time:_______ potential risks that can occur: fever and allergic reactions, hemolytic reactions, transmission of diseases such as Hepatitis, AIDS and Cytomegalovirus (CMV) and fluid overload.   In the event that I wish to have an autologous transfusion of my own blood, o attending physician will determine when the applicable recovery period ends for purposes of reinstating the DNAR order.   10. Patients having a sterilization procedure: I understand that if the procedure is successful the results will be permanent and it wi _______________________________________________________________ _____________________________  Levi Almaraz Physician)                                                                                         (Date)                                   (Time

## (undated) NOTE — MR AVS SNAPSHOT
[facility] [place] [address] [address] [address]  [phone]               Thank you for choosing us for your health care visit with Hoda Salinas MD.  We are glad to serve you and happy to provide you with this summary o Commonly known as:  LIPITOR           Capsaicin 0.075 % Crea   Apply 1 Application topically 3 (three) times daily. Commonly known as:  ZOSTRIX           carvedilol 25 MG Tabs   Take 1 tablet (25 mg total) by mouth 2 (two) times daily with meals.    [facility] [address] [place] [address]    It is the patient's responsibility to check with and follow their insurance company's guidelines for prior authorization for this test.  You may be held responsible for payment in full active are less likely to develop some chronic diseases than adults who are inactive.      HOW TO GET STARTED: HOW TO STAY MOTIVATED:   Start activities slowly and build up over time Do what you like   Get your heart pumping – brisk walking, biking, swimmin

## (undated) NOTE — IP AVS SNAPSHOT
Patient Demographics     Address  [address] [address] Phone  [phone] [facility])  [phone] (Mobile) *Preferred* E-mail Address  [email or web address]      Emergency Contact(s)     Name Relation Home Work [facility] Warfarin Sodium 7.5 MG Tabs  Commonly known as:  COUMADIN  Next dose due: This evening      Take as directed. If you are unsure how to take this medication, talk to your nurse or doctor. Original instructions:   Take 1 tablet (7.5 mg total) by mouth night Order Status:  Completed Lab Status:  Final result Updated:  09/01/20 1917    Specimen:  Other from [facility]      Rapid SARS-CoV-2 by PCR Not Detected         H&P - H&P Note      H&P signed by Daisy Miller MD at 9/4/2020  2:44 PM   Version 1 of 1    [facility] emergency department. Currently, the patient denies any chest pain, shortness of breath, palpitations, fevers, chills, headaches, or blurred vision. He is endorsing generalized weakness.   [facility] management had been working with the patient in the [place] [address] EXTREMITIES:  Peripheral pulses are positive. NEUROLOGIC:  No focal neurologic deficits noted at this time. PSYCHIATRIC:  Appropriate mood and behavior.      LABORATORY DATA:  Glucose 99, sodium 140, potassium 4.5, chloride 109, carbon dioxide 24, BUN 1 Dictated By Daisy Adams MD  d: 09/01/2020 19:16:11  t: 09/01/2020 19:25:54  Job [id number]  [facility]/  [SS.1]  Electronically signed by Gio Martin MD on 9/4/2020  2:44 PM   Attribution Key    SS. 1 - Gio Martin MD on 9/1/2020  7 Mobility Short Form. Research supports that patients with this level of impairment may benefit from [address]. PT recommendation: VICK. DISCHARGE RECOMMENDATIONS[CK.1]  PT Discharge Recommendations: Sub-acute rehabilitation[CK. 2]     PLAN[CK __0___12. Placing alternate foot on stool   __0___13. Standing with one foot in front   __0___14.  Standing on one foot     Total __24___/56 (less than 46/56 = fall risk)        AM-PAC '6-Clicks' INPATIENT SHORT FORM - BASIC MOBILITY  How much difficulty do Current Status Pt amb 2 x 75 ft with RW and min a;unsteady gait.    Goal #4 Patient will negotiate 4 stairs/one curb w/ assistive device and supervision   Goal #4   Current Status NT   Goal #5 Patient to demonstrate independence with home activity/exercise PT Discharge Recommendations: Sub-acute rehabilitation     PLAN  PT Treatment Plan: Bed mobility; Body mechanics; Endurance; Energy conservation;Patient education;Gait training;Neuromuscular re-educate;Strengthening;Stair training;Transfer training;Balance tr Total __24___/56 (less than 46/56 = fall risk)        AM-PAC '6-Clicks' INPATIENT SHORT FORM - BASIC MOBILITY  How much difficulty does the patient currently have. ..  -   Turning over in bed (including adjusting bedclothes, sheets and blankets)?: A Little Goal #5 Patient to demonstrate independence with home activity/exercise instructions provided to patient in preparation for discharge.    Goal #5   Current Status Ongoing   Goal #6    Goal #6  Current Status[ND.1]           Attribution Key    ND.1 - Todd all needs within reach. Chair height elevated with blankets and pillow for comfort. The patient's Approx Degree of Impairment: 53.32% has been calculated based on documentation in the [facility] '6 clicks' Inpatient Daily Activity Short Form.   Research support Chair Transfer: Minimal assist    Bedroom Mobility: CGA with walker.      BALANCE ASSESSMENT  Static Sitting: Good  Dynamic Sitting: Fair  Static Standing: Fair  Dynamic Standing: Fair-    FUNCTIONAL ADL ASSESSMENT  Grooming: Stood at the sink with CGA to p Description:  Patient's Short Term Goal: \"be able to move\"    Interventions:   - assist with adl's prn  -monitor vital signs and labs  -monitor and medicate for pain  - See additional Care Plan goals for specific interventions

## (undated) NOTE — LETTER
Yudith Reddy  3/22/1955    A patient of your clinic was recently seen by the hospitalists at [facility], and will need to follow up with you this week (8/12/2020).      The patient's last INR values were, as follows:   Lab Results   Compon

## (undated) NOTE — IP AVS SNAPSHOT
[facility]            (For Outpatient Use Only) Initial Admit Date: 9/1/2020   Inpt/Obs Admit Date: Inpt: N/A / Obs: 09/01/20   Discharge Date:    Catracho Fishman:  [de-identified]   MRN: [de-identified]   CSN: [id number]   CEID: [id number]        QHW Group Number:  Insurance Type:    Subscriber Name:  Subscriber :    Subscriber ID:  Pt Rel to Subscriber:    Hospital Account Financial Class: Managed Care    2020

## (undated) NOTE — MR AVS SNAPSHOT
[address] [address]  [phone]               Thank you for choosing us for your health care visit with Vega Farmer MD.  We are glad to serve you and happy to provide you with this s Take 1 tablet (10 mg total) by mouth daily. Commonly known as:  PRINIVIL,ZESTRIL           TraMADol HCl 50 MG Tabs   Take 1 tablet (50 mg total) by mouth every 6 (six) hours as needed for Pain.    Commonly known as:  ULTRAM           Warfarin Sodium 5 MG

## (undated) NOTE — LETTER
11/22/2024          William Almeida    [address] [place] [address] [address] [place] [address]         Dear William,    Our records indicate that the tests ordered for you by ZAIRA Singh  have not been done.  If you have, in fact, already completed the tests or you do not wish to have the tests done, please contact our office at THE NUMBER LISTED BELOW.  Otherwise, please proceed with the testing.  Enclosed is a duplicate order for your convenience.    Labs Order:  Pancreatic Elastase, Fecal (Order #[id number]) on 10/21/24         Sincerely,    ZAIRA Singh  [address]  [facility] [id number] [phone]